# Patient Record
Sex: MALE | Race: WHITE | NOT HISPANIC OR LATINO
[De-identification: names, ages, dates, MRNs, and addresses within clinical notes are randomized per-mention and may not be internally consistent; named-entity substitution may affect disease eponyms.]

---

## 2017-01-30 ENCOUNTER — APPOINTMENT (OUTPATIENT)
Dept: ELECTROPHYSIOLOGY | Facility: CLINIC | Age: 77
End: 2017-01-30

## 2017-03-03 ENCOUNTER — APPOINTMENT (OUTPATIENT)
Dept: ELECTROPHYSIOLOGY | Facility: CLINIC | Age: 77
End: 2017-03-03

## 2017-04-03 ENCOUNTER — APPOINTMENT (OUTPATIENT)
Dept: ELECTROPHYSIOLOGY | Facility: CLINIC | Age: 77
End: 2017-04-03

## 2017-05-05 ENCOUNTER — APPOINTMENT (OUTPATIENT)
Dept: ELECTROPHYSIOLOGY | Facility: CLINIC | Age: 77
End: 2017-05-05

## 2017-06-05 ENCOUNTER — APPOINTMENT (OUTPATIENT)
Dept: ELECTROPHYSIOLOGY | Facility: CLINIC | Age: 77
End: 2017-06-05

## 2017-07-27 ENCOUNTER — OUTPATIENT (OUTPATIENT)
Dept: OUTPATIENT SERVICES | Facility: HOSPITAL | Age: 77
LOS: 1 days | End: 2017-07-27
Payer: MEDICARE

## 2017-07-27 ENCOUNTER — APPOINTMENT (OUTPATIENT)
Dept: MRI IMAGING | Facility: CLINIC | Age: 77
End: 2017-07-27
Payer: MEDICARE

## 2017-07-27 DIAGNOSIS — Z98.89 OTHER SPECIFIED POSTPROCEDURAL STATES: Chronic | ICD-10-CM

## 2017-07-27 DIAGNOSIS — D49.7 NEOPLASM OF UNSPECIFIED BEHAVIOR OF ENDOCRINE GLANDS AND OTHER PARTS OF NERVOUS SYSTEM: ICD-10-CM

## 2017-07-27 DIAGNOSIS — M47.14 OTHER SPONDYLOSIS WITH MYELOPATHY, THORACIC REGION: ICD-10-CM

## 2017-07-27 DIAGNOSIS — M48.07 SPINAL STENOSIS, LUMBOSACRAL REGION: Chronic | ICD-10-CM

## 2017-07-27 PROCEDURE — 72157 MRI CHEST SPINE W/O & W/DYE: CPT

## 2017-07-27 PROCEDURE — 72157 MRI CHEST SPINE W/O & W/DYE: CPT | Mod: 26

## 2017-07-27 PROCEDURE — 82565 ASSAY OF CREATININE: CPT

## 2017-07-27 PROCEDURE — A9585: CPT

## 2018-03-24 ENCOUNTER — APPOINTMENT (OUTPATIENT)
Dept: MRI IMAGING | Facility: CLINIC | Age: 78
End: 2018-03-24
Payer: MEDICARE

## 2018-03-24 ENCOUNTER — OUTPATIENT (OUTPATIENT)
Dept: OUTPATIENT SERVICES | Facility: HOSPITAL | Age: 78
LOS: 1 days | End: 2018-03-24
Payer: MEDICARE

## 2018-03-24 DIAGNOSIS — M48.07 SPINAL STENOSIS, LUMBOSACRAL REGION: Chronic | ICD-10-CM

## 2018-03-24 DIAGNOSIS — Z98.89 OTHER SPECIFIED POSTPROCEDURAL STATES: Chronic | ICD-10-CM

## 2018-03-24 DIAGNOSIS — M51.24 OTHER INTERVERTEBRAL DISC DISPLACEMENT, THORACIC REGION: ICD-10-CM

## 2018-03-24 PROCEDURE — 72146 MRI CHEST SPINE W/O DYE: CPT | Mod: 26

## 2018-03-24 PROCEDURE — 72146 MRI CHEST SPINE W/O DYE: CPT

## 2020-01-14 ENCOUNTER — INPATIENT (INPATIENT)
Facility: HOSPITAL | Age: 80
LOS: 1 days | Discharge: HOME | DRG: 378 | End: 2020-01-16
Attending: HOSPITALIST
Payer: MEDICARE

## 2020-01-14 VITALS
HEART RATE: 70 BPM | HEIGHT: 72 IN | RESPIRATION RATE: 20 BRPM | TEMPERATURE: 98 F | DIASTOLIC BLOOD PRESSURE: 52 MMHG | WEIGHT: 250 LBS | SYSTOLIC BLOOD PRESSURE: 119 MMHG | OXYGEN SATURATION: 95 %

## 2020-01-14 DIAGNOSIS — M48.07 SPINAL STENOSIS, LUMBOSACRAL REGION: Chronic | ICD-10-CM

## 2020-01-14 DIAGNOSIS — Z98.89 OTHER SPECIFIED POSTPROCEDURAL STATES: Chronic | ICD-10-CM

## 2020-01-14 DIAGNOSIS — E78.00 PURE HYPERCHOLESTEROLEMIA, UNSPECIFIED: ICD-10-CM

## 2020-01-14 DIAGNOSIS — I10 ESSENTIAL (PRIMARY) HYPERTENSION: ICD-10-CM

## 2020-01-14 DIAGNOSIS — D50.0 IRON DEFICIENCY ANEMIA SECONDARY TO BLOOD LOSS (CHRONIC): ICD-10-CM

## 2020-01-14 DIAGNOSIS — N17.9 ACUTE KIDNEY FAILURE, UNSPECIFIED: ICD-10-CM

## 2020-01-14 DIAGNOSIS — G45.9 TRANSIENT CEREBRAL ISCHEMIC ATTACK, UNSPECIFIED: ICD-10-CM

## 2020-01-14 DIAGNOSIS — E11.9 TYPE 2 DIABETES MELLITUS WITHOUT COMPLICATIONS: ICD-10-CM

## 2020-01-14 DIAGNOSIS — K92.2 GASTROINTESTINAL HEMORRHAGE, UNSPECIFIED: ICD-10-CM

## 2020-01-14 LAB
ALBUMIN SERPL ELPH-MCNC: 3.9 G/DL — SIGNIFICANT CHANGE UP (ref 3.3–5.2)
ALP SERPL-CCNC: 41 U/L — SIGNIFICANT CHANGE UP (ref 40–120)
ALT FLD-CCNC: 8 U/L — SIGNIFICANT CHANGE UP
ANION GAP SERPL CALC-SCNC: 12 MMOL/L — SIGNIFICANT CHANGE UP (ref 5–17)
ANISOCYTOSIS BLD QL: SLIGHT — SIGNIFICANT CHANGE UP
APPEARANCE UR: CLEAR — SIGNIFICANT CHANGE UP
APTT BLD: 25.2 SEC — LOW (ref 27.5–36.3)
AST SERPL-CCNC: 12 U/L — SIGNIFICANT CHANGE UP
BACTERIA # UR AUTO: ABNORMAL
BASOPHILS # BLD AUTO: 0.06 K/UL — SIGNIFICANT CHANGE UP (ref 0–0.2)
BASOPHILS NFR BLD AUTO: 0.9 % — SIGNIFICANT CHANGE UP (ref 0–2)
BILIRUB SERPL-MCNC: 0.3 MG/DL — LOW (ref 0.4–2)
BILIRUB UR-MCNC: NEGATIVE — SIGNIFICANT CHANGE UP
BLD GP AB SCN SERPL QL: SIGNIFICANT CHANGE UP
BUN SERPL-MCNC: 42 MG/DL — HIGH (ref 8–20)
CALCIUM SERPL-MCNC: 9 MG/DL — SIGNIFICANT CHANGE UP (ref 8.6–10.2)
CHLORIDE SERPL-SCNC: 102 MMOL/L — SIGNIFICANT CHANGE UP (ref 98–107)
CO2 SERPL-SCNC: 22 MMOL/L — SIGNIFICANT CHANGE UP (ref 22–29)
COLOR SPEC: YELLOW — SIGNIFICANT CHANGE UP
COMMENT - URINE: SIGNIFICANT CHANGE UP
CREAT SERPL-MCNC: 1.33 MG/DL — HIGH (ref 0.5–1.3)
DIFF PNL FLD: ABNORMAL
EOSINOPHIL # BLD AUTO: 0 K/UL — SIGNIFICANT CHANGE UP (ref 0–0.5)
EOSINOPHIL NFR BLD AUTO: 0 % — SIGNIFICANT CHANGE UP (ref 0–6)
EPI CELLS # UR: SIGNIFICANT CHANGE UP
GIANT PLATELETS BLD QL SMEAR: PRESENT — SIGNIFICANT CHANGE UP
GLUCOSE BLDC GLUCOMTR-MCNC: 137 MG/DL — HIGH (ref 70–99)
GLUCOSE SERPL-MCNC: 168 MG/DL — HIGH (ref 70–115)
GLUCOSE UR QL: NEGATIVE MG/DL — SIGNIFICANT CHANGE UP
HCT VFR BLD CALC: 21.1 % — LOW (ref 39–50)
HGB BLD-MCNC: 7.2 G/DL — LOW (ref 13–17)
HYALINE CASTS # UR AUTO: ABNORMAL /LPF
HYPOCHROMIA BLD QL: SLIGHT — SIGNIFICANT CHANGE UP
INR BLD: 1.02 RATIO — SIGNIFICANT CHANGE UP (ref 0.88–1.16)
KETONES UR-MCNC: NEGATIVE — SIGNIFICANT CHANGE UP
LEUKOCYTE ESTERASE UR-ACNC: ABNORMAL
LYMPHOCYTES # BLD AUTO: 1.31 K/UL — SIGNIFICANT CHANGE UP (ref 1–3.3)
LYMPHOCYTES # BLD AUTO: 18.6 % — SIGNIFICANT CHANGE UP (ref 13–44)
MACROCYTES BLD QL: SLIGHT — SIGNIFICANT CHANGE UP
MANUAL SMEAR VERIFICATION: SIGNIFICANT CHANGE UP
MCHC RBC-ENTMCNC: 31.7 PG — SIGNIFICANT CHANGE UP (ref 27–34)
MCHC RBC-ENTMCNC: 34.1 GM/DL — SIGNIFICANT CHANGE UP (ref 32–36)
MCV RBC AUTO: 93 FL — SIGNIFICANT CHANGE UP (ref 80–100)
MICROCYTES BLD QL: SLIGHT — SIGNIFICANT CHANGE UP
MONOCYTES # BLD AUTO: 0.12 K/UL — SIGNIFICANT CHANGE UP (ref 0–0.9)
MONOCYTES NFR BLD AUTO: 1.7 % — LOW (ref 2–14)
NEUTROPHILS # BLD AUTO: 5.41 K/UL — SIGNIFICANT CHANGE UP (ref 1.8–7.4)
NEUTROPHILS NFR BLD AUTO: 77 % — SIGNIFICANT CHANGE UP (ref 43–77)
NITRITE UR-MCNC: NEGATIVE — SIGNIFICANT CHANGE UP
OB PNL STL: POSITIVE
OVALOCYTES BLD QL SMEAR: SLIGHT — SIGNIFICANT CHANGE UP
PH UR: 5 — SIGNIFICANT CHANGE UP (ref 5–8)
PLAT MORPH BLD: ABNORMAL
PLATELET # BLD AUTO: 191 K/UL — SIGNIFICANT CHANGE UP (ref 150–400)
PLATELET COUNT - ESTIMATE: NORMAL — SIGNIFICANT CHANGE UP
POIKILOCYTOSIS BLD QL AUTO: SLIGHT — SIGNIFICANT CHANGE UP
POLYCHROMASIA BLD QL SMEAR: SLIGHT — SIGNIFICANT CHANGE UP
POTASSIUM SERPL-MCNC: 4.3 MMOL/L — SIGNIFICANT CHANGE UP (ref 3.5–5.3)
POTASSIUM SERPL-SCNC: 4.3 MMOL/L — SIGNIFICANT CHANGE UP (ref 3.5–5.3)
PROT SERPL-MCNC: 5.9 G/DL — LOW (ref 6.6–8.7)
PROT UR-MCNC: 100 MG/DL
PROTHROM AB SERPL-ACNC: 11.7 SEC — SIGNIFICANT CHANGE UP (ref 10–12.9)
RBC # BLD: 2.27 M/UL — LOW (ref 4.2–5.8)
RBC # FLD: 13.2 % — SIGNIFICANT CHANGE UP (ref 10.3–14.5)
RBC BLD AUTO: ABNORMAL
RBC CASTS # UR COMP ASSIST: ABNORMAL /HPF (ref 0–4)
SODIUM SERPL-SCNC: 136 MMOL/L — SIGNIFICANT CHANGE UP (ref 135–145)
SP GR SPEC: 1.02 — SIGNIFICANT CHANGE UP (ref 1.01–1.02)
TROPONIN T SERPL-MCNC: <0.01 NG/ML — SIGNIFICANT CHANGE UP (ref 0–0.06)
UROBILINOGEN FLD QL: NEGATIVE MG/DL — SIGNIFICANT CHANGE UP
VARIANT LYMPHS # BLD: 1.8 % — SIGNIFICANT CHANGE UP (ref 0–6)
WBC # BLD: 7.03 K/UL — SIGNIFICANT CHANGE UP (ref 3.8–10.5)
WBC # FLD AUTO: 7.03 K/UL — SIGNIFICANT CHANGE UP (ref 3.8–10.5)
WBC UR QL: ABNORMAL

## 2020-01-14 PROCEDURE — 99223 1ST HOSP IP/OBS HIGH 75: CPT

## 2020-01-14 PROCEDURE — 70450 CT HEAD/BRAIN W/O DYE: CPT | Mod: 26

## 2020-01-14 PROCEDURE — 99222 1ST HOSP IP/OBS MODERATE 55: CPT

## 2020-01-14 PROCEDURE — 93010 ELECTROCARDIOGRAM REPORT: CPT

## 2020-01-14 PROCEDURE — 99285 EMERGENCY DEPT VISIT HI MDM: CPT

## 2020-01-14 RX ORDER — CARVEDILOL PHOSPHATE 80 MG/1
3.12 CAPSULE, EXTENDED RELEASE ORAL EVERY 12 HOURS
Refills: 0 | Status: DISCONTINUED | OUTPATIENT
Start: 2020-01-14 | End: 2020-01-16

## 2020-01-14 RX ORDER — AMLODIPINE BESYLATE 2.5 MG/1
5 TABLET ORAL DAILY
Refills: 0 | Status: DISCONTINUED | OUTPATIENT
Start: 2020-01-14 | End: 2020-01-16

## 2020-01-14 RX ORDER — GLUCAGON INJECTION, SOLUTION 0.5 MG/.1ML
1 INJECTION, SOLUTION SUBCUTANEOUS ONCE
Refills: 0 | Status: DISCONTINUED | OUTPATIENT
Start: 2020-01-14 | End: 2020-01-16

## 2020-01-14 RX ORDER — BACLOFEN 100 %
10 POWDER (GRAM) MISCELLANEOUS THREE TIMES A DAY
Refills: 0 | Status: DISCONTINUED | OUTPATIENT
Start: 2020-01-14 | End: 2020-01-16

## 2020-01-14 RX ORDER — METOCLOPRAMIDE HCL 10 MG
10 TABLET ORAL ONCE
Refills: 0 | Status: COMPLETED | OUTPATIENT
Start: 2020-01-14 | End: 2020-01-14

## 2020-01-14 RX ORDER — PANTOPRAZOLE SODIUM 20 MG/1
80 TABLET, DELAYED RELEASE ORAL ONCE
Refills: 0 | Status: COMPLETED | OUTPATIENT
Start: 2020-01-14 | End: 2020-01-14

## 2020-01-14 RX ORDER — INSULIN LISPRO 100/ML
VIAL (ML) SUBCUTANEOUS
Refills: 0 | Status: DISCONTINUED | OUTPATIENT
Start: 2020-01-14 | End: 2020-01-16

## 2020-01-14 RX ORDER — TAMSULOSIN HYDROCHLORIDE 0.4 MG/1
0.8 CAPSULE ORAL AT BEDTIME
Refills: 0 | Status: DISCONTINUED | OUTPATIENT
Start: 2020-01-14 | End: 2020-01-16

## 2020-01-14 RX ORDER — DEXTROSE 50 % IN WATER 50 %
12.5 SYRINGE (ML) INTRAVENOUS ONCE
Refills: 0 | Status: DISCONTINUED | OUTPATIENT
Start: 2020-01-14 | End: 2020-01-16

## 2020-01-14 RX ORDER — ONDANSETRON 8 MG/1
4 TABLET, FILM COATED ORAL EVERY 6 HOURS
Refills: 0 | Status: DISCONTINUED | OUTPATIENT
Start: 2020-01-14 | End: 2020-01-16

## 2020-01-14 RX ORDER — MECLIZINE HCL 12.5 MG
12.5 TABLET ORAL THREE TIMES A DAY
Refills: 0 | Status: DISCONTINUED | OUTPATIENT
Start: 2020-01-14 | End: 2020-01-16

## 2020-01-14 RX ORDER — DEXTROSE 50 % IN WATER 50 %
25 SYRINGE (ML) INTRAVENOUS ONCE
Refills: 0 | Status: DISCONTINUED | OUTPATIENT
Start: 2020-01-14 | End: 2020-01-16

## 2020-01-14 RX ORDER — SODIUM CHLORIDE 9 MG/ML
1000 INJECTION INTRAMUSCULAR; INTRAVENOUS; SUBCUTANEOUS ONCE
Refills: 0 | Status: COMPLETED | OUTPATIENT
Start: 2020-01-14 | End: 2020-01-14

## 2020-01-14 RX ORDER — MECLIZINE HCL 12.5 MG
25 TABLET ORAL ONCE
Refills: 0 | Status: COMPLETED | OUTPATIENT
Start: 2020-01-14 | End: 2020-01-14

## 2020-01-14 RX ORDER — SODIUM CHLORIDE 9 MG/ML
1000 INJECTION, SOLUTION INTRAVENOUS
Refills: 0 | Status: DISCONTINUED | OUTPATIENT
Start: 2020-01-14 | End: 2020-01-16

## 2020-01-14 RX ORDER — SODIUM CHLORIDE 9 MG/ML
1000 INJECTION INTRAMUSCULAR; INTRAVENOUS; SUBCUTANEOUS
Refills: 0 | Status: DISCONTINUED | OUTPATIENT
Start: 2020-01-14 | End: 2020-01-16

## 2020-01-14 RX ORDER — ACETAMINOPHEN 500 MG
650 TABLET ORAL EVERY 6 HOURS
Refills: 0 | Status: DISCONTINUED | OUTPATIENT
Start: 2020-01-14 | End: 2020-01-16

## 2020-01-14 RX ORDER — DEXTROSE 50 % IN WATER 50 %
15 SYRINGE (ML) INTRAVENOUS ONCE
Refills: 0 | Status: DISCONTINUED | OUTPATIENT
Start: 2020-01-14 | End: 2020-01-16

## 2020-01-14 RX ORDER — FINASTERIDE 5 MG/1
5 TABLET, FILM COATED ORAL DAILY
Refills: 0 | Status: DISCONTINUED | OUTPATIENT
Start: 2020-01-14 | End: 2020-01-16

## 2020-01-14 RX ADMIN — FINASTERIDE 5 MILLIGRAM(S): 5 TABLET, FILM COATED ORAL at 23:07

## 2020-01-14 RX ADMIN — CARVEDILOL PHOSPHATE 3.12 MILLIGRAM(S): 80 CAPSULE, EXTENDED RELEASE ORAL at 23:07

## 2020-01-14 RX ADMIN — Medication 10 MILLIGRAM(S): at 11:37

## 2020-01-14 RX ADMIN — Medication 25 MILLIGRAM(S): at 11:36

## 2020-01-14 RX ADMIN — PANTOPRAZOLE SODIUM 80 MILLIGRAM(S): 20 TABLET, DELAYED RELEASE ORAL at 13:52

## 2020-01-14 RX ADMIN — TAMSULOSIN HYDROCHLORIDE 0.8 MILLIGRAM(S): 0.4 CAPSULE ORAL at 23:07

## 2020-01-14 RX ADMIN — AMLODIPINE BESYLATE 5 MILLIGRAM(S): 2.5 TABLET ORAL at 23:07

## 2020-01-14 RX ADMIN — SODIUM CHLORIDE 1000 MILLILITER(S): 9 INJECTION INTRAMUSCULAR; INTRAVENOUS; SUBCUTANEOUS at 11:36

## 2020-01-14 NOTE — ED ADULT NURSE NOTE - PMH
Diabetes    Guillain Barré syndrome  diagnosed oct 2015 treated at [facility]; under care of neurologist.  High cholesterol    Hypertension

## 2020-01-14 NOTE — H&P ADULT - PROBLEM SELECTOR PLAN 6
Per wife recent blood work was normal. ALEIDA likely secondary to poor oral intake, NSAID use, ? ATN. Hold nephrotoxic agents, gentle IVF.

## 2020-01-14 NOTE — ED PROVIDER NOTE - PMH
Diabetes    Guillain Barré syndrome  diagnosed oct 2015 treated at [facility]; under care of neurologist.  High cholesterol    Hypertension

## 2020-01-14 NOTE — ED PROVIDER NOTE - PHYSICAL EXAMINATION
Const: Awake, alert and oriented. In no acute distress. Well appearing.  HEENT: NC/AT. Moist mucous membranes.  Eyes: No scleral icterus. EOMI.  Neck:. Soft and supple. Full ROM without pain.  Cardiac: Regular rate and regular rhythm. +S1/S2. No murmurs. Peripheral pulses 2+ and symmetric. No LE edema.  Resp: Speaking in full sentences. No evidence of respiratory distress. No wheezes, rales or rhonchi.  Abd: Soft, non-tender, non-distended. Normal bowel sounds in all 4 quadrants. No guarding or rebound.  Rectal:  Normal external rectum without lesions or hemorrhoids. Normal tone. Normal brown stool in rectal vault without blood. No internal hemorrhoids appreciated. Normal rectal tone.   Back: Spine midline and non-tender. No CVAT.  Skin: No rashes, abrasions or lacerations.  Neuro: Awake, alert & oriented x 3. Moves all extremities symmetrically. No ataxia. Normal coordination.

## 2020-01-14 NOTE — ED PROVIDER NOTE - CARE PLAN
Principal Discharge DX:	Iron deficiency anemia due to chronic blood loss  Secondary Diagnosis:	Gastrointestinal hemorrhage, unspecified gastrointestinal hemorrhage type

## 2020-01-14 NOTE — ED STATDOCS - PSH
H/O bilateral inguinal hernia repair    History of cholecystectomy    Lumbosacral spinal stenosis  h.o cyst resection, decompression 12/2015

## 2020-01-14 NOTE — ED ADULT TRIAGE NOTE - CHIEF COMPLAINT QUOTE
Patient arrived to ED today with c/o dizziness, weakness, diarrhea.  Patient states he has a history of guillain barre.

## 2020-01-14 NOTE — H&P ADULT - ASSESSMENT
72 yr old male with hypertension, hyperlipidemia, diabetes mellitus, h/o GBS in 2015, TIA presents with complaints of dizziness, nausea, vomiting and diarrhea started 4 days ago, and black stools for 2 days. Recently completed a course of antibiotics for a UTI. CT head done on admission negative for a stroke. Labs reveal Hb 7.2 and creatinine 1.33

## 2020-01-14 NOTE — H&P ADULT - NEUROLOGICAL DETAILS
alert and oriented x 3/normal strength/sensation intact/responds to pain/responds to verbal commands

## 2020-01-14 NOTE — ED PROVIDER NOTE - CLINICAL SUMMARY MEDICAL DECISION MAKING FREE TEXT BOX
78 y/o M with PMH GBM, DM, HTN, HLD presents complaining of a week of worsening dysequilibrium and lightheadedness upon standing that started last week as well as two episodes of vomiting and multiple episodes of dark diarrhea. Denies fevers or abdominal pain. Not on blood thinners. Rectal exam reveals normal colored stool without blood. Will evaluate with CT head and consider MRI for source of dizziness, although symptoms resolve upon lying down which is more consistent with peripheral symptoms. If patient is anemic will transfuse and consult GI. 80 y/o M with PMH GBM, DM, HTN, HLD presents complaining of a week of worsening dysequilibrium and lightheadedness upon standing that started last week as well as two episodes of vomiting and multiple episodes of dark diarrhea. Denies fevers or abdominal pain. Not on blood thinners. Rectal exam reveals normal colored stool without blood. Will evaluate with CT head and consider MRI for source of dizziness, although symptoms resolve upon lying down which is more consistent with peripheral symptoms. If patient is anemic will transfuse and consult GI.  Hgb 7.2 with positive guaiac. Protonix bolus and GI consulted.

## 2020-01-14 NOTE — CONSULT NOTE ADULT - SUBJECTIVE AND OBJECTIVE BOX
HPI: 79 year old man with PMH of DM, GBS, HTN, HTN, TIA on aspirin, loop recorder presented with dizziness and weakness for last 4 days. She had episodes of nausea, vomiting and black colored stools. He attributed it to onion soup. However, he continued to become weakness and difficulty ambulating. Went to PCP and then referred to ED. Noted to be anemic. Last BM last night. No recent EGD. Colonoscopy few years ago. No abdominal pain.       PAST MEDICAL & SURGICAL HISTORY:  High cholesterol  Diabetes  Guillain Barré syndrome: diagnosed oct 2015 treated at [facility]; under care of neurologist.  Hypertension  TIA  Lumbosacral spinal stenosis: h.o cyst resection, decompression 12/2015  History of cholecystectomy  H/O bilateral inguinal hernia repair      ROS:  No Heartburn, regurgitation, dysphagia, odynophagia.  No dyspepsia  No abdominal pain.    + Nausea, vomiting.  + Bleeding.  No hematemesis.   No diarrhea.    No hematochezia  No weight loss, anorexia.  No edema.      MEDICATIONS  (STANDING):    MEDICATIONS  (PRN):      Allergies    No Known Allergies    Intolerances        SOCIAL HISTORY:Denies smoking or drug use. Alcohol use  occasionally    ENDOSCOPIC/GI HISTORY: No recent EGD or colonoscopy.    FAMILY HISTORY:  Family history of cancer      Vital Signs Last 24 Hrs  T(C): 36.8 (14 Jan 2020 10:06), Max: 36.8 (14 Jan 2020 10:06)  T(F): 98.3 (14 Jan 2020 10:06), Max: 98.3 (14 Jan 2020 10:06)  HR: 70 (14 Jan 2020 10:06) (70 - 70)  BP: 119/52 (14 Jan 2020 10:06) (119/52 - 119/52)  BP(mean): --  RR: 20 (14 Jan 2020 10:06) (20 - 20)  SpO2: 95% (14 Jan 2020 10:06) (95% - 95%)    PHYSICAL EXAM:    GENERAL: NAD, well-groomed, well-developed  HEAD:  Atraumatic, Normocephalic  EYES: EOMI, PERRLA, conjunctiva and sclera clear  ENMT: No tonsillar erythema, exudates, or enlargement; Moist mucous membranes, Good dentition, No lesions  NECK: Supple, No JVD, Normal thyroid  CHEST/LUNG: Clear to percussion bilaterally; No rales, rhonchi, wheezing, or rubs  HEART: Regular rate and rhythm; No murmurs, rubs, or gallops  ABDOMEN: Soft, Nontender, Nondistended; Bowel sounds present  EXTREMITIES:  2+ Peripheral Pulses, No clubbing, cyanosis, or edema  LYMPH: No lymphadenopathy noted  SKIN: No rashes or lesions      LABS:                        7.2    7.03  )-----------( 191      ( 14 Jan 2020 12:01 )             21.1     01-14    136  |  102  |  42.0<H>  ----------------------------<  168<H>  4.3   |  22.0  |  1.33<H>    Ca    9.0      14 Jan 2020 12:01    TPro  5.9<L>  /  Alb  3.9  /  TBili  0.3<L>  /  DBili  x   /  AST  12  /  ALT  8   /  AlkPhos  41  01-14    PT/INR - ( 14 Jan 2020 12:01 )   PT: 11.7 sec;   INR: 1.02 ratio         PTT - ( 14 Jan 2020 12:01 )  PTT:25.2 sec       LIVER FUNCTIONS - ( 14 Jan 2020 12:01 )  Alb: 3.9 g/dL / Pro: 5.9 g/dL / ALK PHOS: 41 U/L / ALT: 8 U/L / AST: 12 U/L / GGT: x               RADIOLOGY & ADDITIONAL STUDIES: HPI: 79 year old man with PMH of DM, GBS, HTN, HTN, TIA on aspirin, loop recorder presented with dizziness and weakness for last 4 days. She had episodes of nausea, vomiting and black colored stools. He attributed it to onion soup. However, he continued to become weakness and difficulty ambulating. Went to PCP and then referred to ED. Noted to be anemic. Last BM last night. No recent EGD. Colonoscopy few years ago. No abdominal pain.       PAST MEDICAL & SURGICAL HISTORY:  High cholesterol  Diabetes  Guillain Barré syndrome: diagnosed oct 2015 treated at [facility]; under care of neurologist.  Hypertension  TIA  Lumbosacral spinal stenosis: h.o cyst resection, decompression 12/2015  History of cholecystectomy  H/O bilateral inguinal hernia repair      ROS:  No Heartburn, regurgitation, dysphagia, odynophagia.  No dyspepsia  No abdominal pain.    + Nausea, vomiting.  + Bleeding.  No hematemesis.   No diarrhea.    No hematochezia  No weight loss, anorexia.  No edema.      MEDICATIONS  (STANDING):    MEDICATIONS  (PRN):      Allergies    No Known Allergies    Intolerances        SOCIAL HISTORY:Denies smoking or drug use. Alcohol use  occasionally    ENDOSCOPIC/GI HISTORY: No recent EGD or colonoscopy.    FAMILY HISTORY:  Family history of cancer      Vital Signs Last 24 Hrs  T(C): 36.8 (14 Jan 2020 10:06), Max: 36.8 (14 Jan 2020 10:06)  T(F): 98.3 (14 Jan 2020 10:06), Max: 98.3 (14 Jan 2020 10:06)  HR: 70 (14 Jan 2020 10:06) (70 - 70)  BP: 119/52 (14 Jan 2020 10:06) (119/52 - 119/52)  BP(mean): --  RR: 20 (14 Jan 2020 10:06) (20 - 20)  SpO2: 95% (14 Jan 2020 10:06) (95% - 95%)    PHYSICAL EXAM:    GENERAL: NAD, well-groomed, well-developed  HEAD:  Atraumatic, Normocephalic  EYES: EOMI, PERRLA, conjunctiva and sclera clear  ENMT: No tonsillar erythema, exudates, or enlargement; Moist mucous membranes, Good dentition, No lesions  NECK: Supple, No JVD, Normal thyroid  CHEST/LUNG: Clear to percussion bilaterally; No rales, rhonchi, wheezing, or rubs  HEART: Regular rate and rhythm; No murmurs, rubs, or gallops  ABDOMEN: Soft, Nontender, Nondistended; Bowel sounds present  RECTAL: Refused. But performed by ER physician -brown stools  EXTREMITIES:  2+ Peripheral Pulses, No clubbing, cyanosis, or edema  LYMPH: No lymphadenopathy noted  SKIN: No rashes or lesions      LABS:                        7.2    7.03  )-----------( 191      ( 14 Jan 2020 12:01 )             21.1     01-14    136  |  102  |  42.0<H>  ----------------------------<  168<H>  4.3   |  22.0  |  1.33<H>    Ca    9.0      14 Jan 2020 12:01    TPro  5.9<L>  /  Alb  3.9  /  TBili  0.3<L>  /  DBili  x   /  AST  12  /  ALT  8   /  AlkPhos  41  01-14    PT/INR - ( 14 Jan 2020 12:01 )   PT: 11.7 sec;   INR: 1.02 ratio         PTT - ( 14 Jan 2020 12:01 )  PTT:25.2 sec       LIVER FUNCTIONS - ( 14 Jan 2020 12:01 )  Alb: 3.9 g/dL / Pro: 5.9 g/dL / ALK PHOS: 41 U/L / ALT: 8 U/L / AST: 12 U/L / GGT: x               RADIOLOGY & ADDITIONAL STUDIES: HPI: 79 year old man with PMH of DM, GBS, HTN, HTN, TIA on aspirin, loop recorder presented with dizziness and weakness for last 4 days. She had episodes of nausea, vomiting and black colored stools. He attributed it to onion soup. However, he continued to become weakness and difficulty ambulating. Went to PCP and then referred to ED. Noted to be anemic. Last BM last night. No recent EGD. Colonoscopy few years ago. No abdominal pain.     Patient has been using NSAID-motrin use for headache      PAST MEDICAL & SURGICAL HISTORY:  High cholesterol  Diabetes  Guillain Barré syndrome: diagnosed oct 2015 treated at [facility]; under care of neurologist.  Hypertension  TIA  Lumbosacral spinal stenosis: h.o cyst resection, decompression 12/2015  History of cholecystectomy  H/O bilateral inguinal hernia repair      ROS:  No Heartburn, regurgitation, dysphagia, odynophagia.  No dyspepsia  No abdominal pain.    + Nausea, vomiting.  + Bleeding.  No hematemesis.   No diarrhea.    No hematochezia  No weight loss, anorexia.  No edema.      MEDICATIONS  (STANDING):    MEDICATIONS  (PRN):      Allergies    No Known Allergies    Intolerances        SOCIAL HISTORY:Denies smoking or drug use. Alcohol use  occasionally    ENDOSCOPIC/GI HISTORY: No recent EGD or colonoscopy.    FAMILY HISTORY:  Family history of cancer      Vital Signs Last 24 Hrs  T(C): 36.8 (14 Jan 2020 10:06), Max: 36.8 (14 Jan 2020 10:06)  T(F): 98.3 (14 Jan 2020 10:06), Max: 98.3 (14 Jan 2020 10:06)  HR: 70 (14 Jan 2020 10:06) (70 - 70)  BP: 119/52 (14 Jan 2020 10:06) (119/52 - 119/52)  BP(mean): --  RR: 20 (14 Jan 2020 10:06) (20 - 20)  SpO2: 95% (14 Jan 2020 10:06) (95% - 95%)    PHYSICAL EXAM:    GENERAL: NAD, well-groomed, well-developed  HEAD:  Atraumatic, Normocephalic  EYES: EOMI, PERRLA, conjunctiva and sclera clear  ENMT: No tonsillar erythema, exudates, or enlargement; Moist mucous membranes, Good dentition, No lesions  NECK: Supple, No JVD, Normal thyroid  CHEST/LUNG: Clear to percussion bilaterally; No rales, rhonchi, wheezing, or rubs  HEART: Regular rate and rhythm; No murmurs, rubs, or gallops  ABDOMEN: Soft, Nontender, Nondistended; Bowel sounds present  RECTAL: Refused. But performed by ER physician -brown stools  EXTREMITIES:  2+ Peripheral Pulses, No clubbing, cyanosis, or edema  LYMPH: No lymphadenopathy noted  SKIN: No rashes or lesions      LABS:                        7.2    7.03  )-----------( 191      ( 14 Jan 2020 12:01 )             21.1     01-14    136  |  102  |  42.0<H>  ----------------------------<  168<H>  4.3   |  22.0  |  1.33<H>    Ca    9.0      14 Jan 2020 12:01    TPro  5.9<L>  /  Alb  3.9  /  TBili  0.3<L>  /  DBili  x   /  AST  12  /  ALT  8   /  AlkPhos  41  01-14    PT/INR - ( 14 Jan 2020 12:01 )   PT: 11.7 sec;   INR: 1.02 ratio         PTT - ( 14 Jan 2020 12:01 )  PTT:25.2 sec       LIVER FUNCTIONS - ( 14 Jan 2020 12:01 )  Alb: 3.9 g/dL / Pro: 5.9 g/dL / ALK PHOS: 41 U/L / ALT: 8 U/L / AST: 12 U/L / GGT: x               RADIOLOGY & ADDITIONAL STUDIES:

## 2020-01-14 NOTE — ED STATDOCS - PMH
Diabetes    Guillain Barré syndrome  diagnosed oct 2015 treated at [facility]; under care of neurologist.  High cholesterol    Hypertension

## 2020-01-14 NOTE — H&P ADULT - HISTORY OF PRESENT ILLNESS
72 yr old male with hypertension, hyperlipidemia, diabetes mellitus, h/o GBS in 2015, TIA presents with complaints of dizziness, nausea, vomiting and diarrhea started 4 days ago. States he was diagnosed with a UTI a few days ago, and was given a 10 day course of antibiotics which he completed yesterday. States he started to feel dizzy, unable to eat anything secondary to nausea and vomiting. He noted to have black stools for 2 days and given increasing shortness of breath on exertion, he came in for evaluation today. He reports taking Ibuprofen 2-3 times a day, every other day for headaches. He reports using a cane for ambulation and walker when he is outside the house. No chest pain, palpitations. Increased urinary frequency, had burning, which resolved. Reports difficulty walking from weakness. No incontinence.

## 2020-01-14 NOTE — ED PROVIDER NOTE - NS ED ROS FT
Const: Denies fever, chills  HEENT: Denies blurry vision, sore throat  Neck: Denies neck pain/stiffness  Resp: Denies coughing, SOB  Cardiovascular: Denies CP, palpitations, LE edema  GI: + nausea, + vomiting, + diarrhea, + blood in stool. Denies abdominal pain, constipation  : Denies urinary frequency/urgency/dysuria, hematuria  MSK: Denies back pain  Neuro: + dizziness. Denies HA, numbness, weakness  Skin: Denies rashes.

## 2020-01-14 NOTE — ED STATDOCS - PROGRESS NOTE DETAILS
80y/o M with PMHx of RUBIO Morelos presents to the ED c/o room spinning dizziness which began 5 days ago. Assoc. sx of vomiting the first two days and melena with current nausea. Pt went to PCP Lino and was referred to f/u at ED due to concern for anemia.   Pt sent to main ER for complete evaul and workup.

## 2020-01-14 NOTE — H&P ADULT - ATTENDING COMMENTS
Discussed with patient plan of care. Discussed with patient plan of care.  Updated wife, obtained medication list from wife over the phone. She is to bring in the list to the hospital. Uses mail order pharmacy.

## 2020-01-14 NOTE — H&P ADULT - NSICDXPASTMEDICALHX_GEN_ALL_CORE_FT
PAST MEDICAL HISTORY:  Diabetes     Guillain Barré syndrome diagnosed oct 2015 treated at [facility]; under care of neurologist.    High cholesterol     Hypertension

## 2020-01-14 NOTE — H&P ADULT - PROBLEM SELECTOR PLAN 1
Evaluated by GI in ED, plan for EGD in am. Likely upper GI given recent NSAID use. Continue PPI. Transfuse 2 units PRBC. Monitor Hb, hemodynamically stable.

## 2020-01-14 NOTE — H&P ADULT - NSICDXPASTSURGICALHX_GEN_ALL_CORE_FT
PAST SURGICAL HISTORY:  H/O bilateral inguinal hernia repair     History of cholecystectomy     Lumbosacral spinal stenosis h.o cyst resection, decompression 12/2015

## 2020-01-14 NOTE — ED PROVIDER NOTE - OBJECTIVE STATEMENT
80 y/o M with PMH DM, GBM in 10/2015, HLD, HTN presents complaining of worsening dizziness that he describes as lightheadedness when he stands up and extreme unbalance despite walking with his walker or cane that resolves when he lays down and rests, as well as multiple episodes of black stool over the past 4 days. He states he has always had loose stools, but it is usually a brownish color. His wife states that he vomited 3 days ago and 2 days ago, but none since then. He also has had no appetite and hasn't eaten anything in the past 2 days. He denies fevers, chills, chest pain. He notes some SOB with exertion, but states he doesn't really exert himself too much due to his baseline weakness. He has not fallen and takes no blood thinners.

## 2020-01-15 ENCOUNTER — RESULT REVIEW (OUTPATIENT)
Age: 80
End: 2020-01-15

## 2020-01-15 LAB
ANION GAP SERPL CALC-SCNC: 10 MMOL/L — SIGNIFICANT CHANGE UP (ref 5–17)
BASOPHILS # BLD AUTO: 0.02 K/UL — SIGNIFICANT CHANGE UP (ref 0–0.2)
BASOPHILS NFR BLD AUTO: 0.3 % — SIGNIFICANT CHANGE UP (ref 0–2)
BUN SERPL-MCNC: 37 MG/DL — HIGH (ref 8–20)
CALCIUM SERPL-MCNC: 8.5 MG/DL — LOW (ref 8.6–10.2)
CHLORIDE SERPL-SCNC: 105 MMOL/L — SIGNIFICANT CHANGE UP (ref 98–107)
CO2 SERPL-SCNC: 23 MMOL/L — SIGNIFICANT CHANGE UP (ref 22–29)
CREAT SERPL-MCNC: 1.1 MG/DL — SIGNIFICANT CHANGE UP (ref 0.5–1.3)
CULTURE RESULTS: SIGNIFICANT CHANGE UP
EOSINOPHIL # BLD AUTO: 0.13 K/UL — SIGNIFICANT CHANGE UP (ref 0–0.5)
EOSINOPHIL NFR BLD AUTO: 2 % — SIGNIFICANT CHANGE UP (ref 0–6)
GLUCOSE BLDC GLUCOMTR-MCNC: 134 MG/DL — HIGH (ref 70–99)
GLUCOSE BLDC GLUCOMTR-MCNC: 135 MG/DL — HIGH (ref 70–99)
GLUCOSE BLDC GLUCOMTR-MCNC: 152 MG/DL — HIGH (ref 70–99)
GLUCOSE BLDC GLUCOMTR-MCNC: 154 MG/DL — HIGH (ref 70–99)
GLUCOSE SERPL-MCNC: 143 MG/DL — HIGH (ref 70–115)
HBA1C BLD-MCNC: 5.7 % — HIGH (ref 4–5.6)
HCT VFR BLD CALC: 23.3 % — LOW (ref 39–50)
HGB BLD-MCNC: 7.6 G/DL — LOW (ref 13–17)
IMM GRANULOCYTES NFR BLD AUTO: 0.2 % — SIGNIFICANT CHANGE UP (ref 0–1.5)
INR BLD: 1.05 RATIO — SIGNIFICANT CHANGE UP (ref 0.88–1.16)
LYMPHOCYTES # BLD AUTO: 1.82 K/UL — SIGNIFICANT CHANGE UP (ref 1–3.3)
LYMPHOCYTES # BLD AUTO: 27.7 % — SIGNIFICANT CHANGE UP (ref 13–44)
MAGNESIUM SERPL-MCNC: 1.6 MG/DL — SIGNIFICANT CHANGE UP (ref 1.6–2.6)
MCHC RBC-ENTMCNC: 30 PG — SIGNIFICANT CHANGE UP (ref 27–34)
MCHC RBC-ENTMCNC: 32.6 GM/DL — SIGNIFICANT CHANGE UP (ref 32–36)
MCV RBC AUTO: 92.1 FL — SIGNIFICANT CHANGE UP (ref 80–100)
MONOCYTES # BLD AUTO: 0.61 K/UL — SIGNIFICANT CHANGE UP (ref 0–0.9)
MONOCYTES NFR BLD AUTO: 9.3 % — SIGNIFICANT CHANGE UP (ref 2–14)
NEUTROPHILS # BLD AUTO: 3.99 K/UL — SIGNIFICANT CHANGE UP (ref 1.8–7.4)
NEUTROPHILS NFR BLD AUTO: 60.5 % — SIGNIFICANT CHANGE UP (ref 43–77)
PHOSPHATE SERPL-MCNC: 3.2 MG/DL — SIGNIFICANT CHANGE UP (ref 2.4–4.7)
PLATELET # BLD AUTO: 153 K/UL — SIGNIFICANT CHANGE UP (ref 150–400)
POTASSIUM SERPL-MCNC: 4.2 MMOL/L — SIGNIFICANT CHANGE UP (ref 3.5–5.3)
POTASSIUM SERPL-SCNC: 4.2 MMOL/L — SIGNIFICANT CHANGE UP (ref 3.5–5.3)
PROTHROM AB SERPL-ACNC: 12.1 SEC — SIGNIFICANT CHANGE UP (ref 10–12.9)
RBC # BLD: 2.53 M/UL — LOW (ref 4.2–5.8)
RBC # FLD: 13.9 % — SIGNIFICANT CHANGE UP (ref 10.3–14.5)
SODIUM SERPL-SCNC: 138 MMOL/L — SIGNIFICANT CHANGE UP (ref 135–145)
SPECIMEN SOURCE: SIGNIFICANT CHANGE UP
WBC # BLD: 6.58 K/UL — SIGNIFICANT CHANGE UP (ref 3.8–10.5)
WBC # FLD AUTO: 6.58 K/UL — SIGNIFICANT CHANGE UP (ref 3.8–10.5)

## 2020-01-15 PROCEDURE — 88342 IMHCHEM/IMCYTCHM 1ST ANTB: CPT | Mod: 26

## 2020-01-15 PROCEDURE — 99231 SBSQ HOSP IP/OBS SF/LOW 25: CPT

## 2020-01-15 PROCEDURE — 88305 TISSUE EXAM BY PATHOLOGIST: CPT | Mod: 26

## 2020-01-15 PROCEDURE — 43239 EGD BIOPSY SINGLE/MULTIPLE: CPT

## 2020-01-15 RX ORDER — PANTOPRAZOLE SODIUM 20 MG/1
40 TABLET, DELAYED RELEASE ORAL EVERY 12 HOURS
Refills: 0 | Status: DISCONTINUED | OUTPATIENT
Start: 2020-01-15 | End: 2020-01-16

## 2020-01-15 RX ORDER — SUCRALFATE 1 G
1 TABLET ORAL
Refills: 0 | Status: DISCONTINUED | OUTPATIENT
Start: 2020-01-15 | End: 2020-01-16

## 2020-01-15 RX ORDER — PANTOPRAZOLE SODIUM 20 MG/1
8 TABLET, DELAYED RELEASE ORAL
Qty: 80 | Refills: 0 | Status: DISCONTINUED | OUTPATIENT
Start: 2020-01-15 | End: 2020-01-15

## 2020-01-15 RX ORDER — MAGNESIUM SULFATE 500 MG/ML
2 VIAL (ML) INJECTION ONCE
Refills: 0 | Status: COMPLETED | OUTPATIENT
Start: 2020-01-15 | End: 2020-01-15

## 2020-01-15 RX ORDER — INFLUENZA VIRUS VACCINE 15; 15; 15; 15 UG/.5ML; UG/.5ML; UG/.5ML; UG/.5ML
0.5 SUSPENSION INTRAMUSCULAR ONCE
Refills: 0 | Status: DISCONTINUED | OUTPATIENT
Start: 2020-01-15 | End: 2020-01-16

## 2020-01-15 RX ADMIN — SODIUM CHLORIDE 70 MILLILITER(S): 9 INJECTION INTRAMUSCULAR; INTRAVENOUS; SUBCUTANEOUS at 02:14

## 2020-01-15 RX ADMIN — CARVEDILOL PHOSPHATE 3.12 MILLIGRAM(S): 80 CAPSULE, EXTENDED RELEASE ORAL at 18:30

## 2020-01-15 RX ADMIN — FINASTERIDE 5 MILLIGRAM(S): 5 TABLET, FILM COATED ORAL at 18:30

## 2020-01-15 RX ADMIN — CARVEDILOL PHOSPHATE 3.12 MILLIGRAM(S): 80 CAPSULE, EXTENDED RELEASE ORAL at 05:36

## 2020-01-15 RX ADMIN — TAMSULOSIN HYDROCHLORIDE 0.8 MILLIGRAM(S): 0.4 CAPSULE ORAL at 22:35

## 2020-01-15 RX ADMIN — PANTOPRAZOLE SODIUM 10 MG/HR: 20 TABLET, DELAYED RELEASE ORAL at 09:14

## 2020-01-15 RX ADMIN — Medication 50 GRAM(S): at 08:17

## 2020-01-15 RX ADMIN — Medication 1 GRAM(S): at 22:36

## 2020-01-15 RX ADMIN — AMLODIPINE BESYLATE 5 MILLIGRAM(S): 2.5 TABLET ORAL at 05:36

## 2020-01-15 RX ADMIN — Medication 1 GRAM(S): at 23:49

## 2020-01-15 RX ADMIN — PANTOPRAZOLE SODIUM 40 MILLIGRAM(S): 20 TABLET, DELAYED RELEASE ORAL at 18:30

## 2020-01-15 NOTE — CONSULT NOTE ADULT - SUBJECTIVE AND OBJECTIVE BOX
[place] [facility] - [facility], THE HEART CENTER                                   [address] [place] [address]                                                      PHONE: [phone]                                                         FAX: [phone]  [email or web address]  ---------------------------------------------------------------------------------------------------------------------------------    Reason for Consult: PREOP CV EVALUATION    HPI:  KETAN MARKHAM is an 79y Male with under PMHx s/p ILR presenting to [facility] c/o weakness fatigue and black stools found to have severe anemia planning to undergo EGD tomorrow.   No CP or SOB    PAST MEDICAL & SURGICAL HISTORY:  High cholesterol  Diabetes  Guillain Barré syndrome: diagnosed oct 2015 treated at [facility]; under care of neurologist.  Hypertension  Lumbosacral spinal stenosis: h.o cyst resection, decompression 2015  History of cholecystectomy  H/O bilateral inguinal hernia repair      No Known Allergies      MEDICATIONS  (STANDING):  amLODIPine   Tablet 5 milliGRAM(s) Oral daily  carvedilol 3.125 milliGRAM(s) Oral every 12 hours  dextrose 5%. 1000 milliLiter(s) (50 mL/Hr) IV Continuous <Continuous>  dextrose 50% Injectable 12.5 Gram(s) IV Push once  dextrose 50% Injectable 25 Gram(s) IV Push once  dextrose 50% Injectable 25 Gram(s) IV Push once  finasteride 5 milliGRAM(s) Oral daily  influenza   Vaccine 0.5 milliLiter(s) IntraMuscular once  insulin lispro (HumaLOG) corrective regimen sliding scale   SubCutaneous three times a day before meals  pantoprazole Infusion 8 mG/Hr (10 mL/Hr) IV Continuous <Continuous>  sodium chloride 0.9%. 1000 milliLiter(s) (70 mL/Hr) IV Continuous <Continuous>  tamsulosin 0.8 milliGRAM(s) Oral at bedtime    MEDICATIONS  (PRN):  acetaminophen   Tablet .. 650 milliGRAM(s) Oral every 6 hours PRN Temp greater or equal to 38C (100.4F), Mild Pain (1 - 3)  baclofen 10 milliGRAM(s) Oral three times a day PRN Muscle Spasm  dextrose 40% Gel 15 Gram(s) Oral once PRN Blood Glucose LESS THAN 70 milliGRAM(s)/deciliter  glucagon  Injectable 1 milliGRAM(s) IntraMuscular once PRN Glucose LESS THAN 70 milligrams/deciliter  meclizine 12.5 milliGRAM(s) Oral three times a day PRN Dizziness  ondansetron Injectable 4 milliGRAM(s) IV Push every 6 hours PRN Nausea and/or Vomiting      Social History:  Cigarettes:                    Alchohol:                 Illicit Drug Abuse:      REVIEW OF SYSTEMS:    Constitutional: No fever, weight loss or fatigue  Eyes: No eye pain, visual disturbances, or discharge  ENMT:  No difficulty hearing, tinnitus, vertigo; No sinus or throat pain  Neck: No pain or stiffness  Respiratory: No cough, wheezing, chills or hemoptysis  Cardiovascular: No chest pain, palpitations, shortness of breath, dizziness or leg swelling  Gastrointestinal: No abdominal or epigastric pain. No nausea, vomiting or hematemesis; No diarrhea or constipation. No melena or hematochezia.  Genitourinary: No dysuria, frequency, hematuria or incontinence  Rectal: No pain, hemorrhoids or incontinence  Neurological: No headaches, memory loss, loss of strength, numbness or tremors  Skin: No itching, burning, rashes or lesions   Lymph Nodes: No enlarged glands  Endocrine: No heat or cold intolerance; No hair loss  Musculoskeletal: No joint pain or swelling; No muscle, back or extremity pain  Psychiatric: No depression, anxiety, mood swings or difficulty sleeping  Heme/Lymph: No easy bruising or bleeding gums  Allergy and Immunologic: No hives or eczema    Vital Signs Last 24 Hrs  T(C): 36.9 (15 Peterson 2020 05:35), Max: 37.1 (2020 19:05)  T(F): 98.5 (15 Peterson 2020 05:35), Max: 98.7 (2020 19:05)  HR: 67 (15 Peterson 2020 05:35) (64 - 70)  BP: 125/55 (15 Peterson 2020 05:35) (118/60 - 156/70)  BP(mean): --  RR: 16 (15 Peterson 2020 05:35) (16 - 20)  SpO2: 99% (15 Peterson 2020 05:35) (95% - 100%)  ICU Vital Signs Last 24 Hrs  KETAN MARKHAM  I&O's Detail    15 Peterson 2020 07:01  -  15 Peterson 2020 08:40  --------------------------------------------------------  IN:  Total IN: 0 mL    OUT:    Voided: 350 mL  Total OUT: 350 mL    Total NET: -350 mL        I&O's Summary    15 Peterson 2020 07:  -  15 Peterson 2020 08:40  --------------------------------------------------------  IN: 0 mL / OUT: 350 mL / NET: -350 mL      Drug Dosing Weight  KETAN MARKHAM      PHYSICAL EXAM:  General: Appears well developed, well nourished alert and cooperative.  HEENT: Head; normocephalic, atraumatic.  Eyes: Pupils reactive, cornea wnl.  Neck: Supple, no nodes adenopathy, no NVD or carotid bruit or thyromegaly.  CARDIOVASCULAR: Normal S1 and S2, No murmur, rub, gallop or lift.   LUNGS: No rales, rhonchi or wheeze. Normal breath sounds bilaterally.  ABDOMEN: Soft, nontender without mass or organomegaly. bowel sounds normoactive.  EXTREMITIES: No clubbing, cyanosis or edema. Distal pulses wnl.   SKIN: warm and dry with normal turgor.  NEURO: Alert/oriented x 3/normal motor exam. No pathologic reflexes.    PSYCH: normal affect.        LABS:                        7.6    6.58  )-----------( 153      ( 15 Peterson 2020 06:08 )             23.3     01-15    138  |  105  |  37.0<H>  ----------------------------<  143<H>  4.2   |  23.0  |  1.10    Ca    8.5<L>      15 Peterson 2020 06:08  Phos  3.2     01-15  Mg     1.6     -15    TPro  5.9<L>  /  Alb  3.9  /  TBili  0.3<L>  /  DBili  x   /  AST  12  /  ALT  8   /  AlkPhos  41  -14    KETAN MARKHAM  CARDIAC MARKERS ( 2020 12:01 )  x     / <0.01 ng/mL / x     / x     / x          PT/INR - ( 15 Peterson 2020 06:08 )   PT: 12.1 sec;   INR: 1.05 ratio         PTT - ( 2020 12:01 )  PTT:25.2 sec  Urinalysis Basic - ( 2020 14:40 )    Color: Yellow / Appearance: Clear / S.020 / pH: x  Gluc: x / Ketone: Negative  / Bili: Negative / Urobili: Negative mg/dL   Blood: x / Protein: 100 mg/dL / Nitrite: Negative   Leuk Esterase: Moderate / RBC: 3-5 /HPF / WBC 26-50   Sq Epi: x / Non Sq Epi: Occasional / Bacteria: Few        RADIOLOGY & ADDITIONAL STUDIES:    INTERPRETATION OF TELEMETRY (personally reviewed):    ECG: < from: 12 Lead ECG (20 @ 19:23) >  Diagnosis Line Normal sinus rhythm  Right bundle branch block  T wave abnormality, consider inferolateral ischemia    < end of copied text >      ECHO:   < from: TTE Echo Complete w/Doppler (16 @ 14:15) >  IMPRESSION: Summary:   1. Left ventricular ejection fraction, by visual estimation, is 65 to   70%.   2. Normal global left ventricular systolic function.   3. Moderately increased LV wall thickness.   4. There is mild concentric left ventricular hypertrophy.   5. There is no evidence of pericardial effusion.   6. Sclerotic aortic valve with normal opening.   7. Moderately enlarged left atrium.    < end of copied text >      ACTIVE PROBLEMS:  HEALTH ISSUES - PROBLEM Dx:  TIA (transient ischemic attack): TIA (transient ischemic attack)  ALEIDA (acute kidney injury): ALEIDA (acute kidney injury)  Hypertension: Hypertension  Diabetes: Diabetes  High cholesterol: High cholesterol  Gastrointestinal hemorrhage, unspecified gastrointestinal hemorrhage type: Gastrointestinal hemorrhage, unspecified gastrointestinal hemorrhage type          Assessment and Plan:    In summary, KETAN MARKHAM is an 79y Male with past medical history significant for     1) Preoperative Optimization         -No Cardiovascular Contraindication to EGD       -Patient is acceptable risk for a low risk procedure       -Continue cardiac medications as scheduled       -Will follow                        JOAQUÍN DONOVAN MD, FACC, LYDIA [place] [facility] - [facility], [facility] C[facility]                                   [address] [place] [address]                                                      PHONE: [phone]                                                         FAX: [phone]  [email or web address]  ---------------------------------------------------------------------------------------------------------------------------------    Reason for Consult: PREOP CV EVALUATION    HPI:  KETAN MARKHAM is an 79y Male with under PMHx s/p ILR presenting to [facility] c/o weakness fatigue and black stools found to have severe anemia planning to undergo EGD today . Had been taking ASA and ibuprofen 4 tabs EOD  No CP at present    PAST MEDICAL & SURGICAL HISTORY:  High cholesterol  Diabetes  Guillain Barré syndrome: diagnosed oct 2015 treated at [facility]; under care of neurologist.  Hypertension  Lumbosacral spinal stenosis: h.o cyst resection, decompression 2015  History of cholecystectomy  H/O bilateral inguinal hernia repair      No Known Allergies      MEDICATIONS  (STANDING):  amLODIPine   Tablet 5 milliGRAM(s) Oral daily  carvedilol 3.125 milliGRAM(s) Oral every 12 hours  dextrose 5%. 1000 milliLiter(s) (50 mL/Hr) IV Continuous <Continuous>  dextrose 50% Injectable 12.5 Gram(s) IV Push once  dextrose 50% Injectable 25 Gram(s) IV Push once  dextrose 50% Injectable 25 Gram(s) IV Push once  finasteride 5 milliGRAM(s) Oral daily  influenza   Vaccine 0.5 milliLiter(s) IntraMuscular once  insulin lispro (HumaLOG) corrective regimen sliding scale   SubCutaneous three times a day before meals  pantoprazole Infusion 8 mG/Hr (10 mL/Hr) IV Continuous <Continuous>  sodium chloride 0.9%. 1000 milliLiter(s) (70 mL/Hr) IV Continuous <Continuous>  tamsulosin 0.8 milliGRAM(s) Oral at bedtime    MEDICATIONS  (PRN):  acetaminophen   Tablet .. 650 milliGRAM(s) Oral every 6 hours PRN Temp greater or equal to 38C (100.4F), Mild Pain (1 - 3)  baclofen 10 milliGRAM(s) Oral three times a day PRN Muscle Spasm  dextrose 40% Gel 15 Gram(s) Oral once PRN Blood Glucose LESS THAN 70 milliGRAM(s)/deciliter  glucagon  Injectable 1 milliGRAM(s) IntraMuscular once PRN Glucose LESS THAN 70 milligrams/deciliter  meclizine 12.5 milliGRAM(s) Oral three times a day PRN Dizziness  ondansetron Injectable 4 milliGRAM(s) IV Push every 6 hours PRN Nausea and/or Vomiting      Social History:  Cigarettes:                    Alchohol:                 Illicit Drug Abuse:      REVIEW OF SYSTEMS:    Constitutional: No fever, weight loss or fatigue  Eyes: No eye pain, visual disturbances, or discharge  ENMT:  No difficulty hearing, tinnitus, vertigo; No sinus or throat pain  Neck: No pain or stiffness  Respiratory: No cough, wheezing, chills or hemoptysis  Cardiovascular: No chest pain, palpitations, shortness of breath, dizziness or leg swelling  Gastrointestinal: No abdominal or epigastric pain. No nausea, vomiting or hematemesis; No diarrhea or constipation. No melena or hematochezia.  Genitourinary: No dysuria, frequency, hematuria or incontinence  Rectal: No pain, hemorrhoids or incontinence  Neurological: No headaches, memory loss, loss of strength, numbness or tremors  Skin: No itching, burning, rashes or lesions   Lymph Nodes: No enlarged glands  Endocrine: No heat or cold intolerance; No hair loss  Musculoskeletal: No joint pain or swelling; No muscle, back or extremity pain  Psychiatric: No depression, anxiety, mood swings or difficulty sleeping  Heme/Lymph: No easy bruising or bleeding gums  Allergy and Immunologic: No hives or eczema    Vital Signs Last 24 Hrs  T(C): 36.9 (15 Peterson 2020 05:35), Max: 37.1 (2020 19:05)  T(F): 98.5 (15 Peterson 2020 05:35), Max: 98.7 (2020 19:05)  HR: 67 (15 Peterson 2020 05:35) (64 - 70)  BP: 125/55 (15 Peterson 2020 05:35) (118/60 - 156/70)  BP(mean): --  RR: 16 (15 Peterson 2020 05:35) (16 - 20)  SpO2: 99% (15 Peterson 2020 05:35) (95% - 100%)  ICU Vital Signs Last 24 Hrs  KETAN MARKHAM  I&O's Detail    15 Peterson 2020 07:01  -  15 Peterson 2020 08:40  --------------------------------------------------------  IN:  Total IN: 0 mL    OUT:    Voided: 350 mL  Total OUT: 350 mL    Total NET: -350 mL        I&O's Summary    15 Peterson 2020 07:01  -  15 Peterson 2020 08:40  --------------------------------------------------------  IN: 0 mL / OUT: 350 mL / NET: -350 mL      Drug Dosing Weight  KETAN MARKHAM      PHYSICAL EXAM:  General: Appears well developed, well nourished alert and cooperative.  HEENT: Head; normocephalic, atraumatic.  Eyes: Pupils reactive, cornea wnl.  Neck: Supple, no nodes adenopathy, no NVD or carotid bruit or thyromegaly.  CARDIOVASCULAR: Normal S1 and S2, No murmur, rub, gallop or lift.   LUNGS: No rales, rhonchi or wheeze. Normal breath sounds bilaterally.  ABDOMEN: Soft, nontender without mass or organomegaly. bowel sounds normoactive.  EXTREMITIES: No clubbing, cyanosis or edema. Distal pulses wnl.   SKIN: warm and dry with normal turgor.  NEURO: Alert/oriented x 3/normal motor exam. No pathologic reflexes.    PSYCH: normal affect.        LABS:                        7.6    6.58  )-----------( 153      ( 15 Peterson 2020 06:08 )             23.3     01-15    138  |  105  |  37.0<H>  ----------------------------<  143<H>  4.2   |  23.0  |  1.10    Ca    8.5<L>      15 Peterson 2020 06:08  Phos  3.2     01-15  Mg     1.6     -15    TPro  5.9<L>  /  Alb  3.9  /  TBili  0.3<L>  /  DBili  x   /  AST  12  /  ALT  8   /  AlkPhos  41  14    KETAN MARKHAM  CARDIAC MARKERS ( 2020 12:01 )  x     / <0.01 ng/mL / x     / x     / x          PT/INR - ( 15 Peterson 2020 06:08 )   PT: 12.1 sec;   INR: 1.05 ratio         PTT - ( 2020 12:01 )  PTT:25.2 sec  Urinalysis Basic - ( 2020 14:40 )    Color: Yellow / Appearance: Clear / S.020 / pH: x  Gluc: x / Ketone: Negative  / Bili: Negative / Urobili: Negative mg/dL   Blood: x / Protein: 100 mg/dL / Nitrite: Negative   Leuk Esterase: Moderate / RBC: 3-5 /HPF / WBC 26-50   Sq Epi: x / Non Sq Epi: Occasional / Bacteria: Few        RADIOLOGY & ADDITIONAL STUDIES:    INTERPRETATION OF TELEMETRY (personally reviewed):    ECG: < from: 12 Lead ECG (20 @ 19:23) >  Diagnosis Line Normal sinus rhythm  Right bundle branch block  T wave abnormality, consider inferolateral ischemia    < end of copied text >      ECHO:   < from: TTE Echo Complete w/Doppler (16 @ 14:15) >  IMPRESSION: Summary:   1. Left ventricular ejection fraction, by visual estimation, is 65 to   70%.   2. Normal global left ventricular systolic function.   3. Moderately increased LV wall thickness.   4. There is mild concentric left ventricular hypertrophy.   5. There is no evidence of pericardial effusion.   6. Sclerotic aortic valve with normal opening.   7. Moderately enlarged left atrium.    < end of copied text >      ACTIVE PROBLEMS:  HEALTH ISSUES - PROBLEM Dx:  TIA (transient ischemic attack): TIA (transient ischemic attack)  ALEIDA (acute kidney injury): ALEIDA (acute kidney injury)  Hypertension: Hypertension  Diabetes: Diabetes  High cholesterol: High cholesterol  Gastrointestinal hemorrhage, unspecified gastrointestinal hemorrhage type: Gastrointestinal hemorrhage, unspecified gastrointestinal hemorrhage type          Assessment and Plan:    KETAN MARKHAM is an 79y Male with under PMHx s/p ILR presenting to [facility] c/o weakness fatigue and black stools found to have severe anemia planning to undergo EGD today . Had been taking ASA and ibuprofen 4 tabs EOD  No CP at present    1) Preoperative Optimization         -No Cardiovascular Contraindication to EGD today       -Patient is acceptable risk for a low risk procedure       -Continue cardiac medications as scheduled       -Will follow                        JOAQUÍN DONOVAN MD, FACC, LYDIA

## 2020-01-15 NOTE — BRIEF OPERATIVE NOTE - OPERATION/FINDINGS
EGD: Large clean based prepyloric gastric antral ulcer-2 cm. Another 5 mm clean based gastric antral ulcer. Normal esophagus and duodenum

## 2020-01-15 NOTE — PHYSICAL THERAPY INITIAL EVALUATION ADULT - ADDITIONAL COMMENTS
Pt lives with wife in a 2 story house with 1 step to enter, 6 step to bedroom.  Independent with all PTA, with [facility].

## 2020-01-15 NOTE — PATIENT PROFILE ADULT - NSPROPTRIGHTREPPHONE_GEN_A_NUR
Spoke with Maurice Rosales at [facility] and they are requesting the NPI# to be added on the ordered. NPI# was added to the order and refaxed to E. [phone]

## 2020-01-15 NOTE — PROGRESS NOTE ADULT - SUBJECTIVE AND OBJECTIVE BOX
KETAN MARKHAM    [id number]    79y      Male    INTERVAL HPI/OVERNIGHT EVENTS: patient being seen for GI bleed. patient is s.p 2 units prbcs    REVIEW OF SYSTEMS:    CONSTITUTIONAL: No fever, weight loss, or fatigue  RESPIRATORY: No cough, wheezing, hemoptysis; No shortness of breath  CARDIOVASCULAR: No chest pain, palpitations  GASTROINTESTINAL: No abdominal or epigastric pain. No nausea, vomiting  NEUROLOGICAL: No headaches, memory loss, loss of strength.  MISCELLANEOUS:      Vital Signs Last 24 Hrs  T(C): 37.1 (15 Peterson 2020 09:18), Max: 37.1 (2020 19:05)  T(F): 98.7 (15 Peterson 2020 09:18), Max: 98.7 (2020 19:05)  HR: 58 (15 Peterson 2020 09:18) (58 - 69)  BP: 157/68 (15 Peterson 2020 09:18) (125/55 - 157/68)  BP(mean): --  RR: 20 (15 Peterson 2020 09:18) (16 - 20)  SpO2: 100% (15 Peterson 2020 09:18) (97% - 100%)    PHYSICAL EXAM:    GENERAL: NAD, well-groomed  HEENT: PERRL, +EOMI  NECK: soft, Supple, No JVD,   CHEST/LUNG: Clear to auscultation bilaterally; No wheezing  HEART: S1S2+, Regular rate and rhythm; No murmurs, rubs, or gallops  ABDOMEN: Soft, Nontender, Nondistended; Bowel sounds present  EXTREMITIES:  2+ Peripheral Pulses, No clubbing, cyanosis, or edema  SKIN: No rashes or lesions  NEURO: AAOX3, no focal deficits, no motor r sensory loss  PSYCH: normal mood      LABS:                        7.6    6.58  )-----------( 153      ( 15 Peterson 2020 06:08 )             23.3     01-15    138  |  105  |  37.0<H>  ----------------------------<  143<H>  4.2   |  23.0  |  1.10    Ca    8.5<L>      15 Peterson 2020 06:08  Phos  3.2     01-15  Mg     1.6     01-15    TPro  5.9<L>  /  Alb  3.9  /  TBili  0.3<L>  /  DBili  x   /  AST  12  /  ALT  8   /  AlkPhos  41  01-14    PT/INR - ( 15 Peterson 2020 06:08 )   PT: 12.1 sec;   INR: 1.05 ratio         PTT - ( 2020 12:01 )  PTT:25.2 sec  Urinalysis Basic - ( 2020 14:40 )    Color: Yellow / Appearance: Clear / S.020 / pH: x  Gluc: x / Ketone: Negative  / Bili: Negative / Urobili: Negative mg/dL   Blood: x / Protein: 100 mg/dL / Nitrite: Negative   Leuk Esterase: Moderate / RBC: 3-5 /HPF / WBC 26-50   Sq Epi: x / Non Sq Epi: Occasional / Bacteria: Few          MEDICATIONS  (STANDING):  amLODIPine   Tablet 5 milliGRAM(s) Oral daily  carvedilol 3.125 milliGRAM(s) Oral every 12 hours  dextrose 5%. 1000 milliLiter(s) (50 mL/Hr) IV Continuous <Continuous>  dextrose 50% Injectable 12.5 Gram(s) IV Push once  dextrose 50% Injectable 25 Gram(s) IV Push once  dextrose 50% Injectable 25 Gram(s) IV Push once  finasteride 5 milliGRAM(s) Oral daily  influenza   Vaccine 0.5 milliLiter(s) IntraMuscular once  insulin lispro (HumaLOG) corrective regimen sliding scale   SubCutaneous three times a day before meals  pantoprazole Infusion 8 mG/Hr (10 mL/Hr) IV Continuous <Continuous>  sodium chloride 0.9%. 1000 milliLiter(s) (70 mL/Hr) IV Continuous <Continuous>  tamsulosin 0.8 milliGRAM(s) Oral at bedtime    MEDICATIONS  (PRN):  acetaminophen   Tablet .. 650 milliGRAM(s) Oral every 6 hours PRN Temp greater or equal to 38C (100.4F), Mild Pain (1 - 3)  baclofen 10 milliGRAM(s) Oral three times a day PRN Muscle Spasm  dextrose 40% Gel 15 Gram(s) Oral once PRN Blood Glucose LESS THAN 70 milliGRAM(s)/deciliter  glucagon  Injectable 1 milliGRAM(s) IntraMuscular once PRN Glucose LESS THAN 70 milligrams/deciliter  meclizine 12.5 milliGRAM(s) Oral three times a day PRN Dizziness  ondansetron Injectable 4 milliGRAM(s) IV Push every 6 hours PRN Nausea and/or Vomiting      RADIOLOGY & ADDITIONAL TESTS:

## 2020-01-15 NOTE — BRIEF OPERATIVE NOTE - NSICDXBRIEFPREOP_GEN_ALL_CORE_FT
PRE-OP DIAGNOSIS:  Acute blood loss anemia 15-Peterson-2020 16:17:44  Solo Spicer  Melena 15-Peterson-2020 16:17:30  Solo Spicer

## 2020-01-15 NOTE — BRIEF OPERATIVE NOTE - COMMENTS
PPI bid  Carafate 1 gram po qid  Clear liquid diet  Resume diet tomorrow PPI bid  Carafate 1 gram po qid  Clear liquid diet  Resume diet tomorrow  Needs EGD in 3 months to confirm gastric ulcer healing

## 2020-01-16 ENCOUNTER — TRANSCRIPTION ENCOUNTER (OUTPATIENT)
Age: 80
End: 2020-01-16

## 2020-01-16 VITALS
SYSTOLIC BLOOD PRESSURE: 157 MMHG | DIASTOLIC BLOOD PRESSURE: 63 MMHG | OXYGEN SATURATION: 97 % | HEART RATE: 67 BPM | TEMPERATURE: 98 F | RESPIRATION RATE: 17 BRPM

## 2020-01-16 LAB
ALBUMIN SERPL ELPH-MCNC: 3.7 G/DL — SIGNIFICANT CHANGE UP (ref 3.3–5.2)
ALP SERPL-CCNC: 46 U/L — SIGNIFICANT CHANGE UP (ref 40–120)
ALT FLD-CCNC: 7 U/L — SIGNIFICANT CHANGE UP
ANION GAP SERPL CALC-SCNC: 10 MMOL/L — SIGNIFICANT CHANGE UP (ref 5–17)
AST SERPL-CCNC: 13 U/L — SIGNIFICANT CHANGE UP
BILIRUB SERPL-MCNC: 0.5 MG/DL — SIGNIFICANT CHANGE UP (ref 0.4–2)
BUN SERPL-MCNC: 19 MG/DL — SIGNIFICANT CHANGE UP (ref 8–20)
CALCIUM SERPL-MCNC: 8.7 MG/DL — SIGNIFICANT CHANGE UP (ref 8.6–10.2)
CHLORIDE SERPL-SCNC: 101 MMOL/L — SIGNIFICANT CHANGE UP (ref 98–107)
CO2 SERPL-SCNC: 24 MMOL/L — SIGNIFICANT CHANGE UP (ref 22–29)
CREAT SERPL-MCNC: 0.99 MG/DL — SIGNIFICANT CHANGE UP (ref 0.5–1.3)
GLUCOSE BLDC GLUCOMTR-MCNC: 145 MG/DL — HIGH (ref 70–99)
GLUCOSE BLDC GLUCOMTR-MCNC: 155 MG/DL — HIGH (ref 70–99)
GLUCOSE BLDC GLUCOMTR-MCNC: 163 MG/DL — HIGH (ref 70–99)
GLUCOSE SERPL-MCNC: 143 MG/DL — HIGH (ref 70–115)
HCT VFR BLD CALC: 23.9 % — LOW (ref 39–50)
HGB BLD-MCNC: 7.7 G/DL — LOW (ref 13–17)
MAGNESIUM SERPL-MCNC: 1.5 MG/DL — LOW (ref 1.6–2.6)
MCHC RBC-ENTMCNC: 30.1 PG — SIGNIFICANT CHANGE UP (ref 27–34)
MCHC RBC-ENTMCNC: 32.2 GM/DL — SIGNIFICANT CHANGE UP (ref 32–36)
MCV RBC AUTO: 93.4 FL — SIGNIFICANT CHANGE UP (ref 80–100)
PLATELET # BLD AUTO: 163 K/UL — SIGNIFICANT CHANGE UP (ref 150–400)
POTASSIUM SERPL-MCNC: 4 MMOL/L — SIGNIFICANT CHANGE UP (ref 3.5–5.3)
POTASSIUM SERPL-SCNC: 4 MMOL/L — SIGNIFICANT CHANGE UP (ref 3.5–5.3)
PROT SERPL-MCNC: 5.4 G/DL — LOW (ref 6.6–8.7)
RBC # BLD: 2.56 M/UL — LOW (ref 4.2–5.8)
RBC # FLD: 14.1 % — SIGNIFICANT CHANGE UP (ref 10.3–14.5)
SODIUM SERPL-SCNC: 135 MMOL/L — SIGNIFICANT CHANGE UP (ref 135–145)
WBC # BLD: 6.24 K/UL — SIGNIFICANT CHANGE UP (ref 3.8–10.5)
WBC # FLD AUTO: 6.24 K/UL — SIGNIFICANT CHANGE UP (ref 3.8–10.5)

## 2020-01-16 PROCEDURE — 99232 SBSQ HOSP IP/OBS MODERATE 35: CPT

## 2020-01-16 PROCEDURE — 80053 COMPREHEN METABOLIC PANEL: CPT

## 2020-01-16 PROCEDURE — 88342 IMHCHEM/IMCYTCHM 1ST ANTB: CPT

## 2020-01-16 PROCEDURE — 80048 BASIC METABOLIC PNL TOTAL CA: CPT

## 2020-01-16 PROCEDURE — 85730 THROMBOPLASTIN TIME PARTIAL: CPT

## 2020-01-16 PROCEDURE — 81001 URINALYSIS AUTO W/SCOPE: CPT

## 2020-01-16 PROCEDURE — 97110 THERAPEUTIC EXERCISES: CPT

## 2020-01-16 PROCEDURE — 93005 ELECTROCARDIOGRAM TRACING: CPT

## 2020-01-16 PROCEDURE — 96375 TX/PRO/DX INJ NEW DRUG ADDON: CPT

## 2020-01-16 PROCEDURE — 36415 COLL VENOUS BLD VENIPUNCTURE: CPT

## 2020-01-16 PROCEDURE — 97163 PT EVAL HIGH COMPLEX 45 MIN: CPT

## 2020-01-16 PROCEDURE — 36430 TRANSFUSION BLD/BLD COMPNT: CPT

## 2020-01-16 PROCEDURE — 97116 GAIT TRAINING THERAPY: CPT

## 2020-01-16 PROCEDURE — 84100 ASSAY OF PHOSPHORUS: CPT

## 2020-01-16 PROCEDURE — 82962 GLUCOSE BLOOD TEST: CPT

## 2020-01-16 PROCEDURE — 87086 URINE CULTURE/COLONY COUNT: CPT

## 2020-01-16 PROCEDURE — 85027 COMPLETE CBC AUTOMATED: CPT

## 2020-01-16 PROCEDURE — 99285 EMERGENCY DEPT VISIT HI MDM: CPT | Mod: 25

## 2020-01-16 PROCEDURE — 70450 CT HEAD/BRAIN W/O DYE: CPT

## 2020-01-16 PROCEDURE — 82272 OCCULT BLD FECES 1-3 TESTS: CPT

## 2020-01-16 PROCEDURE — 83735 ASSAY OF MAGNESIUM: CPT

## 2020-01-16 PROCEDURE — 84484 ASSAY OF TROPONIN QUANT: CPT

## 2020-01-16 PROCEDURE — 86901 BLOOD TYPING SEROLOGIC RH(D): CPT

## 2020-01-16 PROCEDURE — 99239 HOSP IP/OBS DSCHRG MGMT >30: CPT

## 2020-01-16 PROCEDURE — 86923 COMPATIBILITY TEST ELECTRIC: CPT

## 2020-01-16 PROCEDURE — 83036 HEMOGLOBIN GLYCOSYLATED A1C: CPT

## 2020-01-16 PROCEDURE — 93306 TTE W/DOPPLER COMPLETE: CPT

## 2020-01-16 PROCEDURE — P9016: CPT

## 2020-01-16 PROCEDURE — 85610 PROTHROMBIN TIME: CPT

## 2020-01-16 PROCEDURE — 86850 RBC ANTIBODY SCREEN: CPT

## 2020-01-16 PROCEDURE — 86900 BLOOD TYPING SEROLOGIC ABO: CPT

## 2020-01-16 PROCEDURE — 88305 TISSUE EXAM BY PATHOLOGIST: CPT

## 2020-01-16 PROCEDURE — 96374 THER/PROPH/DIAG INJ IV PUSH: CPT

## 2020-01-16 RX ORDER — SUCRALFATE 1 G
10 TABLET ORAL
Qty: 1200 | Refills: 0
Start: 2020-01-16 | End: 2020-02-14

## 2020-01-16 RX ORDER — SUCRALFATE 1 G
10 TABLET ORAL
Qty: 360 | Refills: 0
Start: 2020-01-16 | End: 2020-04-14

## 2020-01-16 RX ORDER — ASPIRIN/CALCIUM CARB/MAGNESIUM 324 MG
1 TABLET ORAL
Qty: 0 | Refills: 3 | DISCHARGE

## 2020-01-16 RX ORDER — MAGNESIUM SULFATE 500 MG/ML
2 VIAL (ML) INJECTION ONCE
Refills: 0 | Status: COMPLETED | OUTPATIENT
Start: 2020-01-16 | End: 2020-01-16

## 2020-01-16 RX ORDER — FERROUS SULFATE 325(65) MG
1 TABLET ORAL
Qty: 30 | Refills: 0
Start: 2020-01-16 | End: 2020-02-14

## 2020-01-16 RX ORDER — PANTOPRAZOLE SODIUM 20 MG/1
1 TABLET, DELAYED RELEASE ORAL
Qty: 180 | Refills: 0
Start: 2020-01-16 | End: 2020-04-14

## 2020-01-16 RX ORDER — ASPIRIN/CALCIUM CARB/MAGNESIUM 324 MG
1 TABLET ORAL
Qty: 30 | Refills: 3
Start: 2020-01-16 | End: 2020-05-14

## 2020-01-16 RX ADMIN — Medication 1 GRAM(S): at 12:56

## 2020-01-16 RX ADMIN — Medication 1: at 09:54

## 2020-01-16 RX ADMIN — Medication 1 GRAM(S): at 05:54

## 2020-01-16 RX ADMIN — AMLODIPINE BESYLATE 5 MILLIGRAM(S): 2.5 TABLET ORAL at 05:54

## 2020-01-16 RX ADMIN — FINASTERIDE 5 MILLIGRAM(S): 5 TABLET, FILM COATED ORAL at 12:56

## 2020-01-16 RX ADMIN — PANTOPRAZOLE SODIUM 40 MILLIGRAM(S): 20 TABLET, DELAYED RELEASE ORAL at 05:54

## 2020-01-16 RX ADMIN — CARVEDILOL PHOSPHATE 3.12 MILLIGRAM(S): 80 CAPSULE, EXTENDED RELEASE ORAL at 05:54

## 2020-01-16 RX ADMIN — Medication 50 GRAM(S): at 12:56

## 2020-01-16 NOTE — DISCHARGE NOTE PROVIDER - HOSPITAL COURSE
72 yr old male with hypertension, hyperlipidemia, diabetes mellitus, h/o GBS in 2015, TIA presents with complaints of dizziness, nausea, vomiting and diarrhea. States he was diagnosed with a UTI a few days prior, and was given a 10 day course of antibiotics which he completed yesterday. States he started to feel dizzy, unable to eat anything secondary to nausea and vomiting. He noted to have black stools for 2 days and given increasing shortness of breath on exertion He reports taking Ibuprofen 2-3 times a day, every other day for headaches.         patient admitted to medicine and seen by cardio and GI in consult.         Patient started on protonix drip and given 2 units prbcs:        egd:    · Operative Findings    EGD: Large clean based prepyloric gastric antral ulcer-2 cm. Another 5 mm clean based gastric antral ulcer. Normal esophagus and duodenum        patient now cleared for dc home.         time spent on dc 32 minutes

## 2020-01-16 NOTE — DISCHARGE NOTE NURSING/CASE MANAGEMENT/SOCIAL WORK - PATIENT PORTAL LINK FT
You can access the FollowMyHealth Patient Portal offered by [facility] by registering at the following website: [email or web address]. By joining [facility]’s FollowMyHealth portal, you will also be able to view your health information using other applications (apps) compatible with our system.

## 2020-01-16 NOTE — PROGRESS NOTE ADULT - ASSESSMENT
72 yr old male with hypertension, hyperlipidemia, diabetes mellitus, h/o GBS in 2015, TIA presents with complaints of dizziness, nausea, vomiting and diarrhea started 4 days ago, and black stools for 2 days. Recently completed a course of antibiotics for a UTI. CT head done on admission negative for a stroke. Labs reveal Hb 7.2 is s.p 2 unit prbcs     Problem/Plan - 1:  ·  Problem: Gastrointestinal hemorrhage, - for egd today   --> s.p 2 units prbcs     Problem/Plan - 2:  ·  Problem: R/O Clostridium difficile diarrhea. cancel c dif
Acute anemia: Due to gastric ulcer. Continue PPI bid and carafate. Advance diet. If tolerates solid diet, can be discharged home with office FU with me in 4-6 weeks. Needs repeat EGD in 3 months.

## 2020-01-16 NOTE — PROGRESS NOTE ADULT - SUBJECTIVE AND OBJECTIVE BOX
INTERVAL HPI/OVERNIGHT EVENTS:FU for GI bleeding. s/p EGD yesterday. Large gastric antral ulcer. No active bleeding. No complaints last night.     MEDICATIONS  (STANDING):  amLODIPine   Tablet 5 milliGRAM(s) Oral daily  carvedilol 3.125 milliGRAM(s) Oral every 12 hours  dextrose 5%. 1000 milliLiter(s) (50 mL/Hr) IV Continuous <Continuous>  dextrose 50% Injectable 12.5 Gram(s) IV Push once  dextrose 50% Injectable 25 Gram(s) IV Push once  dextrose 50% Injectable 25 Gram(s) IV Push once  finasteride 5 milliGRAM(s) Oral daily  influenza   Vaccine 0.5 milliLiter(s) IntraMuscular once  insulin lispro (HumaLOG) corrective regimen sliding scale   SubCutaneous three times a day before meals  pantoprazole    Tablet 40 milliGRAM(s) Oral every 12 hours  sodium chloride 0.9%. 1000 milliLiter(s) (70 mL/Hr) IV Continuous <Continuous>  sucralfate suspension 1 Gram(s) Oral four times a day  tamsulosin 0.8 milliGRAM(s) Oral at bedtime    MEDICATIONS  (PRN):  acetaminophen   Tablet .. 650 milliGRAM(s) Oral every 6 hours PRN Temp greater or equal to 38C (100.4F), Mild Pain (1 - 3)  baclofen 10 milliGRAM(s) Oral three times a day PRN Muscle Spasm  dextrose 40% Gel 15 Gram(s) Oral once PRN Blood Glucose LESS THAN 70 milliGRAM(s)/deciliter  glucagon  Injectable 1 milliGRAM(s) IntraMuscular once PRN Glucose LESS THAN 70 milligrams/deciliter  meclizine 12.5 milliGRAM(s) Oral three times a day PRN Dizziness  ondansetron Injectable 4 milliGRAM(s) IV Push every 6 hours PRN Nausea and/or Vomiting      Allergies    No Known Allergies    Intolerances        Vital Signs Last 24 Hrs  T(C): 37.1 (2020 05:47), Max: 37.1 (15 Peterson 2020 09:18)  T(F): 98.7 (2020 05:47), Max: 98.7 (15 Peterson 2020 09:18)  HR: 66 (2020 05:47) (58 - 66)  BP: 166/67 (2020 05:47) (136/62 - 166/67)  BP(mean): 99 (15 Peterson 2020 18:10) (99 - 99)  RR: 19 (2020 05:47) (19 - 20)  SpO2: 94% (2020 05:47) (94% - 100%)    LABS:                        7.7    6.24  )-----------( 163      ( 2020 06:04 )             23.9     01-16    135  |  101  |  19.0  ----------------------------<  143<H>  4.0   |  24.0  |  0.99    Ca    8.7      2020 06:04  Phos  3.2     01-15  Mg     1.5     -16    TPro  5.4<L>  /  Alb  3.7  /  TBili  0.5  /  DBili  x   /  AST  13  /  ALT  7   /  AlkPhos  46  01-16    PT/INR - ( 15 Peterson 2020 06:08 )   PT: 12.1 sec;   INR: 1.05 ratio         PTT - ( 2020 12:01 )  PTT:25.2 sec  Urinalysis Basic - ( 2020 14:40 )    Color: Yellow / Appearance: Clear / S.020 / pH: x  Gluc: x / Ketone: Negative  / Bili: Negative / Urobili: Negative mg/dL   Blood: x / Protein: 100 mg/dL / Nitrite: Negative   Leuk Esterase: Moderate / RBC: 3-5 /HPF / WBC 26-50   Sq Epi: x / Non Sq Epi: Occasional / Bacteria: Few        RADIOLOGY & ADDITIONAL TESTS:

## 2020-01-16 NOTE — PROGRESS NOTE ADULT - SUBJECTIVE AND OBJECTIVE BOX
[facility]                                   [address] [place] [address]                                                      PHONE: [phone]                                                         FAX: [phone]  [email or web address]  ---------------------------------------------------------------------------------------------------------------------------------    Overnight events/patient complaints:  NAD post EGD     No Known Allergies    MEDICATIONS  (STANDING):  amLODIPine   Tablet 5 milliGRAM(s) Oral daily  carvedilol 3.125 milliGRAM(s) Oral every 12 hours  dextrose 5%. 1000 milliLiter(s) (50 mL/Hr) IV Continuous <Continuous>  dextrose 50% Injectable 12.5 Gram(s) IV Push once  dextrose 50% Injectable 25 Gram(s) IV Push once  dextrose 50% Injectable 25 Gram(s) IV Push once  finasteride 5 milliGRAM(s) Oral daily  influenza   Vaccine 0.5 milliLiter(s) IntraMuscular once  insulin lispro (HumaLOG) corrective regimen sliding scale   SubCutaneous three times a day before meals  magnesium sulfate  IVPB 2 Gram(s) IV Intermittent once  pantoprazole    Tablet 40 milliGRAM(s) Oral every 12 hours  sucralfate suspension 1 Gram(s) Oral four times a day  tamsulosin 0.8 milliGRAM(s) Oral at bedtime    MEDICATIONS  (PRN):  acetaminophen   Tablet .. 650 milliGRAM(s) Oral every 6 hours PRN Temp greater or equal to 38C (100.4F), Mild Pain (1 - 3)  baclofen 10 milliGRAM(s) Oral three times a day PRN Muscle Spasm  dextrose 40% Gel 15 Gram(s) Oral once PRN Blood Glucose LESS THAN 70 milliGRAM(s)/deciliter  glucagon  Injectable 1 milliGRAM(s) IntraMuscular once PRN Glucose LESS THAN 70 milligrams/deciliter  meclizine 12.5 milliGRAM(s) Oral three times a day PRN Dizziness  ondansetron Injectable 4 milliGRAM(s) IV Push every 6 hours PRN Nausea and/or Vomiting      Vital Signs Last 24 Hrs  T(C): 37.1 (2020 05:47), Max: 37.1 (15 Peterson 2020 09:18)  T(F): 98.7 (2020 05:47), Max: 98.7 (15 Peterson 2020 09:18)  HR: 66 (2020 05:47) (58 - 66)  BP: 166/67 (2020 05:47) (136/62 - 166/67)  BP(mean): 99 (15 Peterson 2020 18:10) (99 - 99)  RR: 19 (2020 05:47) (19 - 20)  SpO2: 94% (2020 05:47) (94% - 100%)  ICU Vital Signs Last 24 Hrs  KETAN MARKHAM  I&O's Detail    15 Peterson 2020 07:01  -  2020 07:00  --------------------------------------------------------  IN:  Total IN: 0 mL    OUT:    Voided: 350 mL  Total OUT: 350 mL    Total NET: -350 mL        I&O's Summary    15 Peterson 2020 07:01  -  2020 07:00  --------------------------------------------------------  IN: 0 mL / OUT: 350 mL / NET: -350 mL      Drug Dosing Weight  KETAN LUISAKWESI      PHYSICAL EXAM:  General: Appears well developed, well nourished alert and cooperative.  HEENT: Head; normocephalic, atraumatic.  Eyes: Pupils reactive, cornea wnl.  Neck: Supple, no nodes adenopathy, no NVD or carotid bruit or thyromegaly.  CARDIOVASCULAR: Normal S1 and S2, 1/6 murmur, rub, gallop or lift.   LUNGS: No rales, rhonchi or wheeze. Normal breath sounds bilaterally.  ABDOMEN: Soft, nontender without mass or organomegaly. bowel sounds normoactive.  EXTREMITIES: No clubbing, cyanosis or edema. Distal pulses wnl.   SKIN: warm and dry with normal turgor.  NEURO: Alert/oriented x 3/normal motor exam. No pathologic reflexes.    PSYCH: normal affect.        LABS:                        7.7    6.24  )-----------( 163      ( 2020 06:04 )             23.9     01-16    135  |  101  |  19.0  ----------------------------<  143<H>  4.0   |  24.0  |  0.99    Ca    8.7      2020 06:04  Phos  3.2     01-15  Mg     1.5     01-16    TPro  5.4<L>  /  Alb  3.7  /  TBili  0.5  /  DBili  x   /  AST  13  /  ALT  7   /  AlkPhos  46  01-16    KETAN SHAHRZAD  CARDIAC MARKERS ( 2020 12:01 )  x     / <0.01 ng/mL / x     / x     / x          PT/INR - ( 15 Peterson 2020 06:08 )   PT: 12.1 sec;   INR: 1.05 ratio         PTT - ( 2020 12:01 )  PTT:25.2 sec  Urinalysis Basic - ( 2020 14:40 )    Color: Yellow / Appearance: Clear / S.020 / pH: x  Gluc: x / Ketone: Negative  / Bili: Negative / Urobili: Negative mg/dL   Blood: x / Protein: 100 mg/dL / Nitrite: Negative   Leuk Esterase: Moderate / RBC: 3-5 /HPF / WBC 26-50   Sq Epi: x / Non Sq Epi: Occasional / Bacteria: Few        RADIOLOGY & ADDITIONAL STUDIES:    INTERPRETATION OF TELEMETRY (personally reviewed):  MPRESSION: Summary:   1. Left ventricular ejection fraction, by visual estimation, is 65 to   70%.   2. Normal global left ventricular systolic function.   3. Moderately increased LV wall thickness.   4. There is mild concentric left ventricular hypertrophy.   5. There is no evidence of pericardial effusion.   6. Sclerotic aortic valve with normal opening.   7. Moderately enlarged left atrium.     MD Nuha Electronically signed on 2016 at 10:43:22 AM        ASSESSMENT AND PLAN:  In summary, KETAN MARKHAM is an 79y Male with past medical history significant for TIA found to have GI bleed anemia due to gastric ulcer 2 cm; Doing well from CV standpoint

## 2020-01-16 NOTE — DISCHARGE NOTE PROVIDER - NSDCMRMEDTOKEN_GEN_ALL_CORE_FT
alfuzosin 10 mg oral tablet, extended release: 1 tab(s) orally once a day  amLODIPine 5 mg oral tablet: 1 tab(s) orally once a day  Antivert 12.5 mg oral tablet: 1 tab(s) orally 3 times a day, As Needed  aspirin 81 mg oral tablet: 1 tab(s) orally once a day  HOLD For 5 days   Avodart 0.5 mg oral capsule: 1 cap(s) orally once a day  baclofen 10 mg oral tablet: 1 tab(s) orally 3 times a day, As Needed  carvedilol 3.125 mg oral tablet: 1 tab(s) orally 2 times a day  ezetimibe 10 mg oral tablet: 1 tab(s) orally once a day  ferrous sulfate 325 mg (65 mg elemental iron) oral delayed release tablet: 1 tab(s) orally once a day   metFORMIN 1000 mg oral tablet:  orally once a day  pantoprazole 40 mg oral delayed release tablet: 1 tab(s) orally every 12 hours  quinapril 40 mg oral tablet: 1 tab(s) orally once a day  sucralfate 1 g/10 mL oral suspension: 10 milliliter(s) orally 4 times a day  Topamax 25 mg oral tablet: 1 tab(s) orally 2 times a day, As Needed

## 2020-01-16 NOTE — DISCHARGE NOTE PROVIDER - NSDCCPCAREPLAN_GEN_ALL_CORE_FT
PRINCIPAL DISCHARGE DIAGNOSIS  Diagnosis: Iron deficiency anemia due to chronic blood loss  Assessment and Plan of Treatment:       SECONDARY DISCHARGE DIAGNOSES  Diagnosis: Gastrointestinal hemorrhage, unspecified gastrointestinal hemorrhage type  Assessment and Plan of Treatment:

## 2020-01-16 NOTE — DISCHARGE NOTE PROVIDER - CARE PROVIDER_API CALL
primary care,   Phone: (   )    -  Fax: (   )    -  Follow Up Time:     Solo Spicer)  Gastroenterology; Internal Medicine  [address] [place] [address]  Phone: [phone]  Fax: [phone]  Follow Up Time:

## 2020-01-17 LAB — SURGICAL PATHOLOGY STUDY: SIGNIFICANT CHANGE UP

## 2020-01-20 ENCOUNTER — OTHER (OUTPATIENT)
Age: 80
End: 2020-01-20

## 2020-01-31 ENCOUNTER — APPOINTMENT (OUTPATIENT)
Dept: GASTROENTEROLOGY | Facility: CLINIC | Age: 80
End: 2020-01-31
Payer: MEDICARE

## 2020-01-31 VITALS
RESPIRATION RATE: 14 BRPM | OXYGEN SATURATION: 97 % | DIASTOLIC BLOOD PRESSURE: 64 MMHG | BODY MASS INDEX: 34.36 KG/M2 | SYSTOLIC BLOOD PRESSURE: 112 MMHG | WEIGHT: 240 LBS | HEIGHT: 70 IN | HEART RATE: 72 BPM

## 2020-01-31 DIAGNOSIS — D62 ACUTE POSTHEMORRHAGIC ANEMIA: ICD-10-CM

## 2020-01-31 DIAGNOSIS — K25.3 ACUTE GASTRIC ULCER W/OUT HEMORRHAGE OR PERFORATION: ICD-10-CM

## 2020-01-31 PROCEDURE — 99213 OFFICE O/P EST LOW 20 MIN: CPT

## 2020-01-31 NOTE — PHYSICAL EXAM
[General Appearance - Alert] : alert [General Appearance - In No Acute Distress] : in no acute distress [Sclera] : the sclera and conjunctiva were normal [Extraocular Movements] : extraocular movements were intact [PERRL With Normal Accommodation] : pupils were equal in size, round, and reactive to light [Outer Ear] : the ears and nose were normal in appearance [Oropharynx] : the oropharynx was normal [Neck Appearance] : the appearance of the neck was normal [Neck Cervical Mass (___cm)] : no neck mass was observed [Jugular Venous Distention Increased] : there was no jugular-venous distention [Thyroid Diffuse Enlargement] : the thyroid was not enlarged [Thyroid Nodule] : there were no palpable thyroid nodules [Auscultation Breath Sounds / Voice Sounds] : lungs were clear to auscultation bilaterally [Heart Rate And Rhythm] : heart rate was normal and rhythm regular [Heart Sounds] : normal S1 and S2 [Heart Sounds Gallop] : no gallops [Murmurs] : no murmurs [Heart Sounds Pericardial Friction Rub] : no pericardial rub [Full Pulse] : the pedal pulses are present [Edema] : there was no peripheral edema [Bowel Sounds] : normal bowel sounds [Abdomen Soft] : soft [Abdomen Tenderness] : non-tender [Abdomen Mass (___ Cm)] : no abdominal mass palpated [Cervical Lymph Nodes Enlarged Posterior Bilaterally] : posterior cervical [Cervical Lymph Nodes Enlarged Anterior Bilaterally] : anterior cervical [Supraclavicular Lymph Nodes Enlarged Bilaterally] : supraclavicular [No CVA Tenderness] : no ~M costovertebral angle tenderness [No Spinal Tenderness] : no spinal tenderness [Abnormal Walk] : normal gait [Nail Clubbing] : no clubbing  or cyanosis of the fingernails [Musculoskeletal - Swelling] : no joint swelling seen [Motor Tone] : muscle strength and tone were normal [Skin Color & Pigmentation] : normal skin color and pigmentation [Skin Turgor] : normal skin turgor [] : no rash [No Focal Deficits] : no focal deficits [Oriented To Time, Place, And Person] : oriented to person, place, and time [Impaired Insight] : insight and judgment were intact [Affect] : the affect was normal

## 2020-01-31 NOTE — HISTORY OF PRESENT ILLNESS
[de-identified] : The patient arrived for followup after hospitalization for acute anemia. He underwent EGD which revealed gastric ulcer. Biopsies were negative for any Helicobacter pylori. He was taking additonal Motrin besides his regular aspirin. He is on PPI and carafte. He has no more bleeding. He is still having the weakness.

## 2020-01-31 NOTE — ASSESSMENT
[FreeTextEntry1] : At this time, continue PPI and Carafate. We will check his blood count in 4- 6 weeks. We will then followup in 6 weeks. We will schedule him for a repeat EGD in 3 months to document gastric ulcer healing.\par \par Solo Spicer MD\par Gastroenterology \par \par

## 2020-03-31 ENCOUNTER — APPOINTMENT (OUTPATIENT)
Dept: GASTROENTEROLOGY | Facility: CLINIC | Age: 80
End: 2020-03-31

## 2021-03-16 NOTE — CONSULT NOTE ADULT - ASSESSMENT
Patient with acute anemia, on aspirin. ? Melena. ? PUD, unlikely malignancy.  Transfuse PRBC as needed to keep hct > 24.   2 large bore IV lines.   CBC q 8 hourly  NPO  PPI infusion  Consult cardiology  Possible EGD tomorrow. Affect and characteristics of appearance, verbalizations, behaviors are appropriate negative

## 2021-10-30 ENCOUNTER — TRANSCRIPTION ENCOUNTER (OUTPATIENT)
Age: 81
End: 2021-10-30

## 2021-10-30 ENCOUNTER — INPATIENT (INPATIENT)
Facility: HOSPITAL | Age: 81
LOS: 2 days | Discharge: HOME | DRG: 62 | End: 2021-11-02
Attending: HOSPITALIST | Admitting: NEUROLOGICAL SURGERY
Payer: MEDICARE

## 2021-10-30 VITALS — HEIGHT: 72 IN

## 2021-10-30 DIAGNOSIS — Z98.89 OTHER SPECIFIED POSTPROCEDURAL STATES: Chronic | ICD-10-CM

## 2021-10-30 DIAGNOSIS — I63.9 CEREBRAL INFARCTION, UNSPECIFIED: ICD-10-CM

## 2021-10-30 DIAGNOSIS — M48.07 SPINAL STENOSIS, LUMBOSACRAL REGION: Chronic | ICD-10-CM

## 2021-10-30 LAB
ALBUMIN SERPL ELPH-MCNC: 4.1 G/DL — SIGNIFICANT CHANGE UP (ref 3.3–5.2)
ALP SERPL-CCNC: 66 U/L — SIGNIFICANT CHANGE UP (ref 40–120)
ALT FLD-CCNC: 10 U/L — SIGNIFICANT CHANGE UP
ANION GAP SERPL CALC-SCNC: 12 MMOL/L — SIGNIFICANT CHANGE UP (ref 5–17)
APTT BLD: 26.8 SEC — LOW (ref 27.5–35.5)
AST SERPL-CCNC: 15 U/L — SIGNIFICANT CHANGE UP
BASOPHILS # BLD AUTO: 0.05 K/UL — SIGNIFICANT CHANGE UP (ref 0–0.2)
BASOPHILS NFR BLD AUTO: 0.7 % — SIGNIFICANT CHANGE UP (ref 0–2)
BILIRUB SERPL-MCNC: 0.3 MG/DL — LOW (ref 0.4–2)
BUN SERPL-MCNC: 22.2 MG/DL — HIGH (ref 8–20)
CALCIUM SERPL-MCNC: 9.5 MG/DL — SIGNIFICANT CHANGE UP (ref 8.6–10.2)
CHLORIDE SERPL-SCNC: 98 MMOL/L — SIGNIFICANT CHANGE UP (ref 98–107)
CO2 SERPL-SCNC: 24 MMOL/L — SIGNIFICANT CHANGE UP (ref 22–29)
CREAT SERPL-MCNC: 1.07 MG/DL — SIGNIFICANT CHANGE UP (ref 0.5–1.3)
EOSINOPHIL # BLD AUTO: 0.18 K/UL — SIGNIFICANT CHANGE UP (ref 0–0.5)
EOSINOPHIL NFR BLD AUTO: 2.4 % — SIGNIFICANT CHANGE UP (ref 0–6)
GLUCOSE BLDC GLUCOMTR-MCNC: 161 MG/DL — HIGH (ref 70–99)
GLUCOSE SERPL-MCNC: 217 MG/DL — HIGH (ref 70–99)
HCT VFR BLD CALC: 38.1 % — LOW (ref 39–50)
HGB BLD-MCNC: 13.2 G/DL — SIGNIFICANT CHANGE UP (ref 13–17)
IMM GRANULOCYTES NFR BLD AUTO: 0.3 % — SIGNIFICANT CHANGE UP (ref 0–1.5)
INR BLD: 1.02 RATIO — SIGNIFICANT CHANGE UP (ref 0.88–1.16)
LYMPHOCYTES # BLD AUTO: 2.56 K/UL — SIGNIFICANT CHANGE UP (ref 1–3.3)
LYMPHOCYTES # BLD AUTO: 33.9 % — SIGNIFICANT CHANGE UP (ref 13–44)
MCHC RBC-ENTMCNC: 31.3 PG — SIGNIFICANT CHANGE UP (ref 27–34)
MCHC RBC-ENTMCNC: 34.6 GM/DL — SIGNIFICANT CHANGE UP (ref 32–36)
MCV RBC AUTO: 90.3 FL — SIGNIFICANT CHANGE UP (ref 80–100)
MONOCYTES # BLD AUTO: 0.61 K/UL — SIGNIFICANT CHANGE UP (ref 0–0.9)
MONOCYTES NFR BLD AUTO: 8.1 % — SIGNIFICANT CHANGE UP (ref 2–14)
NEUTROPHILS # BLD AUTO: 4.13 K/UL — SIGNIFICANT CHANGE UP (ref 1.8–7.4)
NEUTROPHILS NFR BLD AUTO: 54.6 % — SIGNIFICANT CHANGE UP (ref 43–77)
PLATELET # BLD AUTO: 168 K/UL — SIGNIFICANT CHANGE UP (ref 150–400)
POTASSIUM SERPL-MCNC: 4.5 MMOL/L — SIGNIFICANT CHANGE UP (ref 3.5–5.3)
POTASSIUM SERPL-SCNC: 4.5 MMOL/L — SIGNIFICANT CHANGE UP (ref 3.5–5.3)
PROT SERPL-MCNC: 6.8 G/DL — SIGNIFICANT CHANGE UP (ref 6.6–8.7)
PROTHROM AB SERPL-ACNC: 11.8 SEC — SIGNIFICANT CHANGE UP (ref 10.6–13.6)
RBC # BLD: 4.22 M/UL — SIGNIFICANT CHANGE UP (ref 4.2–5.8)
RBC # FLD: 12.2 % — SIGNIFICANT CHANGE UP (ref 10.3–14.5)
SARS-COV-2 RNA SPEC QL NAA+PROBE: SIGNIFICANT CHANGE UP
SODIUM SERPL-SCNC: 134 MMOL/L — LOW (ref 135–145)
TROPONIN T SERPL-MCNC: <0.01 NG/ML — SIGNIFICANT CHANGE UP (ref 0–0.06)
WBC # BLD: 7.55 K/UL — SIGNIFICANT CHANGE UP (ref 3.8–10.5)
WBC # FLD AUTO: 7.55 K/UL — SIGNIFICANT CHANGE UP (ref 3.8–10.5)

## 2021-10-30 PROCEDURE — 93010 ELECTROCARDIOGRAM REPORT: CPT

## 2021-10-30 PROCEDURE — 99291 CRITICAL CARE FIRST HOUR: CPT | Mod: GC

## 2021-10-30 PROCEDURE — 0042T: CPT

## 2021-10-30 PROCEDURE — 99291 CRITICAL CARE FIRST HOUR: CPT

## 2021-10-30 PROCEDURE — 70496 CT ANGIOGRAPHY HEAD: CPT | Mod: 26,MA

## 2021-10-30 PROCEDURE — 70498 CT ANGIOGRAPHY NECK: CPT | Mod: 26,MA

## 2021-10-30 RX ORDER — SODIUM CHLORIDE 9 MG/ML
1000 INJECTION, SOLUTION INTRAVENOUS
Refills: 0 | Status: DISCONTINUED | OUTPATIENT
Start: 2021-10-30 | End: 2021-11-02

## 2021-10-30 RX ORDER — SODIUM CHLORIDE 9 MG/ML
50 INJECTION INTRAMUSCULAR; INTRAVENOUS; SUBCUTANEOUS
Refills: 0 | Status: DISCONTINUED | OUTPATIENT
Start: 2021-10-30 | End: 2021-10-30

## 2021-10-30 RX ORDER — SODIUM CHLORIDE 9 MG/ML
1000 INJECTION INTRAMUSCULAR; INTRAVENOUS; SUBCUTANEOUS
Refills: 0 | Status: DISCONTINUED | OUTPATIENT
Start: 2021-10-30 | End: 2021-10-31

## 2021-10-30 RX ORDER — NICARDIPINE HYDROCHLORIDE 30 MG/1
5 CAPSULE, EXTENDED RELEASE ORAL
Qty: 40 | Refills: 0 | Status: DISCONTINUED | OUTPATIENT
Start: 2021-10-30 | End: 2021-10-31

## 2021-10-30 RX ORDER — DEXTROSE 50 % IN WATER 50 %
25 SYRINGE (ML) INTRAVENOUS ONCE
Refills: 0 | Status: DISCONTINUED | OUTPATIENT
Start: 2021-10-30 | End: 2021-11-02

## 2021-10-30 RX ORDER — HYDRALAZINE HCL 50 MG
5 TABLET ORAL EVERY 4 HOURS
Refills: 0 | Status: DISCONTINUED | OUTPATIENT
Start: 2021-10-30 | End: 2021-10-31

## 2021-10-30 RX ORDER — HYDRALAZINE HCL 50 MG
10 TABLET ORAL ONCE
Refills: 0 | Status: COMPLETED | OUTPATIENT
Start: 2021-10-30 | End: 2021-10-30

## 2021-10-30 RX ORDER — DEXTROSE 50 % IN WATER 50 %
12.5 SYRINGE (ML) INTRAVENOUS ONCE
Refills: 0 | Status: DISCONTINUED | OUTPATIENT
Start: 2021-10-30 | End: 2021-11-02

## 2021-10-30 RX ORDER — GLUCAGON INJECTION, SOLUTION 0.5 MG/.1ML
1 INJECTION, SOLUTION SUBCUTANEOUS ONCE
Refills: 0 | Status: DISCONTINUED | OUTPATIENT
Start: 2021-10-30 | End: 2021-11-02

## 2021-10-30 RX ORDER — ALTEPLASE 100 MG
81 KIT INTRAVENOUS ONCE
Refills: 0 | Status: COMPLETED | OUTPATIENT
Start: 2021-10-30 | End: 2021-10-30

## 2021-10-30 RX ORDER — LABETALOL HCL 100 MG
10 TABLET ORAL EVERY 4 HOURS
Refills: 0 | Status: DISCONTINUED | OUTPATIENT
Start: 2021-10-30 | End: 2021-10-31

## 2021-10-30 RX ORDER — INSULIN LISPRO 100/ML
VIAL (ML) SUBCUTANEOUS EVERY 6 HOURS
Refills: 0 | Status: DISCONTINUED | OUTPATIENT
Start: 2021-10-30 | End: 2021-10-31

## 2021-10-30 RX ORDER — ALTEPLASE 100 MG
9 KIT INTRAVENOUS ONCE
Refills: 0 | Status: COMPLETED | OUTPATIENT
Start: 2021-10-30 | End: 2021-10-30

## 2021-10-30 RX ORDER — DEXTROSE 50 % IN WATER 50 %
15 SYRINGE (ML) INTRAVENOUS ONCE
Refills: 0 | Status: DISCONTINUED | OUTPATIENT
Start: 2021-10-30 | End: 2021-11-02

## 2021-10-30 RX ADMIN — Medication 10 MILLIGRAM(S): at 18:06

## 2021-10-30 RX ADMIN — ALTEPLASE 540 MILLIGRAM(S): KIT at 18:13

## 2021-10-30 RX ADMIN — ALTEPLASE 81 MILLIGRAM(S): KIT at 19:06

## 2021-10-30 RX ADMIN — ALTEPLASE 81 MILLIGRAM(S): KIT at 18:14

## 2021-10-30 RX ADMIN — ALTEPLASE 9 MILLIGRAM(S): KIT at 18:14

## 2021-10-30 RX ADMIN — NICARDIPINE HYDROCHLORIDE 25 MG/HR: 30 CAPSULE, EXTENDED RELEASE ORAL at 19:00

## 2021-10-30 NOTE — H&P ADULT - HISTORY OF PRESENT ILLNESS
81M with PMHX TIA 2006, Guillian [place] 2006 s/p flu vaccine (baseline gait imbalance per wife), HTN, DM now insulin dependant, cholecystectomy and hernia repair surgery x2 presents with complaints of right facial droop and dysarthria. As per wife, patient had felt off and more tired than per usual. Patient's wife states that his Last known well was around 2 pm when he went for a nap.  She noticed around 4pm after he had woke up, he had slight right facial and mild dysarthria. She stated while she was on the phone with her daughter-in-law, the facial droop became more profound and his dysarthria worsened within 10 minutes. Patient arrived to [facility] as CODE stroke. NIH on arrival was a 3. /84 on arrival. 10mg IVP hydralazine given. CTH negative for intracranial hemorrhage. CTP: no core infarct/ no penumbra. CTA: negative for LVO. TPA was administered at 1813. Patient admitted to NSICU    LKW 2pm, NIH 3, MRS 0

## 2021-10-30 NOTE — ED ADULT NURSE NOTE - CHIEF COMPLAINT QUOTE
pt c/o not feel well all day "I was just tired" and pt took a nap at 130pm and woke up with slurred speech and left side facial droop, pt has hx of guillain barre, code stroke called

## 2021-10-30 NOTE — ED ADULT NURSE NOTE - NSICDXPASTMEDICALHX_GEN_ALL_CORE_FT
PAST MEDICAL HISTORY:  Diabetes     Guillain Barré syndrome diagnosed oct 2015 treated at [facility]; under care of neurologist.    High cholesterol     Hypertension

## 2021-10-30 NOTE — ED PROVIDER NOTE - NS ED ROS FT
ROS:  GEN: (-) fevers/chills  NECK: (-) stiffness, (-) swelling  RESP: (-) shortness of breath, (-) cough  CV: (-) chest pain, (-) palpitations  GI: (-) nausea, (-) vomiting, (-) pain  : (-) hematuria, (-) dysuria  EXT: (-) edema  NEURO: (-) weakness, (-) headache  MSK: (-) muscle pain

## 2021-10-30 NOTE — ED ADULT NURSE NOTE - NSIMPLEMENTINTERV_GEN_ALL_ED
Implemented All Fall with Harm Risk Interventions:  [place] to call system. Call bell, personal items and telephone within reach. Instruct patient to call for assistance. Room bathroom lighting operational. Non-slip footwear when patient is off stretcher. Physically safe environment: no spills, clutter or unnecessary equipment. Stretcher in lowest position, wheels locked, appropriate side rails in place. Provide visual cue, wrist band, yellow gown, etc. Monitor gait and stability. Monitor for mental status changes and reorient to person, place, and time. Review medications for side effects contributing to fall risk. Reinforce activity limits and safety measures with patient and family. Provide visual clues: red socks.

## 2021-10-30 NOTE — ED ADULT NURSE NOTE - OBJECTIVE STATEMENT
pt was called code stroke woke up from nap at approx 4pm with facial droop and slurring words, pt denies fever sweats chills "was not feeling right" wife was concerned forced pt to go to Ed code stroke called tele stroke at bedside,

## 2021-10-30 NOTE — ED ADULT NURSE REASSESSMENT NOTE - NS ED NURSE REASSESS COMMENT FT1
Received report and assumed pt care at 1915.  Pt is alert and oriented X 4, post TPA infusion.  Wife at bedside.  Side rails up.  Nicardipine gtt infusing well through patent IV in left AC.  Minor bleeding to right side of tongue, controlled with mild pressure.  Neuro ICU MD Jiménez? at bedside and is aware.

## 2021-10-30 NOTE — H&P ADULT - ASSESSMENT
ASSESSMENT:  81 M PMHX TIA 2006, Guillian [place] 2006 s/p flu vaccine (baseline gait imbalance per wife), HTN, DM now insulin dependant, cholecystectomy and hernia repair surgery x2 presents with complaints of right side facial, dysarthria s/p TPA 1813. CTH: no intracranial heme. CTP: no core infarct/ perfusion mismatch. CTA: no LVO  LKW 2pm. NIH 3, MRS 0,   hx TIA, Guillian [place], DM, HTN    PLAN:  Neuro:  -Admit to NSICU  -Neuro checks per TPA protocol then q1 hour  -pending stroke core measures A1C, Lipid, TSH  -pending repeat CTH 10/31 at 6pm r/o heme conversion. if no evidence of hemorrhage on CT scan tomorrow, may initiate aspirin  -pending MRI  -pending PT/ OT / speech  -neurology consult    CV:  -SBP goal 120-180  -PRN: Labetalol/ hydralazine/ cardene gtt  -pending EKG  -TTE pending  -LED pending    Respiratory:  -RA  -PRN NC spo2 <92%  -PRN duonebs for bronchospasm/ wheezing    GI:  -NPO xcept meds  -dysphagia screen    :  -NS @ 50cc/hr  -Monitor NA/ Scr/ electrolytes    ID:  -Monitor for leukocytosis and fever    Heme:  -Hold aspirin/ lovenox in the setting of TPA administration  -likely start aspirin 10/31 if no evidence of heme conversion on CTH/MRI  -VTE prophylaxis: SCDS    Endo:  -hx insulin dependant DM  -MISS   -pending A1C| TSH

## 2021-10-30 NOTE — ED ADULT TRIAGE NOTE - CHIEF COMPLAINT QUOTE
pt c/o not feel well all day "I was just tired" and pt took a nap at 130pm and woke up with slurred speech and left side facial droop, pt has hx of guillain barre, code stroke called and pt c/o not feel well all day "I was just tired" and pt took a nap at 130pm and woke up with slurred speech and left side facial droop, pt has hx of guillain barre, code stroke called

## 2021-10-30 NOTE — H&P ADULT - NSHPPHYSICALEXAM_GEN_ALL_CORE
Constitutional: NAD    Neuro  * Mental Status:  GCS 15:  E(4), V(5), M(6).  Awake, alert, oriented to conversation. dysarthric  * Cranial Nerves: . PERRL, EOMI, R facial, no gaze deviation  * Motor: RUE 5/5, LUE 5/5, RLE 5/5, LLE 5/5  * Sensory: Sensation intact to light touch  * Reflexes: Not assessed  * Gait: Not assessed    Cardiovascular:  S1, S2 no murmurs appreciated.  Regular rate and rhythm.  Eyes: See neurologic examination with detailed examination of eyes.  ENT: No JVD, Trachea Midline.  Respiratory: Clear to auscultation.  Gastrointestinal: Soft, nontender, nondistended.  Genitourinary: [ ] Tapia, [ x ] No Tapia.   Musculoskeletal: No muscle wasting noted, (See neurologic assessment for full muscle strength assessment) No pretibial edema appreciated, no appreciable calf tenderness.  Skin:  Wound inspected, no redness, bleeding or drainage noted.    Hematologic / Lymph / Immunologic: No bleeding from IV sites or wounds, No lymphadenopathy, No Hives or allergic type skin lesions

## 2021-10-30 NOTE — ED PROVIDER NOTE - OBJECTIVE STATEMENT
81 yom pmh HTN, DM, guillian barre here with slurred speech and facial droop. Wife reports he was feeling unwell all day. Took a nap starting at 2p (wife reports he was last normal) woke up around 4p. around 430p wife noted patient with slurred speech and facial asymmetry prompting them to come to ED. not on blood thinners. no recent surgery

## 2021-10-30 NOTE — DISCHARGE NOTE NURSING/CASE MANAGEMENT/SOCIAL WORK - NSDCFUADDAPPT_GEN_ALL_CORE_FT
Star pt declines meds to bed and medication education.  Star appointment-Neuro MD-Dr Murtaza Mohan in [place] [phone] will call him for the appointment.   Yellow packet given  Star pt declines meds to bed and medication education.  Star appointment-Neuro MD-Dr Murtaza Mohan in [place] [phone] will call him for the appointment.   Yellow packet given     [facility] -pt wanted information about our Neuro group for future follow up.                                         [facility] at [place]                                  [address]                                      [address]                                           [place], [place], [address]                                                [phone]

## 2021-10-30 NOTE — ED PROVIDER NOTE - CLINICAL SUMMARY MEDICAL DECISION MAKING FREE TEXT BOX
81 yom pmh HTN, DM, guillian barre here with slurred speech and facial droop. Code stroke called. CT unremarkable, Telestroke consulted and advised TPA, TPA pushed without issue.  Patient being admitted to neuroICU for further management.

## 2021-10-30 NOTE — H&P ADULT - NSHPLABSRESULTS_GEN_ALL_CORE
13.2   7.55  )-----------( 168      ( 30 Oct 2021 17:47 )             38.1   10-30    134<L>  |  98  |  22.2<H>  ----------------------------<  217<H>  4.5   |  24.0  |  1.07    Ca    9.5      30 Oct 2021 17:47    TPro  6.8  /  Alb  4.1  /  TBili  0.3<L>  /  DBili  x   /  AST  15  /  ALT  10  /  AlkPhos  66  10-30    < from: CT Angio Head w/ IV Cont (10.30.21 @ 17:52) >    CT BRAIN: No acute infarction or hemorrhage. Findings discussed with Dr. La at 5:46 PM.  Consider MRI as clinically warranted.    CTP BRAIN: Negative CTP.    CTA BRAIN/NECK: No large vessel occlusion or high-grade stenosis.    --- End of Report ---          < end of copied text >

## 2021-10-30 NOTE — ED PROVIDER NOTE - PHYSICAL EXAMINATION
Vital Signs per nursing documentation  Gen: well appearing, no acute distress  HEENT: NCAT, MMM  Cardiac: regular rate rhythm, normal S1S2  Chest: clear to auscultation bilateral  Abdomen: soft, non tender non distended  Extremity: no gross deformity, good perfusion  Skin: no rash  Neuro: aox3, alert. +right facial droop, strength intact bilateral, ftn normal, subtle drift to right arm Vital Signs per nursing documentation  Gen: elderly appearing, no acute distress  HEENT: NCAT, Rt facial droop.  Cardiac: regular rate rhythm, normal S1S2  Chest: clear to auscultation bilateral  Abdomen: soft, non tender non distended  Extremity: no gross deformity, good perfusion  Skin: no rash  Neuro: aox3, alert. +right facial droop, strength intact bilateral, ftn normal, subtle drift to right arm, speech slighly garbled

## 2021-10-30 NOTE — ED PROVIDER NOTE - NSICDXPASTMEDICALHX_GEN_ALL_CORE_FT
PAST MEDICAL HISTORY:  Diabetes     Guillain Barré syndrome diagnosed oct 2015 treated at [facility]; under care of neurologist.    High cholesterol     Hypertension

## 2021-10-30 NOTE — DISCHARGE NOTE NURSING/CASE MANAGEMENT/SOCIAL WORK - PATIENT PORTAL LINK FT
You can access the FollowMyHealth Patient Portal offered by [facility] by registering at the following website: [email or web address]. By joining [facility]’s FollowMyHealth portal, you will also be able to view your health information using other applications (apps) compatible with our system.

## 2021-10-30 NOTE — ED PROVIDER NOTE - ATTENDING CONTRIBUTION TO CARE
81 yom pmh HTN, DM, guillian barre here with slurred speech and facial droop. LKW 14:00. NIH 3. Code stroke called. CT no acute bleed. Telestroke consulted Dr. Goodman and advised TPA. wife at bedside agreeable. SBP initially >180 and managed with hydralazine otherwise no absolute contraindications, TPA bolus at 18:13. will admit to NeuroICU

## 2021-10-30 NOTE — H&P ADULT - NSICDXPASTMEDICALHX_GEN_ALL_CORE_FT
PAST MEDICAL HISTORY:  Diabetes     Guillain Barré syndrome diagnosed oct 2015 treated at [facility]; under care of neurologist.    High cholesterol     Hypertension

## 2021-10-31 LAB
A1C WITH ESTIMATED AVERAGE GLUCOSE RESULT: 7.3 % — HIGH (ref 4–5.6)
ANION GAP SERPL CALC-SCNC: 11 MMOL/L — SIGNIFICANT CHANGE UP (ref 5–17)
BLD GP AB SCN SERPL QL: SIGNIFICANT CHANGE UP
BUN SERPL-MCNC: 16.8 MG/DL — SIGNIFICANT CHANGE UP (ref 8–20)
CALCIUM SERPL-MCNC: 8.9 MG/DL — SIGNIFICANT CHANGE UP (ref 8.6–10.2)
CHLORIDE SERPL-SCNC: 104 MMOL/L — SIGNIFICANT CHANGE UP (ref 98–107)
CHOLEST SERPL-MCNC: 173 MG/DL — SIGNIFICANT CHANGE UP
CO2 SERPL-SCNC: 24 MMOL/L — SIGNIFICANT CHANGE UP (ref 22–29)
COVID-19 SPIKE DOMAIN AB INTERP: POSITIVE
COVID-19 SPIKE DOMAIN ANTIBODY RESULT: >250 U/ML — HIGH
CREAT SERPL-MCNC: 0.99 MG/DL — SIGNIFICANT CHANGE UP (ref 0.5–1.3)
ESTIMATED AVERAGE GLUCOSE: 163 MG/DL — HIGH (ref 68–114)
GLUCOSE BLDC GLUCOMTR-MCNC: 127 MG/DL — HIGH (ref 70–99)
GLUCOSE BLDC GLUCOMTR-MCNC: 144 MG/DL — HIGH (ref 70–99)
GLUCOSE BLDC GLUCOMTR-MCNC: 170 MG/DL — HIGH (ref 70–99)
GLUCOSE BLDC GLUCOMTR-MCNC: 184 MG/DL — HIGH (ref 70–99)
GLUCOSE SERPL-MCNC: 121 MG/DL — HIGH (ref 70–99)
HCT VFR BLD CALC: 36.8 % — LOW (ref 39–50)
HDLC SERPL-MCNC: 53 MG/DL — SIGNIFICANT CHANGE UP
HGB BLD-MCNC: 12.8 G/DL — LOW (ref 13–17)
LIPID PNL WITH DIRECT LDL SERPL: 85 MG/DL — SIGNIFICANT CHANGE UP
MAGNESIUM SERPL-MCNC: 1.7 MG/DL — SIGNIFICANT CHANGE UP (ref 1.6–2.6)
MCHC RBC-ENTMCNC: 31.2 PG — SIGNIFICANT CHANGE UP (ref 27–34)
MCHC RBC-ENTMCNC: 34.8 GM/DL — SIGNIFICANT CHANGE UP (ref 32–36)
MCV RBC AUTO: 89.8 FL — SIGNIFICANT CHANGE UP (ref 80–100)
NON HDL CHOLESTEROL: 120 MG/DL — SIGNIFICANT CHANGE UP
PHOSPHATE SERPL-MCNC: 3.6 MG/DL — SIGNIFICANT CHANGE UP (ref 2.4–4.7)
PLATELET # BLD AUTO: 154 K/UL — SIGNIFICANT CHANGE UP (ref 150–400)
POTASSIUM SERPL-MCNC: 3.4 MMOL/L — LOW (ref 3.5–5.3)
POTASSIUM SERPL-SCNC: 3.4 MMOL/L — LOW (ref 3.5–5.3)
RBC # BLD: 4.1 M/UL — LOW (ref 4.2–5.8)
RBC # FLD: 12.1 % — SIGNIFICANT CHANGE UP (ref 10.3–14.5)
SARS-COV-2 IGG+IGM SERPL QL IA: >250 U/ML — HIGH
SARS-COV-2 IGG+IGM SERPL QL IA: POSITIVE
SODIUM SERPL-SCNC: 139 MMOL/L — SIGNIFICANT CHANGE UP (ref 135–145)
TRIGL SERPL-MCNC: 176 MG/DL — HIGH
TSH SERPL-MCNC: 1.78 UIU/ML — SIGNIFICANT CHANGE UP (ref 0.27–4.2)
WBC # BLD: 7.28 K/UL — SIGNIFICANT CHANGE UP (ref 3.8–10.5)
WBC # FLD AUTO: 7.28 K/UL — SIGNIFICANT CHANGE UP (ref 3.8–10.5)

## 2021-10-31 PROCEDURE — 70450 CT HEAD/BRAIN W/O DYE: CPT | Mod: 26

## 2021-10-31 PROCEDURE — 93306 TTE W/DOPPLER COMPLETE: CPT | Mod: 26

## 2021-10-31 PROCEDURE — 99291 CRITICAL CARE FIRST HOUR: CPT

## 2021-10-31 PROCEDURE — 93970 EXTREMITY STUDY: CPT | Mod: 26

## 2021-10-31 RX ORDER — LABETALOL HCL 100 MG
10 TABLET ORAL
Refills: 0 | Status: DISCONTINUED | OUTPATIENT
Start: 2021-10-31 | End: 2021-11-01

## 2021-10-31 RX ORDER — NICARDIPINE HYDROCHLORIDE 30 MG/1
5 CAPSULE, EXTENDED RELEASE ORAL
Qty: 40 | Refills: 0 | Status: DISCONTINUED | OUTPATIENT
Start: 2021-10-31 | End: 2021-11-01

## 2021-10-31 RX ORDER — CARVEDILOL PHOSPHATE 80 MG/1
3.12 CAPSULE, EXTENDED RELEASE ORAL EVERY 12 HOURS
Refills: 0 | Status: DISCONTINUED | OUTPATIENT
Start: 2021-10-31 | End: 2021-11-02

## 2021-10-31 RX ORDER — HYDRALAZINE HCL 50 MG
10 TABLET ORAL
Refills: 0 | Status: DISCONTINUED | OUTPATIENT
Start: 2021-10-31 | End: 2021-11-01

## 2021-10-31 RX ORDER — LISINOPRIL 2.5 MG/1
40 TABLET ORAL DAILY
Refills: 0 | Status: DISCONTINUED | OUTPATIENT
Start: 2021-10-31 | End: 2021-11-02

## 2021-10-31 RX ORDER — SIMVASTATIN 20 MG/1
10 TABLET, FILM COATED ORAL AT BEDTIME
Refills: 0 | Status: DISCONTINUED | OUTPATIENT
Start: 2021-10-31 | End: 2021-11-01

## 2021-10-31 RX ORDER — ASPIRIN/CALCIUM CARB/MAGNESIUM 324 MG
81 TABLET ORAL DAILY
Refills: 0 | Status: DISCONTINUED | OUTPATIENT
Start: 2021-10-31 | End: 2021-11-02

## 2021-10-31 RX ORDER — INSULIN GLARGINE 100 [IU]/ML
5 INJECTION, SOLUTION SUBCUTANEOUS AT BEDTIME
Refills: 0 | Status: DISCONTINUED | OUTPATIENT
Start: 2021-10-31 | End: 2021-11-02

## 2021-10-31 RX ORDER — INSULIN LISPRO 100/ML
VIAL (ML) SUBCUTANEOUS
Refills: 0 | Status: DISCONTINUED | OUTPATIENT
Start: 2021-10-31 | End: 2021-11-02

## 2021-10-31 RX ORDER — MAGNESIUM SULFATE 500 MG/ML
2 VIAL (ML) INJECTION ONCE
Refills: 0 | Status: COMPLETED | OUTPATIENT
Start: 2021-10-31 | End: 2021-10-31

## 2021-10-31 RX ORDER — AMLODIPINE BESYLATE 2.5 MG/1
5 TABLET ORAL DAILY
Refills: 0 | Status: DISCONTINUED | OUTPATIENT
Start: 2021-10-31 | End: 2021-11-02

## 2021-10-31 RX ORDER — ENOXAPARIN SODIUM 100 MG/ML
40 INJECTION SUBCUTANEOUS AT BEDTIME
Refills: 0 | Status: DISCONTINUED | OUTPATIENT
Start: 2021-11-01 | End: 2021-11-02

## 2021-10-31 RX ORDER — POTASSIUM CHLORIDE 20 MEQ
40 PACKET (EA) ORAL ONCE
Refills: 0 | Status: COMPLETED | OUTPATIENT
Start: 2021-10-31 | End: 2021-10-31

## 2021-10-31 RX ADMIN — SODIUM CHLORIDE 50 MILLILITER(S): 9 INJECTION INTRAMUSCULAR; INTRAVENOUS; SUBCUTANEOUS at 00:03

## 2021-10-31 RX ADMIN — Medication 5 MILLIGRAM(S): at 17:51

## 2021-10-31 RX ADMIN — NICARDIPINE HYDROCHLORIDE 25 MG/HR: 30 CAPSULE, EXTENDED RELEASE ORAL at 21:35

## 2021-10-31 RX ADMIN — Medication 2: at 12:41

## 2021-10-31 RX ADMIN — AMLODIPINE BESYLATE 5 MILLIGRAM(S): 2.5 TABLET ORAL at 16:49

## 2021-10-31 RX ADMIN — Medication 40 MILLIEQUIVALENT(S): at 08:27

## 2021-10-31 RX ADMIN — CARVEDILOL PHOSPHATE 3.12 MILLIGRAM(S): 80 CAPSULE, EXTENDED RELEASE ORAL at 17:51

## 2021-10-31 RX ADMIN — NICARDIPINE HYDROCHLORIDE 25 MG/HR: 30 CAPSULE, EXTENDED RELEASE ORAL at 17:51

## 2021-10-31 RX ADMIN — SIMVASTATIN 10 MILLIGRAM(S): 20 TABLET, FILM COATED ORAL at 20:24

## 2021-10-31 RX ADMIN — Medication 81 MILLIGRAM(S): at 20:24

## 2021-10-31 RX ADMIN — INSULIN GLARGINE 5 UNIT(S): 100 INJECTION, SOLUTION SUBCUTANEOUS at 21:35

## 2021-10-31 RX ADMIN — NICARDIPINE HYDROCHLORIDE 25 MG/HR: 30 CAPSULE, EXTENDED RELEASE ORAL at 00:03

## 2021-10-31 RX ADMIN — Medication 50 GRAM(S): at 08:27

## 2021-10-31 RX ADMIN — LISINOPRIL 40 MILLIGRAM(S): 2.5 TABLET ORAL at 10:44

## 2021-10-31 RX ADMIN — Medication 2: at 00:02

## 2021-10-31 RX ADMIN — CARVEDILOL PHOSPHATE 3.12 MILLIGRAM(S): 80 CAPSULE, EXTENDED RELEASE ORAL at 10:44

## 2021-10-31 NOTE — OCCUPATIONAL THERAPY INITIAL EVALUATION ADULT - ADDITIONAL COMMENTS
Pt lives in private home with no steps to enter.  Once inside there are 4 steps with HR to his bedroom.   Pt seldom drives.  He has a RW, SAC, no other medical equipment.

## 2021-10-31 NOTE — PHYSICAL THERAPY INITIAL EVALUATION ADULT - PERTINENT HX OF CURRENT PROBLEM, REHAB EVAL
81M with PMHX TIA 2006, Guillian Berry 2006 s/p flu vaccine (baseline gait imbalance per wife), HTN, DM now insulin dependant, cholecystectomy and hernia repair surgery x2 presents with complaints of right facial droop and dysarthria. Patient arrived to [facility] as CODE stroke. NIH on arrival was a 3. /84 on arrival. 10mg IVP hydralazine given. CTH negative for intracranial hemorrhage. CTP: no core infarct/ no penumbra. CTA: negative for LVO. TPA was administered at 1813.

## 2021-10-31 NOTE — PROGRESS NOTE ADULT - ASSESSMENT
81 M PMHX TIA 2006, Guillian [place]t 2006 s/p flu vaccine (baseline gait imbalance per wife), HTN, DM now insulin dependant, cholecystectomy and hernia repair surgery x2 presents with complaints of right side facial, dysarthria s/p TPA 1813. CTH: no intracranial heme. CTP: no core infarct/ perfusion mismatch. CTA: no LVO  LKW 2pm. NIH 3, MRS 0,   hx TIA, Guillian [place], DM, HTN    PLAN:  Neuro:  -Admit to NSICU  -Neuro checks q 2 hrs  -stroke core measures A1C, Lipid, TSH as above  - repeat CTH today at 6pm r/o heme conversion. if no evidence of hemorrhage on CT scan  initiate aspirin  -pending MRI  - PT/ OT / speech  -neurology consult for F/U    CV:  -SBP goal 120-180  -PRN: Labetalol/ hydralazine/ cardene gtt  -pending EKG  -TTE pending    Respiratory:  -RA  -PRN NC spo2 <92%  -PRN duonebs for bronchospasm/ wheezing    GI:  -Post dysphagia eval - guided diet  - stool softeners    :  -NS @ 50cc/hr  -Monitor NA- 135- 145 goal/  monitor Scr/ electrolytes- K, Phos, Mgt and Ca    ID:  -Monitor for leukocytosis and fever    Heme:  - lovenox for DVT chemoprophylaxsis post CT if no Heme  -VTE prophylaxis: SCDS    Endo:  -hx insulin dependant DM  -MISS      81 M PMHX TIA 2006, Guillian Saverton 2006 s/p flu vaccine (baseline gait imbalance per wife), HTN, DM now insulin dependant, cholecystectomy and hernia repair surgery x2 presents with complaints of right side facial, dysarthria s/p TPA 1813. CTH: no intracranial heme. CTP: no core infarct/ perfusion mismatch. CTA: no LVO  LKW 2pm. NIH 3, MRS 0,   hx TIA, Guillian Sa[place], DM, HTN    PLAN:  Neuro:  -Admit to NSICU  -Neuro checks q 2 hrs  - ASA if CT stable after 6 pm   -stroke core measures A1C, Lipid, TSH as above  - repeat CTH today at 6pm r/o heme conversion. if no evidence of hemorrhage on CT scan  initiate aspirin  -pending MRI  - PT/ OT / speech  -neurology consult for F/U    CV:  -SBP goal 120-160  -PRN: Labetalol/ hydralazine/ Wean  cardene gtt  - EKG  -TTE pending    Respiratory:  -RA  -PRN NC spo2 <92%  -PRN duonebs for bronchospasm/ wheezing    GI: DASH /CCD diet  -Post dysphagia eval - guided diet  - stool softeners    :  -D/C IVF   No renee  -Monitor NA- 135- 145 goal/  monitor Scr/ electrolytes- K, Phos, Mgt and Ca    ID:  -Monitor for leukocytosis and fever    Heme:  - lovenox for DVT chemoprophylaxsis post CT if no Heme  -VTE prophylaxis: SCDS    Endo: IR DM   Lantus 5 u qhs   -hx insulin dependant DM  -MISS

## 2021-10-31 NOTE — PHYSICAL THERAPY INITIAL EVALUATION ADULT - PHYSICAL ASSIST/NONPHYSICAL ASSIST: GAIT, REHAB EVAL
pt required verbal cues for proper and safe use of RW and to encourage standing rest breaks as needed. pt demonstrates minimal veering./1 person + 1 person to manage equipment

## 2021-10-31 NOTE — PHYSICAL THERAPY INITIAL EVALUATION ADULT - LEVEL OF INDEPENDENCE: SIT/STAND, REHAB EVAL
pt states feeling dizzy while sitting in chair during PT interview, pt states dizziness stayed the same once he performed sit to stand./contact guard

## 2021-10-31 NOTE — OCCUPATIONAL THERAPY INITIAL EVALUATION ADULT - BED MOBILITY/TRANSFERS, PREVIOUS LEVEL OF FUNCTION, OT EVAL
Discharge information provided. Pt verbalized understanding of discharge instructions to follow up with PCP and Ortho and to return to ER if condition worsens. Pt expressed the awareness of not driving or operating heavy machinery, has ride home with Family. Pt ambulated out of ER in a steady gait, no additional questions or concerns.    with SAC outdoors/independent

## 2021-10-31 NOTE — PHYSICAL THERAPY INITIAL EVALUATION ADULT - DISCHARGE DISPOSITION, PT EVAL
pending progress with PT and stair assessment. pt not safe for d/c at this time./home w/ assist/home w/ home PT

## 2021-10-31 NOTE — OCCUPATIONAL THERAPY INITIAL EVALUATION ADULT - NS ASR OT EQUIP NEEDS DISCH
least restrictive device pending progress.  Pt already has RW, SAC.  Grab bar in shower, shower seat recommended.

## 2021-10-31 NOTE — PHYSICAL THERAPY INITIAL EVALUATION ADULT - ADDITIONAL COMMENTS
pt reports he lives with his wife in a house with 0 steps to enter + 4 steps to bedroom with one handrail + 8 steps upstairs with one handrail. pt reports independence with all ADLs and use of SAC or RW in the community. pt states he does not drive often but when he does it is small distances. pt owns a cane and RW no other DME at home.

## 2021-10-31 NOTE — PHYSICAL THERAPY INITIAL EVALUATION ADULT - GENERAL OBSERVATIONS, REHAB EVAL
pt received OOB in chair + IV + monitor, A&OX4, NAD & willing to participate with PT. RN ok'ed pt to be seen with pending duplex for LE, states it is routine and pt has been OOB and amb to bathroom.

## 2021-11-01 DIAGNOSIS — Z02.9 ENCOUNTER FOR ADMINISTRATIVE EXAMINATIONS, UNSPECIFIED: ICD-10-CM

## 2021-11-01 DIAGNOSIS — I63.9 CEREBRAL INFARCTION, UNSPECIFIED: ICD-10-CM

## 2021-11-01 DIAGNOSIS — I10 ESSENTIAL (PRIMARY) HYPERTENSION: ICD-10-CM

## 2021-11-01 DIAGNOSIS — E11.9 TYPE 2 DIABETES MELLITUS WITHOUT COMPLICATIONS: ICD-10-CM

## 2021-11-01 LAB
ANION GAP SERPL CALC-SCNC: 12 MMOL/L — SIGNIFICANT CHANGE UP (ref 5–17)
BUN SERPL-MCNC: 14.5 MG/DL — SIGNIFICANT CHANGE UP (ref 8–20)
CALCIUM SERPL-MCNC: 9.3 MG/DL — SIGNIFICANT CHANGE UP (ref 8.6–10.2)
CHLORIDE SERPL-SCNC: 103 MMOL/L — SIGNIFICANT CHANGE UP (ref 98–107)
CO2 SERPL-SCNC: 24 MMOL/L — SIGNIFICANT CHANGE UP (ref 22–29)
CREAT SERPL-MCNC: 0.98 MG/DL — SIGNIFICANT CHANGE UP (ref 0.5–1.3)
GLUCOSE BLDC GLUCOMTR-MCNC: 133 MG/DL — HIGH (ref 70–99)
GLUCOSE BLDC GLUCOMTR-MCNC: 151 MG/DL — HIGH (ref 70–99)
GLUCOSE BLDC GLUCOMTR-MCNC: 192 MG/DL — HIGH (ref 70–99)
GLUCOSE SERPL-MCNC: 145 MG/DL — HIGH (ref 70–99)
MAGNESIUM SERPL-MCNC: 1.9 MG/DL — SIGNIFICANT CHANGE UP (ref 1.6–2.6)
PHOSPHATE SERPL-MCNC: 3.5 MG/DL — SIGNIFICANT CHANGE UP (ref 2.4–4.7)
POTASSIUM SERPL-MCNC: 4.1 MMOL/L — SIGNIFICANT CHANGE UP (ref 3.5–5.3)
POTASSIUM SERPL-SCNC: 4.1 MMOL/L — SIGNIFICANT CHANGE UP (ref 3.5–5.3)
SODIUM SERPL-SCNC: 138 MMOL/L — SIGNIFICANT CHANGE UP (ref 135–145)

## 2021-11-01 PROCEDURE — 99223 1ST HOSP IP/OBS HIGH 75: CPT

## 2021-11-01 PROCEDURE — 99233 SBSQ HOSP IP/OBS HIGH 50: CPT

## 2021-11-01 PROCEDURE — 70551 MRI BRAIN STEM W/O DYE: CPT | Mod: 26

## 2021-11-01 RX ORDER — HYDRALAZINE HCL 50 MG
10 TABLET ORAL
Refills: 0 | Status: DISCONTINUED | OUTPATIENT
Start: 2021-11-01 | End: 2021-11-02

## 2021-11-01 RX ORDER — MAGNESIUM SULFATE 500 MG/ML
2 VIAL (ML) INJECTION ONCE
Refills: 0 | Status: COMPLETED | OUTPATIENT
Start: 2021-11-01 | End: 2021-11-01

## 2021-11-01 RX ORDER — EZETIMIBE 10 MG/1
1 TABLET ORAL
Qty: 0 | Refills: 0 | DISCHARGE

## 2021-11-01 RX ORDER — ATORVASTATIN CALCIUM 80 MG/1
40 TABLET, FILM COATED ORAL AT BEDTIME
Refills: 0 | Status: DISCONTINUED | OUTPATIENT
Start: 2021-11-01 | End: 2021-11-02

## 2021-11-01 RX ORDER — TOPIRAMATE 25 MG
1 TABLET ORAL
Qty: 0 | Refills: 0 | DISCHARGE

## 2021-11-01 RX ORDER — LEVOTHYROXINE SODIUM 125 MCG
125 TABLET ORAL DAILY
Refills: 0 | Status: DISCONTINUED | OUTPATIENT
Start: 2021-11-01 | End: 2021-11-02

## 2021-11-01 RX ORDER — ALPRAZOLAM 0.25 MG
0.5 TABLET ORAL ONCE
Refills: 0 | Status: DISCONTINUED | OUTPATIENT
Start: 2021-11-01 | End: 2021-11-01

## 2021-11-01 RX ORDER — LABETALOL HCL 100 MG
10 TABLET ORAL
Refills: 0 | Status: DISCONTINUED | OUTPATIENT
Start: 2021-11-01 | End: 2021-11-01

## 2021-11-01 RX ADMIN — ATORVASTATIN CALCIUM 40 MILLIGRAM(S): 80 TABLET, FILM COATED ORAL at 22:07

## 2021-11-01 RX ADMIN — Medication 2: at 07:49

## 2021-11-01 RX ADMIN — Medication 50 GRAM(S): at 07:51

## 2021-11-01 RX ADMIN — CARVEDILOL PHOSPHATE 3.12 MILLIGRAM(S): 80 CAPSULE, EXTENDED RELEASE ORAL at 05:04

## 2021-11-01 RX ADMIN — Medication 2: at 16:58

## 2021-11-01 RX ADMIN — Medication 0.5 MILLIGRAM(S): at 13:03

## 2021-11-01 RX ADMIN — AMLODIPINE BESYLATE 5 MILLIGRAM(S): 2.5 TABLET ORAL at 05:04

## 2021-11-01 RX ADMIN — Medication 81 MILLIGRAM(S): at 12:14

## 2021-11-01 RX ADMIN — ENOXAPARIN SODIUM 40 MILLIGRAM(S): 100 INJECTION SUBCUTANEOUS at 22:07

## 2021-11-01 RX ADMIN — LISINOPRIL 40 MILLIGRAM(S): 2.5 TABLET ORAL at 05:04

## 2021-11-01 RX ADMIN — INSULIN GLARGINE 5 UNIT(S): 100 INJECTION, SOLUTION SUBCUTANEOUS at 22:07

## 2021-11-01 RX ADMIN — Medication 10 MILLIGRAM(S): at 23:08

## 2021-11-01 RX ADMIN — Medication 10 MILLIGRAM(S): at 21:03

## 2021-11-01 RX ADMIN — Medication 1 TABLET(S): at 12:14

## 2021-11-01 RX ADMIN — CARVEDILOL PHOSPHATE 3.12 MILLIGRAM(S): 80 CAPSULE, EXTENDED RELEASE ORAL at 17:32

## 2021-11-01 NOTE — CONSULT NOTE ADULT - SUBJECTIVE AND OBJECTIVE BOX
[facility]                                        Neurology at [place]                                  River Carrero & Kenneth                                      [address]                                           [place], [place], [address]                                                [phone]        CC: Stroke.    HISTORY:  The patient is a 81y Male who presented 10/30/21 with speech difficulty and right sided facial drooping.   He was brought to the ER where the stroke code was activated.   The last known well time was noted to be 2 pm the day of arrival.   He received t-PA and was admitted to the ICU.   Repeat CT head was negative on 10/31.  He is being downgraded to the medical service and Neurology was called.     PAST MEDICAL & SURGICAL HISTORY:  Hypertension  Guillain Barré syndrome  diagnosed oct 2015 treated at [facility]; under care of neurologist.  Diabetes  High cholesterol  H/O bilateral inguinal hernia repair  History of cholecystectomy  Lumbosacral spinal stenosis  h.o cyst resection, decompression 12/2015    MEDICATION PRIOR TO ADMISSION:  · 	sucralfate 1 g/10 mL oral suspension: 10 milliliter(s) orally 4 times a day  · 	aspirin 81 mg oral tablet: 1 tab(s) orally once a day  · 	pantoprazole 40 mg oral delayed release tablet: 1 tab(s) orally every 12 hours  · 	ferrous sulfate 325 mg (65 mg elemental iron) oral delayed release tablet  · 	carvedilol 3.125 mg oral tablet: 1 tab(s) orally 2 times a day  · 	amLODIPine 5 mg oral tablet: 1 tab(s) orally once a day  · 	Avodart 0.5 mg oral capsule: 1 cap(s) orally once a day  · 	baclofen 10 mg oral tablet: 1 tab(s) orally 3 times a day, As Needed  · 	ezetimibe 10 mg oral tablet: 1 tab(s) orally once a day  · 	Antivert 12.5 mg oral tablet: 1 tab(s) orally 3 times a day, As Needed  · 	quinapril 40 mg oral tablet: 1 tab(s) orally once a day  · 	Topamax 25 mg oral tablet: 1 tab(s) orally 2 times a day, As Needed  · 	metFORMIN 1000 mg oral tablet:  orally once a day  · 	alfuzosin 10 mg oral tablet, extended release: 1 tab(s) orally once a day    MEDICATIONS  (STANDING):  ALPRAZolam 0.5 milliGRAM(s) Oral once  amLODIPine   Tablet 5 milliGRAM(s) Oral daily  aspirin enteric coated 81 milliGRAM(s) Oral daily  atorvastatin 40 milliGRAM(s) Oral at bedtime  carvedilol 3.125 milliGRAM(s) Oral every 12 hours  dextrose 40% Gel 15 Gram(s) Oral once  dextrose 5%. 1000 milliLiter(s) (50 mL/Hr) IV Continuous <Continuous>  enoxaparin Injectable 40 milliGRAM(s) SubCutaneous at bedtime  glucagon  Injectable 1 milliGRAM(s) IntraMuscular once  insulin glargine Injectable (LANTUS) 5 Unit(s) SubCutaneous at bedtime  insulin lispro (ADMELOG) corrective regimen sliding scale   SubCutaneous two times a day before meals  lisinopril 40 milliGRAM(s) Oral daily  multivitamin 1 Tablet(s) Oral daily    MEDICATIONS  (PRN):  hydrALAZINE Injectable 10 milliGRAM(s) IV Push every 2 hours PRN SBP>150    Allergies  No Known Allergies    SOCIAL HISTORY:  Non smoker.     FAMILY HISTORY:  Family history of cancer  No known family history of stroke.     ROS:  Constitutional: The patient denies fevers or weight changes.  Neuro: As per HPI.  Eyes: Denies blurry vision.  Ears/nose/throat: Denies Tinnitus.   Cardiac: Denies chest pain. Denies palpitations.  Respiratory: Denies shortness of breath.  GI: Denies abdominal pain, nausea, or vomiting.  : Denies change in urinary pattern.  Integumentary: Denies rash.  Psych: Denies recent mood changes.  Heme: denies easy bleeding/bruising.    Exam:  Vital Signs Last 24 Hrs  T(C): 36.8 (01 Nov 2021 07:30), Max: 36.9 (01 Nov 2021 03:00)  T(F): 98.2 (01 Nov 2021 07:30), Max: 98.4 (01 Nov 2021 03:00)  HR: 69 (01 Nov 2021 10:00) (53 - 84)  BP: 138/67 (01 Nov 2021 10:00) (134/72 - 192/75)  BP(mean): 88 (01 Nov 2021 10:00) (79 - 106)  RR: 16 (01 Nov 2021 10:00) (12 - 23)  SpO2: 96% (01 Nov 2021 10:00) (94% - 98%)  General: NAD.   Carotid bruits absent.     Mental status: The patient is awake, alert, and fully oriented. There is no aphasia. Attention span is normal. Patient is aware of current events.     Cranial nerves: There is no papilledema. Pupils react symmetrically to light. There is no visual field deficit to confrontation. Extraocular motion is full with no nystagmus.  Facial sensation is intact. Facial musculature is symmetric. Palate elevates symmetrically. Tongue is midline.    Motor: There is normal bulk and tone.  Strength is 5/5 in the right arm and leg.   Strength is 5/5 in the left arm and leg.    Sensation: Intact to light touch and pin. There is no extinction to double simultaneous stimulation.    Reflexes: 2+ throughout and plantar responses are flexor.    Cerebellar: There is no dysmetria on finger to nose testing.    [facility] SS:   Date: 11/1/21  Time:   1a) Level of consciousness (0-3): 0  1b) Questions (0-2): 0  1c) Commands (0-2): 0  2  ) Gaze (0-2): 0  3  ) Visual field (0-3): 0  4  ) Facial palsy (0-3): 0  Motor  5a) Left arm (0-4): 0  5b) Right arm (0-4): 0  6a) Left leg (0-4): 0  6b) Right leg (0-4): 0  7  ) Ataxia (0-2): 0  Sensory  8  ) Sensory (0-2): 0  Speech  9  ) Language (0-3): 0  10) Dysarthria (0-2): 0  Extinction  11) Extinction/inattention (0-2): 0    Total score: 0    Prestroke Modified [place]: 0    (0: No symptoms and no disability.  1: No significant disability despite symptoms; able to carry out all usual duties and activities.  2: Slight disability; unable to carry out all previous activity but able to look after own affairs without assistance.  3: Moderate disability; requiring some help but able to walk without assistance.   4: Moderately severe disability; unable to walk without assistance and unable to attend to own bodily needs without assistance.  5: Severe disability; bedridden, incontinent and requiring constant nursing care and attention.   6: Dead. )     LABS:                         12.8   7.28  )-----------( 154      ( 31 Oct 2021 05:26 )             36.8       11-01    138  |  103  |  14.5  ----------------------------<  145<H>  4.1   |  24.0  |  0.98    Ca    9.3      01 Nov 2021 05:31  Phos  3.5     11-01  Mg     1.9     11-01    TPro  6.8  /  Alb  4.1  /  TBili  0.3<L>  /  DBili  x   /  AST  15  /  ALT  10  /  AlkPhos  66  10-30      PT/INR - ( 30 Oct 2021 17:47 )   PT: 11.8 sec;   INR: 1.02 ratio    PTT - ( 30 Oct 2021 17:47 )  PTT:26.8 sec    RADIOLOGY   CT head images reviewed (and concur with report): There is no acute pathology. There is chronic ischemic change.         
81yM was admitted on 10-30 with right sided lip droop and dysarthria. NIHSS=3. He is s/p tPA.    Imaging performed:  CT BRAIN 10/30 - No acute infarction or hemorrhage. Findings discussed with Dr. La at 5:46 PM.  Consider MRI as clinically warranted.    CTP BRAIN 10/30 - Negative CTP.    CTA BRAIN/NECK  10/30 -  No large vessel occlusion or high-grade stenosis.    CT HEAD 10/31 - Mild chronic microvascular changes without evidence of an acute transcortical infarction or hemorrhage.    Patient feels fatigued, like when he had GBS 6 years ago from flu shot.  Reports difficulty speaking.     REVIEW OF SYSTEMS  Constitutional - No fever, No weight loss, +fatigue  HEENT - No eye pain, No visual disturbances, No difficulty hearing, No tinnitus, No vertigo, No neck pain  Respiratory - No cough, No wheezing, No shortness of breath  Cardiovascular - No chest pain, No palpitations  Gastrointestinal - No abdominal pain, No nausea, No vomiting, No diarrhea, No constipation  Genitourinary - No dysuria, No frequency, No hematuria, No incontinence  Neurological - No headaches, No memory loss, +loss of strength, No numbness, No tremors  Skin - No itching, No rashes, No lesions   Endocrine - No temperature intolerance  Musculoskeletal - No joint pain, No joint swelling, No muscle pain  Psychiatric - No depression, +anxiety    VITALS  T(C): 36.8 (11-01-21 @ 07:30), Max: 36.9 (11-01-21 @ 03:00)  HR: 69 (11-01-21 @ 10:00) (53 - 84)  BP: 138/67 (11-01-21 @ 10:00) (134/72 - 192/75)  RR: 16 (11-01-21 @ 10:00) (12 - 23)  SpO2: 96% (11-01-21 @ 10:00) (94% - 98%)  Wt(kg): --    PAST MEDICAL & SURGICAL HISTORY  Hypertension    Guillain Barré syndrome    Diabetes    High cholesterol    H/O bilateral inguinal hernia repair    History of cholecystectomy    Lumbosacral spinal stenosis        FUNCTIONAL HISTORY  Lives with spouse, 0 AKILA  Independent with [facility] for outdorr use    CURRENT FUNCTIONAL STATUS  10/31  Transfer: Sit to Stand:     · Level of [place]	contact guard; pt states feeling dizzy while sitting in chair during PT interview, pt states dizziness stayed the same once he performed sit to stand.  · Physical Assist/Nonphysical Assist	1 person assist  · Weight-Bearing Restrictions	full weight-bearing  · Assistive Device	rolling walker    Transfer: Stand to Sit:     · Level of [place]	contact guard  · Physical Assist/Nonphysical Assist	1 person assist  · Weight-Bearing Restrictions	full weight-bearing  · Assistive Device	rolling walker    Sit/Stand Transfer Safety Analysis:     · Transfer Safety Concerns Noted	decreased balance during turns; decreased step length; decreased weight-shifting ability  · Impairments Contributing to Impaired Transfers	impaired balance; decreased strength    Gait Skills:     · Level of [place]	contact guard; minimum assist (75% patients effort)  · Physical Assist/Nonphysical Assist	1 person + 1 person to manage equipment; pt required verbal cues for proper and safe use of RW and to encourage standing rest breaks as needed. pt demonstrates minimal veering.  · Weight-Bearing Restrictions	full weight-bearing  · Assistive Device	rolling walker  · Gait Distance	75 feet; pt reports feeling very fatigued after gait training. BP taken post /75    Gait Analysis:     · Gait Pattern Used	3-point gait  · Gait Deviations Noted	decreased jesús; decreased step length; decreased stride length  · Impairments Contributing to Gait Deviations	impaired balance; decreased strength; decreased endurance    Stair Negotiation:     · Level of [place]	TBA - unsafe at this time    10/31 OT  Upper Body Dressing Training:     · Level of [place]	supervision  · Physical Assist/Nonphysical Assist	set-up required    Lower Body Dressing Training:     · Level of [place]	moderate assist (50% patients effort)    Toilet Hygiene Training:     · Level of [place]	minimum assist (75% patients effort)    Grooming Training:     · Level of [place]	supervision  · Physical Assist/Nonphysical Assist	set-up required    Eating/Self-Feeding Training:     · Level of [place]	independent      SOCIAL HISTORY - as per documentation/history  Smoking - None  EtOH - None  Drugs - None    FAMILY HISTORY   Family history of cancer        RECENT LABS - Reviewed  CBC Full  -  ( 31 Oct 2021 05:26 )  WBC Count : 7.28 K/uL  RBC Count : 4.10 M/uL  Hemoglobin : 12.8 g/dL  Hematocrit : 36.8 %  Platelet Count - Automated : 154 K/uL  Mean Cell Volume : 89.8 fl  Mean Cell Hemoglobin : 31.2 pg  Mean Cell Hemoglobin Concentration : 34.8 gm/dL  Auto Neutrophil # : x  Auto Lymphocyte # : x  Auto Monocyte # : x  Auto Eosinophil # : x  Auto Basophil # : x  Auto Neutrophil % : x  Auto Lymphocyte % : x  Auto Monocyte % : x  Auto Eosinophil % : x  Auto Basophil % : x    11-01    138  |  103  |  14.5  ----------------------------<  145<H>  4.1   |  24.0  |  0.98    Ca    9.3      01 Nov 2021 05:31  Phos  3.5     11-01  Mg     1.9     11-01    TPro  6.8  /  Alb  4.1  /  TBili  0.3<L>  /  DBili  x   /  AST  15  /  ALT  10  /  AlkPhos  66  10-30        ALLERGIES  No Known Allergies      MEDICATIONS   ALPRAZolam 0.5 milliGRAM(s) Oral once  amLODIPine   Tablet 5 milliGRAM(s) Oral daily  aspirin enteric coated 81 milliGRAM(s) Oral daily  carvedilol 3.125 milliGRAM(s) Oral every 12 hours  dextrose 40% Gel 15 Gram(s) Oral once  dextrose 5%. 1000 milliLiter(s) IV Continuous <Continuous>  dextrose 5%. 1000 milliLiter(s) IV Continuous <Continuous>  dextrose 50% Injectable 25 Gram(s) IV Push once  dextrose 50% Injectable 12.5 Gram(s) IV Push once  dextrose 50% Injectable 25 Gram(s) IV Push once  enoxaparin Injectable 40 milliGRAM(s) SubCutaneous at bedtime  glucagon  Injectable 1 milliGRAM(s) IntraMuscular once  hydrALAZINE Injectable 10 milliGRAM(s) IV Push every 2 hours PRN  insulin glargine Injectable (LANTUS) 5 Unit(s) SubCutaneous at bedtime  insulin lispro (ADMELOG) corrective regimen sliding scale   SubCutaneous two times a day before meals  labetalol Injectable 10 milliGRAM(s) IV Push every 2 hours PRN  lisinopril 40 milliGRAM(s) Oral daily  multivitamin 1 Tablet(s) Oral daily  simvastatin 10 milliGRAM(s) Oral at bedtime      ----------------------------------------------------------------------------------------  PHYSICAL EXAM  Constitutional - NAD, Comfortable  HEENT - NCAT, EOMI  Neck - Supple, No limited ROM  Chest - Breathing comfortably, No wheezing  Cardiovascular - S1S2   Abdomen - Sof, Obese   Extremities - No C/C/E, No calf tenderness   Neurologic Exam -                    Cognitive - AAO to self, place, date, year, situation     Communication - Fluent, +dysarthria     Cranial Nerves - Right lip droop     Motor - No focal deficits                    LEFT    UE - ShAB 5/5, EF 5/5, EE 5/5, WE 5/5,  5/5                    RIGHT UE - ShAB 5/5, EF 5/5, EE 5/5, WE 5/5,  5/5                    LEFT    LE - HF 5/5, KE 5/5, DF 5/5, PF 5/5                    RIGHT LE - HF 5/5, KE 5/5, DF 5/5, PF 5/5        Sensory - Intact to LT  Psychiatric - Mood anxious, Affect WNL  ----------------------------------------------------------------------------------------  ASSESSMENT/PLAN  81yMale with functional deficits after having dysarthria  R/O CVA - ASA, Lipitor, MRI planned  DM2 - Lantus  HTN - Hydralazine, Norvasc, Coreg, Lisinopril   Pain - Tylenol  DVT PPX - SCDs, Lovenox  Rehab - Workup pending MRI and medically being optimized. Based on PT and OT functional progress, expect patient to achieve functional goals for DC HOME.    Recommend ongoing mobilization by staff to maintain cardiopulmonary function and prevention of secondary complications related to debility. Discussed with rehab team. 
Neurology consult    KETAN MARKHAM 81y Male     Patient is a 81y old  Male brought in by wife who saw him last normal at 2 PM when he went to bed. AT 4 PM when he woke up after a nap she observed that his speech was slightly slurred. He was worse at 4:30 PM with a right facial droop and much more slurred speech so he was [facility] to [facility].          MEDICATIONS    sodium chloride 0.9%. 50 milliLiter(s) IV Continuous <Continuous>      PMH: Hypertension    High cholesterol         PSH: H/O bilateral inguinal hernia repair    History of cholecystectomy    Lumbosacral spinal stenosis          Height (cm): 182.9 (10-30 @ 17:31)  Weight (kg): 109.8 (10-30 @ 17:41)  BMI (kg/m2): 32.8 (10-30 @ 17:41)    Vital Signs Last 24 Hrs  T(C): 36.7 (30 Oct 2021 18:15), Max: 36.8 (30 Oct 2021 18:10)  T(F): 98 (30 Oct 2021 18:15), Max: 98.2 (30 Oct 2021 18:10)  HR: 65 (30 Oct 2021 18:15) (64 - 66)  BP: 159/68 (30 Oct 2021 18:15) (159/68 - 199/84)  BP(mean): --  RR: 18 (30 Oct 2021 18:15) (16 - 20)  SpO2: 100% (30 Oct 2021 18:15) (98% - 100%)      On Neurological Examination:  TELE STROKE EXAM.    Mental Status - Patient is alert, awake, oriented X3 has mild dysarthria no aphasia Follows commands well and able to answer questions appropriately. Mood and affect  normal    Cranial Nerves - PERRL, EOMI, VFF, V1-V3 intact, He has a mild right facial weakness.  Motor Exam -   Right upper-slight drift  Left upper-5/5  Right lower-5/5  Left lower -5/5    Sensory    Intact to light touch bilaterally. No extinction to DSS.    Coord: FTN intact bilaterally     Gait -  Not tested.    NIHSS = 3                                                      LABS:  CBC Full  -  ( 30 Oct 2021 17:47 )  WBC Count : 7.55 K/uL  RBC Count : 4.22 M/uL  Hemoglobin : 13.2 g/dL  Hematocrit : 38.1 %  Platelet Count - Automated : 168 K/uL  Mean Cell Volume : 90.3 fl  Mean Cell Hemoglobin : 31.3 pg  Mean Cell Hemoglobin Concentration : 34.6 gm/dL  Auto Neutrophil # : 4.13 K/uL  Auto Lymphocyte # : 2.56 K/uL  Auto Monocyte # : 0.61 K/uL  Auto Eosinophil # : 0.18 K/uL  Auto Basophil # : 0.05 K/uL  Auto Neutrophil % : 54.6 %  Auto Lymphocyte % : 33.9 %  Auto Monocyte % : 8.1 %  Auto Eosinophil % : 2.4 %  Auto Basophil % : 0.7 %                          13.2   7.55  )-----------( 168      ( 30 Oct 2021 17:47 )             38.1     10-30    134<L>  |  98  |  22.2<H>  ----------------------------<  217<H>  4.5   |  24.0  |  1.07    Ca    9.5      30 Oct 2021 17:47    TPro  6.8  /  Alb  4.1  /  TBili  0.3<L>  /  DBili  x   /  AST  15  /  ALT  10  /  AlkPhos  66  10-30    CARDIAC MARKERS ( 30 Oct 2021 17:47 )  x     / <0.01 ng/mL / x     / x     / x          PT/INR - ( 30 Oct 2021 17:47 )   PT: 11.8 sec;   INR: 1.02 ratio         PTT - ( 30 Oct 2021 17:47 )  PTT:26.8 sec    I&O's Summary      RADIOLOGY & ADDITIONAL STUDIES:        CTH           INTERPRETATION:  CLINICAL INDICATIONS:Stroke Code    TECHNIQUE: CTA brain and neck. 90 cc Omnipaque 350 Intravenous contrast was administered. 2-D MIP and 3-D volume rendering images. CT perfusion: After the administration of 50 cc [id number] serial thin sections were obtained through the brain the purposes of evaluating CT perfusion. Raw data was sent to the ischemia rapid view software for postprocessing. RAPID artificial intelligence was utilized for the preliminary evaluation of intracranial hemorrhage and large vessel intracranial occlusion.    COMPARISON: CT head dated 1/14//2020. MRI brain dated 5/23/2016    FINDINGS:    NONCONTRAST CT HEAD:  There is periventricular and subcortical white matter hypodensity without mass effect, nonspecific, likely representing mild chronic microvascular ischemic changes. There is no compelling evidence for an acute transcortical infarction. There is no evidence of mass, mass effect, midline shift or extra-axial fluid collection. The lateral ventricles and cortical sulci are age-appropriate in size and configuration. The patient is status post bilateral ocular lens replacement surgery. The orbits, mastoid air cells and visualized paranasal sinuses are unremarkable. The calvarium is intact. Consider MRI as clinically warranted.    CTA BRAIN: Moderate calcific plaque in bilateral cavernous carotid arteries  The [place] and vertebrobasilar system are otherwise unremarkable without evidence of stenosis, occlusion or saccular aneurysm dilation. No evidence for arterial venous malformation. The vertebral arteries are codominant.      CTA NECK: Mild deviation the right internal carotid arteries level of the oropharynx on image 228 of series 3. Moderate calcific plaque in bilateral carotid bulbs without significant luminal narrowing.  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 There is normal relationship to the great vessels. The common carotid arteries, internal carotid arteries and vertebral arteries are otherwise normal in caliber. No evidence of stenosis, occlusion or saccular aneurysm dilation. The vertebral arteries are codominant.    CT PERFUSION:  Patient has only had less than 2 hours of symptoms may influence the CT perfusion.    No core infarct or evidenceof delayed mean transit time is identified.    CBF<30% volume: 0 ml  Tmax>6.0 s volume: 0 ml  Mismatch volume: 0 ml  Mismatch ratio: none          IMPRESSION:      CT BRAIN: No acute infarction or hemorrhage. Findings discussed with Dr. La at 5:46 PM.  Consider MRI as clinically warranted.    CTP BRAIN: Negative CTP.    CTA BRAIN/NECK: No large vessel occlusion or high-grade stenosis.    --- End of Report ---        IMPRESSION:    R/O left hemispheric subcortical infarct.    Etiology likely small vessel disease.  Plan/Rec:  Patient is within the 4.5 hrs window for IV tPA.  All risks ( including but not limited to intracranial bleed and worsening of neurological status and death) and benefits as well as alternatives were discussed with the patient and his wife. The patient and his wife consented for iv tPA.    - Admit to INSCU  Posy IV tPA protocol for INeuro checks, and Vitals  SBP goal keep under 180/105    CT head / MRI at 24 hours post tPA.  No anti platelets or anticoagulation for 24 hrs.  Telemetry monitoring.  TTE with bubble.  PT OT SLP evaluation  DVT ppx  Neurology stroke service to follow.

## 2021-11-01 NOTE — PROGRESS NOTE ADULT - ASSESSMENT
81 M PMHX TIA 2006, Guillian [place] 2006 s/p flu vaccine (baseline gait imbalance per wife), HTN, DM now insulin dependant, cholecystectomy and hernia repair surgery x2 presents with complaints of right side facial, dysarthria s/p TPA 1813.

## 2021-11-01 NOTE — PHARMACOTHERAPY INTERVENTION NOTE - COMMENTS
Called patient's pharmacies ([facility] and [facility] pharmacy) and spoke with patient at bedside for home medication list. Patient reports was recently switched from metformin to januvia and tresiba injection.

## 2021-11-01 NOTE — SPEECH LANGUAGE PATHOLOGY EVALUATION - SLP PERTINENT HISTORY OF CURRENT PROBLEM
As per MD note, "81 M PMHX TIA 2006, Guillian [place] 2006 s/p flu vaccine (baseline gait imbalance per wife), HTN, DM now insulin dependant, cholecystectomy and hernia repair surgery x2 presents with complaints of right side facial, dysarthria s/p TPA 1813. CTH: no intracranial heme. CTP: no core infarct/ perfusion mismatch. CTA: no LVO".

## 2021-11-01 NOTE — DIETITIAN INITIAL EVALUATION ADULT. - PERTINENT LABORATORY DATA
11-01 Na138 mmol/L Glu 145 mg/dL<H> K+ 4.1 mmol/L Cr  0.98 mg/dL BUN 14.5 mg/dL Phos 3.5 mg/dL Alb n/a   PAB n/a

## 2021-11-01 NOTE — CHART NOTE - NSCHARTNOTEFT_GEN_A_CORE
HPI: 81M with PMHX TIA 2006, Guillian [place] 2006 s/p flu vaccine (baseline gait imbalance per wife), HTN, DM now insulin dependant, cholecystectomy and hernia repair surgery x2 presents with complaints of right facial droop and dysarthria. As per wife, patient had felt off and more tired than per usual. Patient's wife states that his Last known well was around 2 pm when he went for a nap.  She noticed around 4pm after he had woke up, he had slight right facial and mild dysarthria. She stated while she was on the phone with her daughter-in-law, the facial droop became more profound and his dysarthria worsened within 10 minutes. Patient arrived to [facility] as CODE stroke. NIH on arrival was a 3. /84 on arrival. 10mg IVP hydralazine given. CTH negative for intracranial hemorrhage. CTP: no core infarct/ no penumbra. CTA: negative for LVO. TPA was administered at 1813. Patient admitted to [facility]    Hospital Course:   10/30: arrived as code stroke, NIH 3, received TPA at 1813  10/31: CT head negative for hemorrhage, restarted home meds, off cardene   11/1: stable for downgrade to floor, echo done EF 70-75%, Gr 1 DD    Physical Exam    Constitutional: No Acute Distress     Neurological: Awake, alert oriented to person, place and time, Following Commands, PERRL, EOMI, No Gaze Preference,  R Face asymmetry,  Mild dysarhria, No dysmetria, No ataxia, No nystagmus     Motor exam:          Upper extremity                         Delt     Bicep     Tricep    HG                                                 R         5/5        5/5        5/5       5/5                                               L          5/5        5/5        5/5       5/5          Lower extremity                        HF         KF        KE       DF         PF                                                  R        5/5        5/5        5/5       5/5         5/5                                               L         5/5        5/5       5/5       5/5          5/5                                                 Sensation: [X intact to light touch       Pulmonary: Clear to Auscultation, No rales, No rhonchi, No wheezes     Cardiovascular: S1, S2, Regular rate and rhythm     Gastrointestinal: Soft, Non-tender, Non-distended     Extremities: No calf tenderness     Plan  Neuro: q4 neuro checks, pending MRI brain, Dr. Keita consulted for neurology f/u   Cards: -160, LDL 85 on Coreg 3.125 BID, Lisinopril 40, Simvastatin 10, Amlodipine 5. Echo performed and read   Resp: on room air   GI: DASH / CCD   : voiding   Heme: ASA 81mg, Lovenox 40 qd, SCDs for DVT ppx   Endo: A1C 7.3, TSH 1.78. On Lantus 5 ,MISS FS controlled   ID: Afebrile, no issues   PT/OT following     Signed out to Dr. Napier from medicine accepted to his service. Neurology consulted. Downgrade to tele bed HPI: 81M with PMHX TIA 2006, Guillian [place] 2006 s/p flu vaccine (baseline gait imbalance per wife), HTN, DM now insulin dependant, cholecystectomy and hernia repair surgery x2 presents with complaints of right facial droop and dysarthria. As per wife, patient had felt off and more tired than per usual. Patient's wife states that his Last known well was around 2 pm when he went for a nap.  She noticed around 4pm after he had woke up, he had slight right facial and mild dysarthria. She stated while she was on the phone with her daughter-in-law, the facial droop became more profound and his dysarthria worsened within 10 minutes. Patient arrived to [facility] as CODE stroke. NIH on arrival was a 3. /84 on arrival. 10mg IVP hydralazine given. CTH negative for intracranial hemorrhage. CTP: no core infarct/ no penumbra. CTA: negative for LVO. TPA was administered at 1813. Patient admitted to [facility]    Hospital Course:   10/30: arrived as code stroke, NIH 3, received TPA at 1813  10/31: CT head negative for hemorrhage, restarted home meds, off cardene   11/1: stable for downgrade to floor, echo done EF 70-75%, Gr 1 DD    Physical Exam    Constitutional: No Acute Distress     Neurological: Awake, alert oriented to person, place and time, Following Commands, PERRL, EOMI, No Gaze Preference,  R Face asymmetry,  Mild dysarhria, No dysmetria, No ataxia, No nystagmus     Motor exam:          Upper extremity                         Delt     Bicep     Tricep    HG                                                 R         5/5        5/5        5/5       5/5                                               L          5/5        5/5        5/5       5/5          Lower extremity                        HF         KF        KE       DF         PF                                                  R        5/5        5/5        5/5       5/5         5/5                                               L         5/5        5/5       5/5       5/5          5/5                                                 Sensation: [X intact to light touch       Pulmonary: Clear to Auscultation, No rales, No rhonchi, No wheezes     Cardiovascular: S1, S2, Regular rate and rhythm     Gastrointestinal: Soft, Non-tender, Non-distended     Extremities: No calf tenderness     Plan  Neuro: q4 neuro checks, pending MRI brain, Dr. Keita consulted for neurology f/u   Cards: -150, LDL 85 on Coreg 3.125 BID, Lisinopril 40, Simvastatin 10, Amlodipine 5. Echo performed and read   Resp: on room air   GI: DASH / CCD   : voiding   Heme: ASA 81mg, Lovenox 40 qd, SCDs for DVT ppx   Endo: A1C 7.3, TSH 1.78. On Lantus 5 ,MISS FS controlled   ID: Afebrile, no issues   PT/OT following     Signed out to Dr. Napier from medicine accepted to his service. Neurology consulted. Downgrade to tele bed

## 2021-11-01 NOTE — DIETITIAN INITIAL EVALUATION ADULT. - OTHER INFO
Pt is a 81 year old Mal with PMHX TIA 2006, Guillian [place] 2006 s/p flu vaccine (baseline gait imbalance per wife), HTN, DM now insulin dependant, cholecystectomy and hernia repair surgery x2 presents with complaints of right facial droop and dysarthria. As per wife, patient had felt off and more tired than per usual. Patient's wife states that his Last known well was around 2 pm when he went for a nap.  She noticed around 4pm after he had woke up, he had slight right facial and mild dysarthria. She stated while she was on the phone with her daughter-in-law, the facial droop became more profound and his dysarthria worsened within 10 minutes. Patient arrived to [facility] as CODE stroke. NIH on arrival was a 3. /84 on arrival. 10mg IVP hydralazine given. CTH negative for intracranial hemorrhage. CTP: no core infarct/ no penumbra. CTA: negative for LVO. TPA was administered at 1813 on 10/30/21. Patient admitted to NSICU.  Pt is awake and alert; pt reports eating well at home, "tries to adhere" to a DM diet. Pt denies any recent weight changes. Pt currently denies any difficulties chewing or swallowing. Pt reports appetite fair since admission, however is improving. Food preferences obtained. Pt declined further diet education t this time.

## 2021-11-01 NOTE — SPEECH LANGUAGE PATHOLOGY EVALUATION - SLP DIAGNOSIS
Primary language deemed generally WFL. Receptively, Pt able to successfully execute multi-step commands & respond to complex yes/no questions. Expressive language noted for intact thought organization/sentence formulation. Episodes of word retrieval difficulty noted within structured/unstructured tasks, however able to self-correct & produce target words w/increased time. Mild cognitive-linguistic deficits, marked by reduced immediate & short-term recall, however improved w/repetition & min semantic prompt to improve retrieval. Motor speech significant for trace dysarthric speech (2* reduced articulatory precision), however does not impact intelligibility at the conversational level.

## 2021-11-01 NOTE — CONSULT NOTE ADULT - ASSESSMENT
The patient is a 81y Male with stroke now status post t-PA with resolution of symptoms.    Stroke.   He is status post t-PA.  No hemorrhage on follow up CT.   LDL: 85  Goal: < 70  Continue antiplatelet and statin.   Awaiting MRI brain.   Mobilize out of bed.    Hypertension   Control blood pressure.    Discharge planning.   Will likely go home as no obvious skilled needs.     Case discussed with Neuro-ICU team (Dr Correa attending) and with Dr Napier (Hospitalist).

## 2021-11-01 NOTE — PROGRESS NOTE ADULT - ASSESSMENT
81 M PMHX TIA 2006, Guillian [place] 2006 s/p flu vaccine (baseline gait imbalance per wife), HTN, DM now insulin dependant, cholecystectomy and hernia repair surgery x2 presents with complaints of right side facial, dysarthria s/p TPA 1813. CTH: no intracranial heme. CTP: no core infarct/ perfusion mismatch. CTA: no LVO  LKW 2pm. NIH 3, MRS 0,   hx TIA, Guillian [place], DM, HTN    PLAN:  Neuro:  -Admit to SDU  -Neuro checks q 2 hrs  - ASA q day  -stroke core measures A1C, Lipid, TSH as above  -pending MRI  - PT/ OT / speech  -neurology consult for F/U- appreciated    CV: HTN  - Amlodipine/lisinopril  -SBP goal 110-160  - EKG  -TTE - see above    Respiratory:  -RA  -PRN NC spo2 <92%  -PRN duonebs for bronchospasm/ wheezing    GI: DASH /CCD diet  - stool softeners    :  -D/C IVF   -No renee  -Monitor NA- 135- 145 goal/  monitor Scr/ electrolytes- K, Phos, Mgt and Ca    ID:  -Monitor for leukocytosis and fever    Heme:  - lovenox for DVT chemoprophylaxsis   -VTE prophylaxis: SCDS    Endo: IR DM   Lantus 5 u qhs   -hx insulin dependant DM  -MISS

## 2021-11-02 ENCOUNTER — TRANSCRIPTION ENCOUNTER (OUTPATIENT)
Age: 81
End: 2021-11-02

## 2021-11-02 VITALS
OXYGEN SATURATION: 97 % | DIASTOLIC BLOOD PRESSURE: 75 MMHG | TEMPERATURE: 98 F | SYSTOLIC BLOOD PRESSURE: 144 MMHG | HEART RATE: 77 BPM | RESPIRATION RATE: 18 BRPM

## 2021-11-02 LAB
ANION GAP SERPL CALC-SCNC: 13 MMOL/L — SIGNIFICANT CHANGE UP (ref 5–17)
BUN SERPL-MCNC: 19.5 MG/DL — SIGNIFICANT CHANGE UP (ref 8–20)
CALCIUM SERPL-MCNC: 9.7 MG/DL — SIGNIFICANT CHANGE UP (ref 8.6–10.2)
CHLORIDE SERPL-SCNC: 100 MMOL/L — SIGNIFICANT CHANGE UP (ref 98–107)
CO2 SERPL-SCNC: 25 MMOL/L — SIGNIFICANT CHANGE UP (ref 22–29)
CREAT SERPL-MCNC: 1.17 MG/DL — SIGNIFICANT CHANGE UP (ref 0.5–1.3)
GLUCOSE BLDC GLUCOMTR-MCNC: 163 MG/DL — HIGH (ref 70–99)
GLUCOSE BLDC GLUCOMTR-MCNC: 170 MG/DL — HIGH (ref 70–99)
GLUCOSE SERPL-MCNC: 156 MG/DL — HIGH (ref 70–99)
HCT VFR BLD CALC: 38.5 % — LOW (ref 39–50)
HGB BLD-MCNC: 12.9 G/DL — LOW (ref 13–17)
MAGNESIUM SERPL-MCNC: 2 MG/DL — SIGNIFICANT CHANGE UP (ref 1.8–2.6)
MCHC RBC-ENTMCNC: 30.1 PG — SIGNIFICANT CHANGE UP (ref 27–34)
MCHC RBC-ENTMCNC: 33.5 GM/DL — SIGNIFICANT CHANGE UP (ref 32–36)
MCV RBC AUTO: 90 FL — SIGNIFICANT CHANGE UP (ref 80–100)
PHOSPHATE SERPL-MCNC: 4.2 MG/DL — SIGNIFICANT CHANGE UP (ref 2.4–4.7)
PLATELET # BLD AUTO: 189 K/UL — SIGNIFICANT CHANGE UP (ref 150–400)
POTASSIUM SERPL-MCNC: 4.4 MMOL/L — SIGNIFICANT CHANGE UP (ref 3.5–5.3)
POTASSIUM SERPL-SCNC: 4.4 MMOL/L — SIGNIFICANT CHANGE UP (ref 3.5–5.3)
RBC # BLD: 4.28 M/UL — SIGNIFICANT CHANGE UP (ref 4.2–5.8)
RBC # FLD: 12.5 % — SIGNIFICANT CHANGE UP (ref 10.3–14.5)
SODIUM SERPL-SCNC: 138 MMOL/L — SIGNIFICANT CHANGE UP (ref 135–145)
WBC # BLD: 8.63 K/UL — SIGNIFICANT CHANGE UP (ref 3.8–10.5)
WBC # FLD AUTO: 8.63 K/UL — SIGNIFICANT CHANGE UP (ref 3.8–10.5)

## 2021-11-02 PROCEDURE — 86901 BLOOD TYPING SEROLOGIC RH(D): CPT

## 2021-11-02 PROCEDURE — 85025 COMPLETE CBC W/AUTO DIFF WBC: CPT

## 2021-11-02 PROCEDURE — 93970 EXTREMITY STUDY: CPT

## 2021-11-02 PROCEDURE — 84100 ASSAY OF PHOSPHORUS: CPT

## 2021-11-02 PROCEDURE — 82962 GLUCOSE BLOOD TEST: CPT

## 2021-11-02 PROCEDURE — 36415 COLL VENOUS BLD VENIPUNCTURE: CPT

## 2021-11-02 PROCEDURE — 99285 EMERGENCY DEPT VISIT HI MDM: CPT

## 2021-11-02 PROCEDURE — 86900 BLOOD TYPING SEROLOGIC ABO: CPT

## 2021-11-02 PROCEDURE — 80048 BASIC METABOLIC PNL TOTAL CA: CPT

## 2021-11-02 PROCEDURE — 70551 MRI BRAIN STEM W/O DYE: CPT | Mod: MA

## 2021-11-02 PROCEDURE — 70498 CT ANGIOGRAPHY NECK: CPT | Mod: MA

## 2021-11-02 PROCEDURE — 87635 SARS-COV-2 COVID-19 AMP PRB: CPT

## 2021-11-02 PROCEDURE — 80061 LIPID PANEL: CPT

## 2021-11-02 PROCEDURE — 70450 CT HEAD/BRAIN W/O DYE: CPT | Mod: MA

## 2021-11-02 PROCEDURE — 85610 PROTHROMBIN TIME: CPT

## 2021-11-02 PROCEDURE — 99239 HOSP IP/OBS DSCHRG MGMT >30: CPT

## 2021-11-02 PROCEDURE — 70496 CT ANGIOGRAPHY HEAD: CPT | Mod: MA

## 2021-11-02 PROCEDURE — 85027 COMPLETE CBC AUTOMATED: CPT

## 2021-11-02 PROCEDURE — 86769 SARS-COV-2 COVID-19 ANTIBODY: CPT

## 2021-11-02 PROCEDURE — 84484 ASSAY OF TROPONIN QUANT: CPT

## 2021-11-02 PROCEDURE — 0042T: CPT

## 2021-11-02 PROCEDURE — 83036 HEMOGLOBIN GLYCOSYLATED A1C: CPT

## 2021-11-02 PROCEDURE — 97167 OT EVAL HIGH COMPLEX 60 MIN: CPT

## 2021-11-02 PROCEDURE — 93005 ELECTROCARDIOGRAM TRACING: CPT

## 2021-11-02 PROCEDURE — 80053 COMPREHEN METABOLIC PANEL: CPT

## 2021-11-02 PROCEDURE — 83735 ASSAY OF MAGNESIUM: CPT

## 2021-11-02 PROCEDURE — 86850 RBC ANTIBODY SCREEN: CPT

## 2021-11-02 PROCEDURE — 84443 ASSAY THYROID STIM HORMONE: CPT

## 2021-11-02 PROCEDURE — 99233 SBSQ HOSP IP/OBS HIGH 50: CPT

## 2021-11-02 PROCEDURE — 85730 THROMBOPLASTIN TIME PARTIAL: CPT

## 2021-11-02 PROCEDURE — 97116 GAIT TRAINING THERAPY: CPT

## 2021-11-02 PROCEDURE — 96374 THER/PROPH/DIAG INJ IV PUSH: CPT

## 2021-11-02 PROCEDURE — 93306 TTE W/DOPPLER COMPLETE: CPT

## 2021-11-02 PROCEDURE — 97163 PT EVAL HIGH COMPLEX 45 MIN: CPT

## 2021-11-02 RX ORDER — ASPIRIN/CALCIUM CARB/MAGNESIUM 324 MG
1 TABLET ORAL
Qty: 30 | Refills: 0
Start: 2021-11-02 | End: 2021-12-01

## 2021-11-02 RX ORDER — ASPIRIN/CALCIUM CARB/MAGNESIUM 324 MG
1 TABLET ORAL
Qty: 30 | Refills: 3
Start: 2021-11-02

## 2021-11-02 RX ORDER — MECLIZINE HCL 12.5 MG
1 TABLET ORAL
Qty: 0 | Refills: 0 | DISCHARGE

## 2021-11-02 RX ADMIN — AMLODIPINE BESYLATE 5 MILLIGRAM(S): 2.5 TABLET ORAL at 09:39

## 2021-11-02 RX ADMIN — LISINOPRIL 40 MILLIGRAM(S): 2.5 TABLET ORAL at 09:38

## 2021-11-02 RX ADMIN — Medication 81 MILLIGRAM(S): at 12:06

## 2021-11-02 RX ADMIN — Medication 125 MICROGRAM(S): at 06:23

## 2021-11-02 RX ADMIN — CARVEDILOL PHOSPHATE 3.12 MILLIGRAM(S): 80 CAPSULE, EXTENDED RELEASE ORAL at 09:39

## 2021-11-02 RX ADMIN — Medication 2: at 09:37

## 2021-11-02 RX ADMIN — Medication 1 TABLET(S): at 12:06

## 2021-11-02 NOTE — PROGRESS NOTE ADULT - PROBLEM SELECTOR PLAN 1
S/P TPA   Improved dysarthria   Stroke measures completed- continue Aspirin and Lipitor   MRI ordered- will expedite   PT/OT consult appreciated.    Neurology following
S/P TPA   Improved dysarthria   Stroke measures completed- continue Aspirin and Lipitor   MRI results noted  PT/OT consult appreciated.  d/c planning    Neurology following

## 2021-11-02 NOTE — DISCHARGE NOTE PROVIDER - NSDCCPCAREPLAN_GEN_ALL_CORE_FT
PRINCIPAL DISCHARGE DIAGNOSIS  Diagnosis: Stroke  Assessment and Plan of Treatment: You were diagnosed with acute cerebral infaction, also known as stroke. Your symptoms have been stabilized. You will need to continue taking your prescribed medication and continue physicial therapy. You will also need to have close routine follow-ups with your Neurologist and primary care physician for your symptoms and managing your risk factors including, but not limited to, hypertension, diabetes, and hyperlipidemia. If your symptoms worsen suddenly or you start to experience new symptoms, please return to the Emergency Room immediately for evaluation.

## 2021-11-02 NOTE — DISCHARGE NOTE PROVIDER - NSDCMRMEDTOKEN_GEN_ALL_CORE_FT
alfuzosin 10 mg oral tablet, extended release: 1 tab(s) orally once a day  amLODIPine 5 mg oral tablet: 1 tab(s) orally once a day  aspirin 81 mg oral delayed release tablet: 1 tab(s) orally once a day  atorvastatin 40 mg oral tablet: 1 tab(s) orally once a day  Avodart 0.5 mg oral capsule: 1 cap(s) orally once a day  baclofen 10 mg oral tablet: 1 tab(s) orally 3 times a day, As Needed  carvedilol 3.125 mg oral tablet: 1 tab(s) orally 2 times a day  Januvia 100 mg oral tablet: 1 tab(s) orally once a day  levothyroxine 125 mcg (0.125 mg) oral tablet: 1 tab(s) orally once a day  quinapril 40 mg oral tablet: 1 tab(s) orally once a day  Tresiba FlexTouch 100 units/mL subcutaneous solution: 10 unit(s) subcutaneous once a day (at bedtime), started recently, patient reports only took 3 doses so far   alfuzosin 10 mg oral tablet, extended release: 1 tab(s) orally once a day  amLODIPine 5 mg oral tablet: 1 tab(s) orally once a day  aspirin 81 mg oral delayed release tablet: 1 tab(s) orally once a day  Aspirin Low Strength 81 mg oral delayed release tablet: 1 tab(s) orally once a day   atorvastatin 40 mg oral tablet: 1 tab(s) orally once a day  Avodart 0.5 mg oral capsule: 1 cap(s) orally once a day  baclofen 10 mg oral tablet: 1 tab(s) orally 3 times a day, As Needed  carvedilol 3.125 mg oral tablet: 1 tab(s) orally 2 times a day  Januvia 100 mg oral tablet: 1 tab(s) orally once a day  levothyroxine 125 mcg (0.125 mg) oral tablet: 1 tab(s) orally once a day  quinapril 40 mg oral tablet: 1 tab(s) orally once a day  Tresiba FlexTouch 100 units/mL subcutaneous solution: 10 unit(s) subcutaneous once a day (at bedtime), started recently, patient reports only took 3 doses so far

## 2021-11-02 NOTE — PROGRESS NOTE ADULT - REASON FOR ADMISSION
Right side facial droop, dysarthria

## 2021-11-02 NOTE — PROGRESS NOTE ADULT - ASSESSMENT
81y Male with stroke now status post t-PA with resolution of symptoms.    Stroke.   He is status post t-PA.  No hemorrhage on follow up CT.   LDL: 85  Goal: < 70  Continue antiplatelet and statin.   Awaiting MRI brain.   Mobilize out of bed.    Hypertension   Control blood pressure.    Discharge planning.   Will likely go home as no obvious skilled needs.     Case discussed with Dr Oreilly.  81y Male with stroke now status post t-PA with resolution of symptoms.    Stroke.   He is status post t-PA.  No hemorrhage on follow up CT.   LDL: 85  Goal: < 70  Continue antiplatelet and statin.   MRI as above.   Mobilize out of bed.    Hypertension   Control blood pressure.    Discharge planning.   Cleared from neuro standpoint.  Will likely go home as no obvious skilled needs.     Case discussed with Dr Oreilly.

## 2021-11-02 NOTE — PROGRESS NOTE ADULT - PROBLEM SELECTOR PLAN 3
Hold home medications  Continue sliding scale and Lantus 5 mg.
Hold home medications  Continue sliding scale and Lantus 5 mg.

## 2021-11-02 NOTE — DISCHARGE NOTE PROVIDER - NSDCFUADDAPPT_GEN_ALL_CORE_FT
Star pt declines meds to bed and medication education.  Star appointment-Neuro MD-Dr Murtaza Mohan in [place] 911 [phone] will call him for the appointment.   Yellow packet given

## 2021-11-02 NOTE — DISCHARGE NOTE PROVIDER - HOSPITAL COURSE
81M with PMHX TIA 2006, Guillian [place] 2006 s/p flu vaccine (baseline gait imbalance per wife), HTN, DM now insulin dependant, cholecystectomy and hernia repair surgery x2 presents with complaints of right facial droop and dysarthria. As per wife, patient had felt off and more tired than per usual. Patient's wife states that his Last known well was around 2 pm when he went for a nap.  She noticed around 4pm after he had woke up, he had slight right facial and mild dysarthria. She stated while she was on the phone with her daughter-in-law, the facial droop became more profound and his dysarthria worsened within 10 minutes. Patient arrived to [facility] as CODE stroke. NIH on arrival was a 3. /84 on arrival. 10mg IVP hydralazine given. CTH negative for intracranial hemorrhage. CTP: no core infarct/ no penumbra. CTA: negative for LVO. TPA was administered at 1813. [facility]    Hospital Course:   10/30: arrived as code stroke, NIH 3, received TPA at 1813  10/31: CT head negative for hemorrhage, restarted home meds, off cardene   11/1: stable for downgrade to floor, echo done EF 70-75%, Gr 1 DD  MRI confirmed a left lacunar CVA, 7mm area at the left phillip     81M with PMHX TIA 2006, Guillian [place] 2006 s/p flu vaccine (baseline gait imbalance per wife), HTN, DM now insulin dependant, cholecystectomy and hernia repair surgery x2 presents with complaints of right facial droop and dysarthria. As per wife, patient had felt off and more tired than per usual. Patient's wife states that his Last known well was around 2 pm when he went for a nap.  She noticed around 4pm after he had woke up, he had slight right facial and mild dysarthria. She stated while she was on the phone with her daughter-in-law, the facial droop became more profound and his dysarthria worsened within 10 minutes. Patient arrived to [facility] as CODE stroke. NIH on arrival was a 3. /84 on arrival. 10mg IVP hydralazine given. CTH negative for intracranial hemorrhage. CTP: no core infarct/ no penumbra. CTA: negative for LVO. TPA was administered at 1813. Patient admitted to [facility]    Hospital Course:   10/30: arrived as code stroke, NIH 3, received TPA at 1813  10/31: CT head negative for hemorrhage, restarted home meds, off cardene   11/1: stable for downgrade to floor, echo done EF 70-75%, Gr 1 DD  MRI confirmed a left lacunar CVA, 7mm area at the left phillip    Post discharge addendum:  Patient admitted with Hypertensive Emergency , SBP of 187 on arrival, treated with 10mg IVP hydralazine, with CVA symptoms

## 2021-11-02 NOTE — PROGRESS NOTE ADULT - ASSESSMENT
81 M PMHX TIA 2006, Guillian [place] 2006 s/p flu vaccine (baseline gait imbalance per wife), HTN, DM now insulin dependant, cholecystectomy and hernia repair surgery x2 presents with complaints of right side facial, dysarthria s/p TPA 1813.

## 2021-11-02 NOTE — DISCHARGE NOTE PROVIDER - CARE PROVIDER_API CALL
PRIYA COREAS  Neurology  [address] [place] [address] [address]  [place] [address]  Phone: [phone]  Fax: [phone]  Established Patient  Follow Up Time:

## 2021-11-02 NOTE — PROGRESS NOTE ADULT - SUBJECTIVE AND OBJECTIVE BOX
Patient reports a difficult night.  Having difficulty with his BM.  Reports he has been up to the bathroom innumerous times and only has gas.    REVIEW OF SYSTEMS  Constitutional - No fever,  +fatigue  Neurological - No loss of strength   Musculoskeletal - No joint pain, No joint swelling, No muscle pain    FUNCTIONAL PROGRESS  11/1 PT  Bed Mobility  Bed Mobility Training Sit-to-Supine: pt. maintained OOB    Sit-Stand Transfer Training  Transfer Training Sit-to-Stand Transfer: supervision;  full weight-bearing   rolling walker  Transfer Training Stand-to-Sit Transfer: verbal cues;  supervision  Sit-to-Stand Transfer Training Transfer Safety Analysis: impaired balance    Gait Training  Gait Training: contact guard;  1 person assist;  full weight-bearing   rolling walker;  150 feet  Gait Analysis: decreased step length;  impaired balance    Therapeutic Exercise  Therapeutic Exercise Charges: Patient educated on bilateral lower extremity therex to perform after rest period (ankle pumps, LAQ, and seated marching 3x10). Patient provided return demonstration.   Bed Mobility  Bed Mobility Training Sit-to-Supine: pt. maintained OOB    Sit-Stand Transfer Training  Transfer Training Sit-to-Stand Transfer: supervision;  full weight-bearing   rolling walker  Transfer Training Stand-to-Sit Transfer: verbal cues;  supervision  Sit-to-Stand Transfer Training Transfer Safety Analysis: impaired balance    Gait Training  Gait Training: contact guard;  1 person assist;  full weight-bearing   rolling walker;  150 feet  Gait Analysis: decreased step length;  impaired balance    Therapeutic Exercise  Therapeutic Exercise Charges: Patient educated on bilateral lower extremity therex to perform after rest period (ankle pumps, LAQ, and seated marching 3x10). Patient provided return demonstration.     11/1 SLP  · Diagnosis	Primary language deemed generally WFL. Receptively, Pt able to successfully execute multi-step commands & respond to complex yes/no questions. Expressive language noted for intact thought organization/sentence formulation. Episodes of word retrieval difficulty noted within structured/unstructured tasks, however able to self-correct & produce target words w/increased time. Mild cognitive-linguistic deficits, marked by reduced immediate & short-term recall, however improved w/repetition & min semantic prompt to improve retrieval. Motor speech significant for trace dysarthric speech (2* reduced articulatory precision), however does not impact intelligibility at the conversational level.  · Patient/Family Goals Statement	None offered  · Criteria for Skilled Therapeutic Interventions Met	skilled criteria for intervention met; cognitive tx  · Therapy Frequency	as schedule permits  · Anticipated Discharge Disposition	home w/ assist    Behavioral Exam:   · Behavioral Observations	within functional limits  · Orientation	x4  · Visual Perception and Processing	intact    Auditory Comprehension:   · Able to Identify Objects	within functional limits  · Able to Identify Pictures	within functional limits  · Answers Yes/No Questions	within functional limits  · complex	100%  · 3-step	100%  · Discourse	intact  · Right/Left Discrimination	intact  · Body Part ID	intact  · Open Ended Questions	intact    Verbal Expression:   · Verbal Expression	intact  · Automatic Speech	intact  · Confrontational Naming	intact  · Responsive Naming	intact  · Repetition	impaired; sentence; 2* reduced retention/recall vs language deficit  · Conversational Speech	adequate thought organization/sentence formulation    Reading:   · Comments	TBD    Cognitive Linguistic Status Examination:   · Attention	intact  · Short Term Memory	impaired  · Long Term Memory	intact  · Problem Solving	intact  · Reasoning	intact  · Organization	intact  · Sequencing	intact    Writing:   · Comments	TBD    Motor Speech:   · Observations	slurred speech; trace; reduced articulatory precision  · Articulation	trace dysarthria, however does not impact intelligibility at the conversational level  · Volume	intact    Voice:   · Voice	intact  · Resonance	intact    VITALS  T(C): 36.6 (11-02-21 @ 07:48), Max: 37 (11-01-21 @ 19:28)  HR: 77 (11-02-21 @ 07:48) (58 - 77)  BP: 144/75 (11-02-21 @ 07:48) (98/67 - 167/91)  RR: 18 (11-02-21 @ 07:48) (15 - 25)  SpO2: 97% (11-02-21 @ 07:48) (94% - 100%)  Wt(kg): --    MEDICATIONS   amLODIPine   Tablet 5 milliGRAM(s) daily  aspirin enteric coated 81 milliGRAM(s) daily  atorvastatin 40 milliGRAM(s) at bedtime  carvedilol 3.125 milliGRAM(s) every 12 hours  dextrose 40% Gel 15 Gram(s) once  dextrose 5%. 1000 milliLiter(s) <Continuous>  dextrose 5%. 1000 milliLiter(s) <Continuous>  dextrose 50% Injectable 25 Gram(s) once  dextrose 50% Injectable 12.5 Gram(s) once  dextrose 50% Injectable 25 Gram(s) once  enoxaparin Injectable 40 milliGRAM(s) at bedtime  glucagon  Injectable 1 milliGRAM(s) once  hydrALAZINE Injectable 10 milliGRAM(s) every 2 hours PRN  insulin glargine Injectable (LANTUS) 5 Unit(s) at bedtime  insulin lispro (ADMELOG) corrective regimen sliding scale   two times a day before meals  levothyroxine 125 MICROGram(s) daily  lisinopril 40 milliGRAM(s) daily  multivitamin 1 Tablet(s) daily      RECENT LABS/IMAGING                          12.9   8.63  )-----------( 189      ( 02 Nov 2021 07:33 )             38.5     11-02    138  |  100  |  19.5  ----------------------------<  156<H>  4.4   |  25.0  |  1.17    Ca    9.7      02 Nov 2021 07:33  Phos  4.2     11-02  Mg     2.0     11-02                  CT BRAIN 10/30 - No acute infarction or hemorrhage. Findings discussed with Dr. La at 5:46 PM.  Consider MRI as clinically warranted.    CTP BRAIN 10/30 - Negative CTP.    CTA BRAIN/NECK  10/30 -  No large vessel occlusion or high-grade stenosis.    CT HEAD 10/31 - Mild chronic microvascular changes without evidence of an acute transcortical infarction or hemorrhage.    MRI HEAD 11/1 - Small 7 mm focus of restricted diffusion in the left phillip, compatible with an acute lacunar infarct. No associated hemorrhage is present. There are scattered foci of FLAIR hyperintensity in the periventricular and subcortical white matter of the bilateral cerebri, compatible with mild chronic microvascular ischemic changes.  ----------------------------------------------------------------------------------------  PHYSICAL EXAM  Constitutional - NAD, Uncomfortable  Extremities - No C/C/E, No calf tenderness   Neurologic Exam -                    Cognitive - AAO to self, place, date, year, situation     Communication - Fluent, +dysarthria     Cranial Nerves - Right lip droop     Motor - No focal deficits     Sensory - Intact to LT  Psychiatric - Mood anxious, Affect WNL  ----------------------------------------------------------------------------------------  ASSESSMENT/PLAN  81yMale with functional deficits after having dysarthria  Left phillip CVA - ASA, Lipitor   DM2 - Lantus  HTN - Hydralazine, Norvasc, Coreg, Lisinopril   Pain - Tylenol  Constipation - May need PRN Suppository/Fleet if persists  DVT PPX - SCDs, Lovenox  Rehab - Medically being optimized. Plan is for DC HOME.     Recommend ongoing mobilization by staff to maintain cardiopulmonary function and prevention of secondary complications related to debility. Discussed with rehab team.     
                            [facility]                                        Neurology at [place]                                 River Carrero, & Kenneth                                  [address]                                        [place], [place], [address]                                             [phone]        CC: Stroke    HPI:   The patient is a 81y Male who presented 10/30/21 with speech difficulty and right sided facial drooping.   He was brought to the ER where the stroke code was activated.   The last known well time was noted to be 2 pm the day of arrival.   He received t-PA and was admitted to the ICU.   Repeat CT head was negative on 10/31.  He is being downgraded to the medical service and Neurology was called.     Interim history:  Now in 3 [place].     ROS:   Denies headache or dizziness.  Denies chest pain.  Denies shortness of breath.    MEDICATIONS  (STANDING):  amLODIPine   Tablet 5 milliGRAM(s) Oral daily  aspirin enteric coated 81 milliGRAM(s) Oral daily  atorvastatin 40 milliGRAM(s) Oral at bedtime  carvedilol 3.125 milliGRAM(s) Oral every 12 hours  dextrose 40% Gel 15 Gram(s) Oral once  dextrose 5%. 1000 milliLiter(s) (50 mL/Hr) IV Continuous <Continuous>  enoxaparin Injectable 40 milliGRAM(s) SubCutaneous at bedtime  glucagon  Injectable 1 milliGRAM(s) IntraMuscular once  insulin glargine Injectable (LANTUS) 5 Unit(s) SubCutaneous at bedtime  insulin lispro (ADMELOG) corrective regimen sliding scale   SubCutaneous two times a day before meals  levothyroxine 125 MICROGram(s) Oral daily  lisinopril 40 milliGRAM(s) Oral daily  multivitamin 1 Tablet(s) Oral daily    Vital Signs Last 24 Hrs  T(C): 36.6 (02 Nov 2021 07:48), Max: 37 (01 Nov 2021 19:28)  T(F): 97.8 (02 Nov 2021 07:48), Max: 98.6 (01 Nov 2021 19:28)  HR: 77 (02 Nov 2021 07:48) (58 - 77)  BP: 144/75 (02 Nov 2021 07:48) (98/67 - 167/91)  BP(mean): 78 (02 Nov 2021 00:01) (78 - 113)  RR: 18 (02 Nov 2021 07:48) (15 - 25)  SpO2: 97% (02 Nov 2021 07:48) (94% - 100%)    Detailed Neurologic Exam:    Mental status: The patient is awake, alert, and fully oriented. There is no aphasia. Attention span is normal. Patient is aware of current events.     Cranial nerves: There is no papilledema. Pupils react symmetrically to light. There is no visual field deficit to confrontation. Extraocular motion is full with no nystagmus.  Facial sensation is intact. Facial musculature is symmetric. Palate elevates symmetrically. Tongue is midline.    Motor: There is normal bulk and tone.  Strength is 5/5 in the right arm and leg.   Strength is 5/5 in the left arm and leg.    Sensation: Intact to light touch and pin. There is no extinction to double simultaneous stimulation.    Reflexes: 2+ throughout and plantar responses are flexor.    Cerebellar: There is no dysmetria on finger to nose testing.    Labs:     11-02    138  |  100  |  19.5  ----------------------------<  156<H>  4.4   |  25.0  |  1.17    Ca    9.7      02 Nov 2021 07:33  Phos  4.2     11-02  Mg     2.0     11-02                              12.9   8.63  )-----------( 189      ( 02 Nov 2021 07:33 )             38.5       Rad:   Brain MRI images reviewed. There is an acute lacunar infarct in the left phillip measuring about 7 mm.      
Patient is a 81y old  Male who presents with a chief complaint of Right side facial droop, dysarthria (02 Nov 2021 09:03)      SUBJECTIVE / OVERNIGHT EVENTS: c/o difficulty moving his bowels    MEDICATIONS  (STANDING):  amLODIPine   Tablet 5 milliGRAM(s) Oral daily  aspirin enteric coated 81 milliGRAM(s) Oral daily  atorvastatin 40 milliGRAM(s) Oral at bedtime  carvedilol 3.125 milliGRAM(s) Oral every 12 hours  dextrose 40% Gel 15 Gram(s) Oral once  dextrose 5%. 1000 milliLiter(s) (50 mL/Hr) IV Continuous <Continuous>  dextrose 5%. 1000 milliLiter(s) (100 mL/Hr) IV Continuous <Continuous>  dextrose 50% Injectable 25 Gram(s) IV Push once  dextrose 50% Injectable 12.5 Gram(s) IV Push once  dextrose 50% Injectable 25 Gram(s) IV Push once  enoxaparin Injectable 40 milliGRAM(s) SubCutaneous at bedtime  glucagon  Injectable 1 milliGRAM(s) IntraMuscular once  insulin glargine Injectable (LANTUS) 5 Unit(s) SubCutaneous at bedtime  insulin lispro (ADMELOG) corrective regimen sliding scale   SubCutaneous two times a day before meals  levothyroxine 125 MICROGram(s) Oral daily  lisinopril 40 milliGRAM(s) Oral daily  multivitamin 1 Tablet(s) Oral daily    MEDICATIONS  (PRN):  hydrALAZINE Injectable 10 milliGRAM(s) IV Push every 2 hours PRN SBP>150      T(C): 36.6 (11-02-21 @ 07:48), Max: 37 (11-01-21 @ 19:28)  HR: 77 (11-02-21 @ 07:48) (58 - 77)  BP: 144/75 (11-02-21 @ 07:48) (98/67 - 167/91)  RR: 18 (11-02-21 @ 07:48) (15 - 25)  SpO2: 97% (11-02-21 @ 07:48) (94% - 100%)  CAPILLARY BLOOD GLUCOSE      POCT Blood Glucose.: 170 mg/dL (02 Nov 2021 09:25)  POCT Blood Glucose.: 133 mg/dL (01 Nov 2021 22:05)  POCT Blood Glucose.: 192 mg/dL (01 Nov 2021 16:39)    I&O's Summary    01 Nov 2021 07:01  -  02 Nov 2021 07:00  --------------------------------------------------------  IN: 890 mL / OUT: 600 mL / NET: 290 mL        PHYSICAL EXAM:  GENERAL: NAD, well-developed  HEAD:  Atraumatic, Normocephalic  EYES: EOMI, PERRLA, conjunctiva and sclera clear  NECK: Supple, No JVD  CHEST/LUNG: Clear to auscultation bilaterally; No wheeze  HEART: Regular rate and rhythm; No murmurs, rubs, or gallops  ABDOMEN: Soft, Nontender, Nondistended; Bowel sounds present  EXTREMITIES:  2+ Peripheral Pulses, No clubbing, cyanosis, or edema  PSYCH: AAOx3  NEUROLOGY: non-focal  SKIN: No rashes or lesions    LABS:                        12.9   8.63  )-----------( 189      ( 02 Nov 2021 07:33 )             38.5     11-02    138  |  100  |  19.5  ----------------------------<  156<H>  4.4   |  25.0  |  1.17    Ca    9.7      02 Nov 2021 07:33  Phos  4.2     11-02  Mg     2.0     11-02                RADIOLOGY & ADDITIONAL TESTS: MRI with acute left lacunar infarct, phillip    Imaging Personally Reviewed:    Consultant(s) Notes Reviewed:      Care Discussed with Consultants/Other Providers:  
SUMMARY:HPI:  81M with PMHX TIA 2006, Guillian [place] 2006 s/p flu vaccine (baseline gait imbalance per wife), HTN, DM now insulin dependant, cholecystectomy and hernia repair surgery x2 presents with complaints of right facial droop and dysarthria. As per wife, patient had felt off and more tired than per usual. Patient's wife states that his Last known well was around 2 pm when he went for a nap.  She noticed around 4pm after he had woke up, he had slight right facial and mild dysarthria. She stated while she was on the phone with her daughter-in-law, the facial droop became more profound and his dysarthria worsened within 10 minutes. Patient [facility] to [facility] as CODE stroke. NIH on arrival was a 3. /84 on arrival. 10mg IVP hydralazine given. CTH negative for intracranial hemorrhage. CTP: no core infarct/ no penumbra. CTA: negative for LVO. TPA was administered at 1813 on 10/30/21. Patient admitted to NSICU      ADMISSION SCORES:   NIHSS- 3  mRS- 0:  Allergies    No Known Allergies    Intolerances        REVIEW OF SYSTEMS:   [ X] All ROS addressed below are non-contributory, except:  Neuro: [ ] Headache [ ] Back pain [ ] Numbness [ ] Weakness [ ] Ataxia [ ] Dizziness [ ] Aphasia [X ] Dysarthria [ ] Visual disturbance  Resp: [ ] Shortness of breath/dyspnea, [ ] Orthopnea [ ] Cough  CV: [ ] Chest pain [ ] Palpitation [ ] Lightheadedness [ ] Syncope  Renal: [ ] Thirst [ ] Edema  GI: [ ] Nausea [ ] Emesis [ ] Abdominal pain [ ] Constipation [ ] Diarrhea  Hem: [ ] Hematemesis [ ] bright red blood per rectum  ID: [ ] Fever [ ] Chills [ ] Dysuria  ENT: [ ] Rhinorrhea    ICU Vital Signs Last 24 Hrs  T(C): 36.8 (01 Nov 2021 07:30), Max: 36.9 (01 Nov 2021 03:00)  T(F): 98.2 (01 Nov 2021 07:30), Max: 98.4 (01 Nov 2021 03:00)  HR: 65 (01 Nov 2021 08:00) (53 - 84)  BP: 142/83 (01 Nov 2021 08:00) (134/72 - 192/75)  BP(mean): 84 (01 Nov 2021 06:00) (79 - 106)  RR: 14 (01 Nov 2021 08:00) (12 - 22)  SpO2: 98% (01 Nov 2021 08:00) (94% - 98%)      MEDICATIONS  (STANDING):  amLODIPine   Tablet 5 milliGRAM(s) Oral daily  aspirin enteric coated 81 milliGRAM(s) Oral daily  carvedilol 3.125 milliGRAM(s) Oral every 12 hours  dextrose 40% Gel 15 Gram(s) Oral once  dextrose 5%. 1000 milliLiter(s) (50 mL/Hr) IV Continuous <Continuous>  dextrose 5%. 1000 milliLiter(s) (100 mL/Hr) IV Continuous <Continuous>  dextrose 50% Injectable 25 Gram(s) IV Push once  dextrose 50% Injectable 12.5 Gram(s) IV Push once  dextrose 50% Injectable 25 Gram(s) IV Push once  enoxaparin Injectable 40 milliGRAM(s) SubCutaneous at bedtime  glucagon  Injectable 1 milliGRAM(s) IntraMuscular once  insulin glargine Injectable (LANTUS) 5 Unit(s) SubCutaneous at bedtime  insulin lispro (ADMELOG) corrective regimen sliding scale   SubCutaneous two times a day before meals  lisinopril 40 milliGRAM(s) Oral daily  simvastatin 10 milliGRAM(s) Oral at bedtime    MEDICATIONS  (PRN):  hydrALAZINE Injectable 10 milliGRAM(s) IV Push every 2 hours PRN SBP>160  labetalol Injectable 10 milliGRAM(s) IV Push every 2 hours PRN Systolic blood pressure >160      31 Oct 2021 07:01  -  01 Nov 2021 07:00  --------------------------------------------------------  IN:    NiCARdipine: 37.5 mL    NiCARdipine: 225 mL    Oral Fluid: 680 mL    sodium chloride 0.9%: 150 mL  Total IN: 1092.5 mL    OUT:    Voided (mL): 2050 mL  Total OUT: 2050 mL    Total NET: -957.5 mL      01 Nov 2021 07:01  -  01 Nov 2021 08:41  --------------------------------------------------------  IN:    IV PiggyBack: 50 mL    Oral Fluid: 240 mL  Total IN: 290 mL    OUT:    Voided (mL): 150 mL  Total OUT: 150 mL    Total NET: 140 mL            LABS:    CAPILLARY BLOOD GLUCOSE      POCT Blood Glucose.: 151 mg/dL (01 Nov 2021 07:31)  POCT Blood Glucose.: 170 mg/dL (31 Oct 2021 21:16)  POCT Blood Glucose.: 144 mg/dL (31 Oct 2021 16:47)  POCT Blood Glucose.: 184 mg/dL (31 Oct 2021 12:27)      PT/INR - ( 30 Oct 2021 17:47 )   PT: 11.8 sec;   INR: 1.02 ratio    PTT - ( 30 Oct 2021 17:47 )  PTT:26.8 sec    11-01    138  |  103  |  14.5  ----------------------------<  145<H>  4.1   |  24.0  |  0.98    Ca    9.3      01 Nov 2021 05:31  Phos  3.5     11-01  Mg     1.9     11-01    TPro  6.8  /  Alb  4.1  /  TBili  0.3<L>  /  DBili  x   /  AST  15  /  ALT  10  /  AlkPhos  66  10-30                          12.8   7.28  )-----------( 154      ( 31 Oct 2021 05:26 )             36.8         STROKE CORE MEASURES:   HGBA1C- 7.2  TRIG- 176  LDL- 85  TSH- 1.78     MEDICATION LEVELS:     IMAGING/DATA: [X ] Reviewed     CT Head No Cont (10.31.21 @ 18:30) >  TECHNIQUE: Noncontrast CT of the head. Multiplanar reformations are submitted.    FINDINGS:  There is periventricular and subcortical white matter hypodensity without mass effect, nonspecific, likely representing mild chronic microvascular ischemic changes. There is no compelling evidence for an acute transcortical infarction. There is no evidence of mass, mass effect, midline shift or extra-axial fluid collection. The lateral ventricles and cortical sulci are age-appropriate in size and configuration. The patient is status post bilateral ocular lens replacement surgery. The orbits, mastoid air cells and visualized paranasal sinuses are unremarkable. The calvarium is intact. Consider MRI as clinically warranted.    IMPRESSION:  Mild chronic microvascular changes without evidence of an acute transcortical infarction or hemorrhage.           CT Angio Head w/ IV Cont (10.30.21 @ 17:52) >    IMPRESSION:      CT BRAIN: No acute infarction or hemorrhage. Findings discussed with Dr. La at 5:46 PM.  Consider MRI as clinically warranted.    CTP BRAIN: Negative CTP.    CTA BRAIN/NECK: No large vessel occlusion or high-grade stenosis.    ECHO    Summary:   1. Left ventricular ejection fraction, by visual estimation, is 70 to 75%.   2. Hyperdynamic global left ventricular systolic function.   3. Normal left ventricular internal cavity size.   4. Spectral Doppler shows impaired relaxation pattern of left ventricular myocardial filling (Grade I diastolic dysfunction).   5. Normal right ventricular size and function.   6. The left atrium is normal in size.   7. The right atrium is normal in size.   8. Sclerotic aortic valve with normal opening.   9. Mild thickening of the anterior and posterior mitral valve leaflets.      EXAMINATION:  PHYSICAL EXAM:    Constitutional: No Acute Distress     Neurological: Awake, alert oriented to person, place and time, Following Commands, PERRL, EOMI, No Gaze Preference,  R Face asymmetry,  Mild dysarhria, No dysmetria, No ataxia, No nystagmus     Motor exam:          Upper extremity                         Delt     Bicep     Tricep    HG                                                 R         5/5        5/5        5/5       5/5                                               L          5/5        5/5        5/5       5/5          Lower extremity                        HF         KF        KE       DF         PF                                                  R        5/5        5/5        5/5       5/5         5/5                                               L         5/5        5/5       5/5       5/5          5/5                                                 Sensation: [X intact to light touch       Pulmonary: Clear to Auscultation, No rales, No rhonchi, No wheezes     Cardiovascular: S1, S2, Regular rate and rhythm     Gastrointestinal: Soft, Non-tender, Non-distended     Extremities: No calf tenderness   
SUMMARY:HPI:  81M with PMHX TIA 2006, Guillian [place] 2006 s/p flu vaccine (baseline gait imbalance per wife), HTN, DM now insulin dependant, cholecystectomy and hernia repair surgery x2 presents with complaints of right facial droop and dysarthria. As per wife, patient had felt off and more tired than per usual. Patient's wife states that his Last known well was around 2 pm when he went for a nap.  She noticed around 4pm after he had woke up, he had slight right facial and mild dysarthria. She stated while she was on the phone with her daughter-in-law, the facial droop became more profound and his dysarthria worsened within 10 minutes. Patient arrived to [facility] as CODE stroke. NIH on arrival was a 3. /84 on arrival. 10mg IVP hydralazine given. CTH negative for intracranial hemorrhage. CTP: no core infarct/ no penumbra. CTA: negative for LVO. TPA was administered at 1813. Patient admitted to NSICU      ADMISSION SCORES:   NIHSS- 3  mRS- 0:  Allergies    No Known Allergies    Intolerances        REVIEW OF SYSTEMS:   [ X] All ROS addressed below are non-contributory, except:  Neuro: [ ] Headache [ ] Back pain [ ] Numbness [ ] Weakness [ ] Ataxia [ ] Dizziness [ ] Aphasia [X ] Dysarthria [ ] Visual disturbance  Resp: [ ] Shortness of breath/dyspnea, [ ] Orthopnea [ ] Cough  CV: [ ] Chest pain [ ] Palpitation [ ] Lightheadedness [ ] Syncope  Renal: [ ] Thirst [ ] Edema  GI: [ ] Nausea [ ] Emesis [ ] Abdominal pain [ ] Constipation [ ] Diarrhea  Hem: [ ] Hematemesis [ ] bright red blood per rectum  ID: [ ] Fever [ ] Chills [ ] Dysuria  ENT: [ ] Rhinorrhea    VITALS: [X ] Reviewed  Vital Signs Last 24 Hrs  T(C): 36.4 (31 Oct 2021 04:02), Max: 36.8 (30 Oct 2021 18:10)  T(F): 97.6 (31 Oct 2021 04:02), Max: 98.2 (30 Oct 2021 18:10)  HR: 56 (31 Oct 2021 06:13) (51 - 74)  BP: 166/69 (31 Oct 2021 06:13) (139/57 - 199/84)  BP(mean): 97 (31 Oct 2021 06:00) (65 - 101)  RR: 13 (31 Oct 2021 06:13) (10 - 22)  SpO2: 94% (31 Oct 2021 06:13) (91% - 100%)  CAPILLARY BLOOD GLUCOSE      POCT Blood Glucose.: 127 mg/dL (31 Oct 2021 05:32)  POCT Blood Glucose.: 161 mg/dL (30 Oct 2021 23:56)  POCT Blood Glucose.: 208 mg/dL (30 Oct 2021 17:36)        LABS:  PT/INR - ( 30 Oct 2021 17:47 )   PT: 11.8 sec;   INR: 1.02 ratio    PTT - ( 30 Oct 2021 17:47 )  PTT:26.8 sec    10-31    139  |  104  |  16.8  ----------------------------<  121<H>  3.4<L>   |  24.0  |  0.99    Ca    8.9      31 Oct 2021 05:26  Phos  3.6     10-31  Mg     1.7     10-31    TPro  6.8  /  Alb  4.1  /  TBili  0.3<L>  /  DBili  x   /  AST  15  /  ALT  10  /  AlkPhos  66  10-30                          12.8   7.28  )-----------( 154      ( 31 Oct 2021 05:26 )             36.8       STROKE CORE MEASURES:   HGBA1C- 7.2  TRIG- 176  LDL- 85  TSH- 1.78     MEDICATION LEVELS:     IMAGING/DATA: [X ] Reviewed     CT Angio Head w/ IV Cont (10.30.21 @ 17:52) >    IMPRESSION:      CT BRAIN: No acute infarction or hemorrhage. Findings discussed with Dr. La at 5:46 PM.  Consider MRI as clinically warranted.    CTP BRAIN: Negative CTP.    CTA BRAIN/NECK: No large vessel occlusion or high-grade stenosis.          IVF FLUIDS/MEDICATIONS: [ ] Reviewed  MEDICATIONS  (STANDING):  dextrose 40% Gel 15 Gram(s) Oral once  dextrose 5%. 1000 milliLiter(s) (50 mL/Hr) IV Continuous <Continuous>  dextrose 5%. 1000 milliLiter(s) (100 mL/Hr) IV Continuous <Continuous>  dextrose 50% Injectable 25 Gram(s) IV Push once  dextrose 50% Injectable 12.5 Gram(s) IV Push once  dextrose 50% Injectable 25 Gram(s) IV Push once  glucagon  Injectable 1 milliGRAM(s) IntraMuscular once  insulin lispro (ADMELOG) corrective regimen sliding scale   SubCutaneous every 6 hours  magnesium sulfate  IVPB 2 Gram(s) IV Intermittent once  niCARdipine Infusion 5 mG/Hr (25 mL/Hr) IV Continuous <Continuous>  potassium chloride   Powder 40 milliEquivalent(s) Oral once  sodium chloride 0.9%. 1000 milliLiter(s) (50 mL/Hr) IV Continuous <Continuous>    MEDICATIONS  (PRN):  hydrALAZINE Injectable 5 milliGRAM(s) IV Push every 4 hours PRN SBP> 180  labetalol Injectable 10 milliGRAM(s) IV Push every 4 hours PRN Systolic blood pressure >180    I&O's Summary    30 Oct 2021 07:01  -  31 Oct 2021 07:00  --------------------------------------------------------  IN: 800 mL / OUT: 1200 mL / NET: -400 mL        EXAMINATION:  PHYSICAL EXAM:    Constitutional: No Acute Distress     Neurological: Awake, alert oriented to person, place and time, Following Commands, PERRL, EOMI, No Gaze Preference,  R Face asymmetry,  Mild dysarhria, No dysmetria, No ataxia, No nystagmus     Motor exam:          Upper extremity                         Delt     Bicep     Tricep    HG                                                 R         5/5        5/5        5/5       5/5                                               L          5/5        5/5        5/5       5/5          Lower extremity                        HF         KF        KE       DF         PF                                                  R        5/5        5/5        5/5       5/5         5/5                                               L         5/5        5/5       5/5       5/5          5/5                                                 Sensation: [X intact to light touch       Pulmonary: Clear to Auscultation, No rales, No rhonchi, No wheezes     Cardiovascular: S1, S2, Regular rate and rhythm     Gastrointestinal: Soft, Non-tender, Non-distended     Extremities: No calf tenderness   
Transfer Note      Hospital Course:     81M with PMHX TIA 2006, Guillian [place] 2006 s/p flu vaccine (baseline gait imbalance per wife), HTN, DM now insulin dependant, cholecystectomy and hernia repair surgery x2 presented with complaints of right facial droop and dysarthria.  Patient's wife states that his Last known well was around 2 pm when he went for a nap.  She noticed around 4pm after he had woke up, he had slight right facial and mild dysarthria.. NIH on arrival was a 3. /84 on arrival. 10mg IVP hydralazine given. CTH negative for intracranial hemorrhage. CTP: no core infarct/ no penumbra. CTA: negative for LVO. TPA was administered at 1813. Patient admitted to [facility]    Hospital Course:   10/30: arrived as code stroke, NIH 3, received TPA at 1813  10/31: CT head negative for hemorrhage, restarted home meds, off cardene   11/1: stable for downgrade to floor, echo done EF 70-75%, Gr 1 DD    [facility] Division of Hospital Medicine    Chief Complaint:  Stroke     SUBJECTIVE / OVERNIGHT EVENTS:  No acute overnight events, patient reports feeling well.    Patient denies chest pain, SOB, abd pain, N/V, fever, chills, dysuria or any other complaints. All remainder ROS negative.     MEDICATIONS  (STANDING):  ALPRAZolam 0.5 milliGRAM(s) Oral once  amLODIPine   Tablet 5 milliGRAM(s) Oral daily  aspirin enteric coated 81 milliGRAM(s) Oral daily  atorvastatin 40 milliGRAM(s) Oral at bedtime  carvedilol 3.125 milliGRAM(s) Oral every 12 hours  dextrose 40% Gel 15 Gram(s) Oral once  dextrose 5%. 1000 milliLiter(s) (50 mL/Hr) IV Continuous <Continuous>  dextrose 5%. 1000 milliLiter(s) (100 mL/Hr) IV Continuous <Continuous>  dextrose 50% Injectable 25 Gram(s) IV Push once  dextrose 50% Injectable 12.5 Gram(s) IV Push once  dextrose 50% Injectable 25 Gram(s) IV Push once  enoxaparin Injectable 40 milliGRAM(s) SubCutaneous at bedtime  glucagon  Injectable 1 milliGRAM(s) IntraMuscular once  insulin glargine Injectable (LANTUS) 5 Unit(s) SubCutaneous at bedtime  insulin lispro (ADMELOG) corrective regimen sliding scale   SubCutaneous two times a day before meals  lisinopril 40 milliGRAM(s) Oral daily  multivitamin 1 Tablet(s) Oral daily    MEDICATIONS  (PRN):  hydrALAZINE Injectable 10 milliGRAM(s) IV Push every 2 hours PRN SBP>150        I&O's Summary    31 Oct 2021 07:01  -  01 Nov 2021 07:00  --------------------------------------------------------  IN: 1092.5 mL / OUT: 2050 mL / NET: -957.5 mL    01 Nov 2021 07:01  -  01 Nov 2021 10:25  --------------------------------------------------------  IN: 290 mL / OUT: 150 mL / NET: 140 mL        PHYSICAL EXAM:  Vital Signs Last 24 Hrs  T(C): 36.8 (01 Nov 2021 07:30), Max: 36.9 (01 Nov 2021 03:00)  T(F): 98.2 (01 Nov 2021 07:30), Max: 98.4 (01 Nov 2021 03:00)  HR: 69 (01 Nov 2021 10:00) (53 - 84)  BP: 138/67 (01 Nov 2021 10:00) (134/72 - 192/75)  BP(mean): 88 (01 Nov 2021 10:00) (79 - 106)  RR: 16 (01 Nov 2021 10:00) (12 - 23)  SpO2: 96% (01 Nov 2021 10:00) (94% - 98%)        CONSTITUTIONAL: NAD,   ENMT: Moist oral mucosa, no pharyngeal injection    RESPIRATORY: Normal respiratory effort; lungs are clear to auscultation   CARDIOVASCULAR: Regular rate and rhythm, normal S1 and S2, no murmur   ABDOMEN: Nontender to palpation, normoactive bowel sounds, no rebound   PSYCH: A+O to person, place, and time; affect appropriate  NEUROLOGY: CN 2-12 are intact and symmetric; no gross sensory deficits;   SKIN: No rashes; no palpable lesions    LABS:                        12.8   7.28  )-----------( 154      ( 31 Oct 2021 05:26 )             36.8     11-01    138  |  103  |  14.5  ----------------------------<  145<H>  4.1   |  24.0  |  0.98    Ca    9.3      01 Nov 2021 05:31  Phos  3.5     11-01  Mg     1.9     11-01    TPro  6.8  /  Alb  4.1  /  TBili  0.3<L>  /  DBili  x   /  AST  15  /  ALT  10  /  AlkPhos  66  10-30    PT/INR - ( 30 Oct 2021 17:47 )   PT: 11.8 sec;   INR: 1.02 ratio         PTT - ( 30 Oct 2021 17:47 )  PTT:26.8 sec  CARDIAC MARKERS ( 30 Oct 2021 17:47 )  x     / <0.01 ng/mL / x     / x     / x              CAPILLARY BLOOD GLUCOSE      POCT Blood Glucose.: 151 mg/dL (01 Nov 2021 07:31)  POCT Blood Glucose.: 170 mg/dL (31 Oct 2021 21:16)  POCT Blood Glucose.: 144 mg/dL (31 Oct 2021 16:47)  POCT Blood Glucose.: 184 mg/dL (31 Oct 2021 12:27)        RADIOLOGY & ADDITIONAL TESTS:  Results Reviewed:   Imaging Personally Reviewed:  Electrocardiogram Personally Reviewed:

## 2021-11-03 NOTE — CDI QUERY NOTE - NSCDIOTHERTXTBX_GEN_ALL_CORE_HH
81 year old male presenting to [facility] on 10/30/2021 with slurred speech and facial droop.  Vital signs on 10/30/2021 show /84 and 199/84, treated with IV Hydralzine.  The 10/30/2021 ED Provider Note documents "Hypertension requiring aggressive control with IV medications".  The 11/1/2021 Hospitalist Progress Note documents "Accelerated hypertension".    Please clarify if another diagnosis could be associated with Accelerated Hypertension.    A)  Hypertensive Emergency  B)  Other, please specify  C)  Not clinically significant      Supporting Documentation:       ,,ED ADULT Flow Sheet [Charted Location: [facility] ER 1606 06] [Authored: 30-Oct-2021 18:04]- for Visit: [id number],  Mary Roper (RN); Ksenia Herrera (RN), Complete, Revised, Signed in Full, General  Vital Signs  Heart Rate  Heart Rate Heart Rate (beats/min): 65 /min  Heart Rate Method Method: cardiac monitor  Noninvasive Blood Pressure  BP Systolic Systolic: 187 mm Hg  BP Diastolic Diastolic (mm Hg): 84 mm Hg    ,,ED ADULT Flow Sheet [Charted Location: [facility] ED] [Authored: 30-Oct-2021 18:07]- for Visit: [id number],  Mary Roper (MERCEDES), Complete, Entered, Signed in Full, General  Vital Signs  Heart Rate  Heart Rate Heart Rate (beats/min): 66 /min  Heart Rate Method Method: cardiac monitor  Noninvasive Blood Pressure  BP Systolic Systolic: 199 mm Hg  BP Diastolic Diastolic (mm Hg): 84 mm Hg      ED Provider Note [Charted Location: [place] [address] 01] [Authored: 30-Oct-2021 18:29]  Hypertension requiring aggressive control with IV medications.      Progress Note Adult-Hospitalist Attending [Charted Location: [place] [address] 01] [Authored: 01-Nov-2021 10:23]    81M with PMHX TIA 2006, Guillian [place] 2006 s/p flu vaccine (baseline gait imbalance per wife), HTN, DM now insulin dependant, cholecystectomy and hernia repair surgery x2 presented with complaints of right facial droop and dysarthria.  Patient's wife states that his Last known well was around 2 pm when he went for a nap.  She noticed around 4pm after he had woke up, he had slight right facial and mild dysarthria.. NIH on arrival was a 3. /84 on arrival. 10mg IVP hydralazine given. CTH negative for intracranial hemorrhage. CTP: no core infarct/ no penumbra. CTA: negative for LVO. TPA was administered at 1813. Patient admitted to NSICU     Problem/Plan - 2:  ·  Problem: Accelerated hypertension.   ·  Plan: Coreg, lisinopril and Norvasc. 81 year old male presenting to [facility] on 10/30/2021 with slurred speech and facial droop.  Vital signs on 10/30/2021 show /84 and 199/84, treated with IV Hydralzine.  The 10/30/2021 ED Provider Note documents "Hypertension requiring aggressive control with IV medications".  The 11/1/2021 Hospitalist Progress Note documents "Accelerated hypertension".  The Discharge Note documents ".../84 on arrival. 10mg IVP hydralazine given."    Please clarify a diagnosis could be associated with the above findings.    A)  Hypertensive Emergency  B)  Other, please specify  C)  Not clinically significant      Supporting Documentation:       ,,ED ADULT Flow Sheet [Charted Location: [facility] ER 1606 06] [Authored: 30-Oct-2021 18:04]- for Visit: [id number],  Mary Roper (RN); Ksenia Herrera (RN), Complete, Revised, Signed in Full, General  Vital Signs  Heart Rate  Heart Rate Heart Rate (beats/min): 65 /min  Heart Rate Method Method: cardiac monitor  Noninvasive Blood Pressure  BP Systolic Systolic: 187 mm Hg  BP Diastolic Diastolic (mm Hg): 84 mm Hg    ,,ED ADULT Flow Sheet [Charted Location: [facility] ED] [Authored: 30-Oct-2021 18:07]- for Visit: [id number],  Mary Roper (RN), Complete, Entered, Signed in Full, General  Vital Signs  Heart Rate  Heart Rate Heart Rate (beats/min): 66 /min  Heart Rate Method Method: cardiac monitor  Noninvasive Blood Pressure  BP Systolic Systolic: 199 mm Hg  BP Diastolic Diastolic (mm Hg): 84 mm Hg      ED Provider Note [Charted Location: [address] 3108 01] [Authored: 30-Oct-2021 18:29]  Hypertension requiring aggressive control with IV medications.      Progress Note Adult-Hospitalist Attending [Charted Location: [place] [address] 01] [Authored: 01-Nov-2021 10:23]    81M with PMHX TIA 2006, Guillian [place] 2006 s/p flu vaccine (baseline gait imbalance per wife), HTN, DM now insulin dependant, cholecystectomy and hernia repair surgery x2 presented with complaints of right facial droop and dysarthria.  Patient's wife states that his Last known well was around 2 pm when he went for a nap.  She noticed around 4pm after he had woke up, he had slight right facial and mild dysarthria.. NIH on arrival was a 3. /84 on arrival. 10mg IVP hydralazine given. CTH negative for intracranial hemorrhage. CTP: no core infarct/ no penumbra. CTA: negative for LVO. TPA was administered at 1813. Patient admitted to NSICU     Problem/Plan - 2:  ·  Problem: Accelerated hypertension.   ·  Plan: Coreg, lisinopril and Norvasc.    Discharge Note Provider [Charted Location: [facility] 3TWR 3202 02] [Authored: 02-Nov-2021 11:10]  Patient arrived to [facility] as CODE stroke. NIH on arrival was a 3. /84 on arrival. 10mg IVP hydralazine given. CTH negative for intracranial hemorrhage. CTP: no core infarct/ no penumbra. CTA: negative for LVO. TPA was administered at 1813. Patient admitted to NSICU

## 2021-11-09 ENCOUNTER — NON-APPOINTMENT (OUTPATIENT)
Age: 81
End: 2021-11-09

## 2021-11-18 ENCOUNTER — APPOINTMENT (OUTPATIENT)
Dept: NEUROLOGY | Facility: CLINIC | Age: 81
End: 2021-11-18
Payer: MEDICARE

## 2021-11-18 VITALS
SYSTOLIC BLOOD PRESSURE: 166 MMHG | OXYGEN SATURATION: 96 % | TEMPERATURE: 98 F | WEIGHT: 236 LBS | HEIGHT: 70 IN | DIASTOLIC BLOOD PRESSURE: 92 MMHG | BODY MASS INDEX: 33.79 KG/M2 | HEART RATE: 74 BPM

## 2021-11-18 VITALS — SYSTOLIC BLOOD PRESSURE: 170 MMHG | DIASTOLIC BLOOD PRESSURE: 72 MMHG

## 2021-11-18 DIAGNOSIS — I10 ESSENTIAL (PRIMARY) HYPERTENSION: ICD-10-CM

## 2021-11-18 DIAGNOSIS — G61.0 OTHER COMPLICATIONS FOLLOWING IMMUNIZATION, NOT ELSEWHERE CLASSIFIED, INITIAL ENCOUNTER: ICD-10-CM

## 2021-11-18 DIAGNOSIS — Z82.3 FAMILY HISTORY OF STROKE: ICD-10-CM

## 2021-11-18 DIAGNOSIS — Z87.39 PERSONAL HISTORY OF OTHER DISEASES OF THE MUSCULOSKELETAL SYSTEM AND CONNECTIVE TISSUE: ICD-10-CM

## 2021-11-18 DIAGNOSIS — T88.1XXA OTHER COMPLICATIONS FOLLOWING IMMUNIZATION, NOT ELSEWHERE CLASSIFIED, INITIAL ENCOUNTER: ICD-10-CM

## 2021-11-18 PROCEDURE — 99214 OFFICE O/P EST MOD 30 MIN: CPT

## 2021-11-23 PROBLEM — Z82.3 FAMILY HISTORY OF CEREBROVASCULAR ACCIDENT (CVA): Status: ACTIVE | Noted: 2021-11-18

## 2021-11-23 RX ORDER — CARVEDILOL 3.12 MG/1
3.12 TABLET, FILM COATED ORAL
Refills: 0 | Status: ACTIVE | COMMUNITY

## 2021-11-23 RX ORDER — AMLODIPINE BESYLATE 5 MG/1
5 TABLET ORAL
Refills: 0 | Status: ACTIVE | COMMUNITY

## 2021-11-23 RX ORDER — LEVOTHYROXINE SODIUM 125 UG/1
125 TABLET ORAL
Refills: 0 | Status: ACTIVE | COMMUNITY

## 2021-11-23 RX ORDER — QUINAPRIL HYDROCHLORIDE 40 MG/1
40 TABLET, FILM COATED ORAL
Refills: 0 | Status: ACTIVE | COMMUNITY

## 2021-11-23 RX ORDER — ASPIRIN 81 MG/1
81 TABLET, CHEWABLE ORAL
Refills: 0 | Status: ACTIVE | COMMUNITY

## 2021-11-23 RX ORDER — ALFUZOSIN HYDROCHLORIDE 10 MG/1
10 TABLET, EXTENDED RELEASE ORAL
Refills: 0 | Status: ACTIVE | COMMUNITY

## 2021-11-23 RX ORDER — SITAGLIPTIN 100 MG/1
100 TABLET, FILM COATED ORAL
Refills: 0 | Status: ACTIVE | COMMUNITY

## 2021-11-23 RX ORDER — PANTOPRAZOLE 40 MG/1
40 TABLET, DELAYED RELEASE ORAL
Qty: 60 | Refills: 2 | Status: DISCONTINUED | COMMUNITY
Start: 2020-01-31 | End: 2021-11-23

## 2021-11-23 RX ORDER — SUCRALFATE 1 G/1
1 TABLET ORAL
Qty: 60 | Refills: 2 | Status: DISCONTINUED | COMMUNITY
Start: 2020-01-31 | End: 2021-11-23

## 2021-11-23 RX ORDER — INSULIN DEGLUDEC INJECTION 100 U/ML
100 INJECTION, SOLUTION SUBCUTANEOUS
Refills: 0 | Status: ACTIVE | COMMUNITY

## 2021-11-23 RX ORDER — DUTASTERIDE 0.5 MG/1
0.5 CAPSULE, LIQUID FILLED ORAL
Refills: 0 | Status: ACTIVE | COMMUNITY

## 2021-11-23 RX ORDER — ATORVASTATIN CALCIUM 40 MG/1
40 TABLET, FILM COATED ORAL
Refills: 0 | Status: ACTIVE | COMMUNITY

## 2021-11-23 NOTE — CONSULT LETTER
[Dear  ___] : Dear  [unfilled], [Courtesy Letter:] : I had the pleasure of seeing your patient, [unfilled], in my office today. [Please see my note below.] : Please see my note below. [Consult Closing:] : Thank you very much for allowing me to participate in the care of this patient.  If you have any questions, please do not hesitate to contact me. [Sincerely,] : Sincerely, [DrJose De Jesus  ___] : Dr. ENRIQUEZ [FreeTextEntry3] : Jennifer Bonilla NP\par [facility] [place]\par [address] [place] [address]\par Phone: [phone]; Fax: [phone]

## 2021-11-23 NOTE — DISCUSSION/SUMMARY
[Antithrombotic therapy with ___] : antithrombotic therapy with  [unfilled] [Intensive Blood Pressure Control] : intensive blood pressure control [Lipid Lowering Therapy] : lipid lowering therapy [Patient encouraged to discuss with Primary MD] : I encouraged the patient to discuss these important issues with ~his/her~ primary care doctor [Goals and Counseling] : I have reviewed the goals of stroke risk factor modification. I counseled the patient on measures to reduce stroke risk, including the importance of medication compliance, risk factor control, exercise, healthy diet and avoidance of smoking. I reviewed stroke warning signs and symptoms and appropriate actions to take if such occur. [FreeTextEntry1] : Mr. Winkler is an 81 year-old man who presented to the office today for post hospitalization follow-up. He was admitted to [facility] on 10/30/21 with acute onset speech difficulty and right-sided facial droop and hypertension. Symptoms resolved after alteplase administration. Neuroimaging demonstrated an acute lacunar infarct in the left phillip, presumably due to small vessel disease. Patient educated on the importance if risk factor management to reduce risk of secondary stroke. Will reach out to Dr. Marie to obtain past ILR records. Patient instructed to follow-up with PCP and cardiology for blood pressure, cholesterol and glucose management.  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 Continue ASA, statin therapy, antihypertensive medications and antidiabetic agents as ordered. Follow-up with me in three months or sooner should the need arise. All of their questions and concerns were addressed.

## 2021-11-23 NOTE — PHYSICAL EXAM
[FreeTextEntry1] : GENERAL PHYSICAL EXAM:\par GEN: no distress, normal affect\par HEENT: NCAT, OP clear\par EYES: sclera white, conjunctiva clear, no nystagmus\par NECK: supple\par CV: normal rhythm\par PULM: no respiratory distress, normal rhythm and effort\par EXT: no edema, no cyanosis\par MSK: muscle tone and strength normal\par SKIN: warm, dry, no rash or lesion on exposed skin \par \par NEUROLOGICAL EXAM:\par Mental Status\par Orientation: alert and oriented to person, place, time, and situation, 3/3 recall after 5 minutes\par Language: clear and fluent, intact comprehension and repetition, intact naming and reading\par \par Cranial Nerves\par II: visual fields full to confrontation \par III, IV, VI: PERRL, EOMI\par V, VII: facial sensation and movement intact and symmetric \par VIII: hearing intact \par IX, X: uvula midline, soft palate elevates normally \par XI: BL shoulder shrug intact \par XII: tongue midline\par \par Motor\par Shoulder abd: 5 (R), 5 (L)\par EF/EE: 5 (R), 5 (L)\par hand : 5 (R), 5 (L)\par HF/HE: 5 (R), 5 (L)\par KF/KE: 5 (R), 5 (L)\par DF/PF: 5 (R), 5 (L) \par Tone and bulk are normal in upper and lower limbs\par No pronator drift\par \par Sensation\par Intact to light touch in all 4 EXTs\par \par Reflex\par 2+ in BL biceps, brachioradialis, patella\par \par Coordination\par Normal FTN bilaterally\par Dysdiadochokinesia not present. \par Able to perform rapid, alternating movements\par \par Gait\par Normal stance, stride, and pivot turn\par Unable to perform Tandem walk \par Negative Romberg

## 2021-11-23 NOTE — ADDENDUM
[FreeTextEntry1] : Reached out to Dr. Marie, who placed ILR in 2016. Patient did not follow-up with him after ILR placement, therefore no additional records or cardiac readings are available.

## 2021-11-23 NOTE — HISTORY OF PRESENT ILLNESS
[FreeTextEntry1] : PCP: Dr. Ajay Medley\par Cardio: Dr. Ashkan Quintero\par \par Mr. Winkler is an 81 year-old man with PMH GBS, HTN and DM2 who presented to the ED at [facility] on 10/30/21 with acute onset speech difficulty and right-sided facial droop. CTA head was negative, alteplase was administered and his symptoms resolved. MRI head demonstrated acute lacunar infarct in the left phillip. Hbg A1C 7.3%, non-. He had long-term cardiac monitoring before in 2016 after TIA, he states that a.fib was detected, however there is no evidence of this on available uploaded ILR records. He reports occasional headache, blurry vision, tingling in bilateral feet and issues with balance. He denies interval TIA or stroke-like symptoms. \par \par

## 2022-04-26 ENCOUNTER — APPOINTMENT (OUTPATIENT)
Dept: NEUROLOGY | Facility: CLINIC | Age: 82
End: 2022-04-26
Payer: MEDICARE

## 2022-04-26 VITALS
DIASTOLIC BLOOD PRESSURE: 72 MMHG | BODY MASS INDEX: 32.23 KG/M2 | WEIGHT: 238 LBS | OXYGEN SATURATION: 98 % | HEIGHT: 72 IN | SYSTOLIC BLOOD PRESSURE: 147 MMHG | TEMPERATURE: 97.4 F | HEART RATE: 68 BPM

## 2022-04-26 DIAGNOSIS — E11.9 TYPE 2 DIABETES MELLITUS W/OUT COMPLICATIONS: ICD-10-CM

## 2022-04-26 DIAGNOSIS — R42 DIZZINESS AND GIDDINESS: ICD-10-CM

## 2022-04-26 DIAGNOSIS — E78.5 HYPERLIPIDEMIA, UNSPECIFIED: ICD-10-CM

## 2022-04-26 DIAGNOSIS — I63.9 CEREBRAL INFARCTION, UNSPECIFIED: ICD-10-CM

## 2022-04-26 PROCEDURE — 99214 OFFICE O/P EST MOD 30 MIN: CPT

## 2022-04-26 RX ORDER — MECLIZINE HYDROCHLORIDE 12.5 MG/1
12.5 TABLET ORAL TWICE DAILY
Qty: 180 | Refills: 1 | Status: ACTIVE | COMMUNITY
Start: 1900-01-01 | End: 1900-01-01

## 2022-04-26 RX ORDER — SEMAGLUTIDE 1.34 MG/ML
2 INJECTION, SOLUTION SUBCUTANEOUS
Refills: 0 | Status: ACTIVE | COMMUNITY

## 2022-04-26 NOTE — CONSULT LETTER
[Dear  ___] : Dear  [unfilled], [Courtesy Letter:] : I had the pleasure of seeing your patient, [unfilled], in my office today. [Please see my note below.] : Please see my note below. [Consult Closing:] : Thank you very much for allowing me to participate in the care of this patient.  If you have any questions, please do not hesitate to contact me. [Sincerely,] : Sincerely, [DrJose De Jesus  ___] : Dr. ENRIQUEZ [FreeTextEntry3] : Jennifer Bonilla NP\par [facility] [place]\par [address] [place] [address]\par Phone: [phone]; Fax: [phone]

## 2022-04-26 NOTE — HISTORY OF PRESENT ILLNESS
[FreeTextEntry1] : PCP: Dr. Ajay Medley\par Cardio: Dr. Ashkan Quintero\par \par INTERIM HISTORY: Since his last visit, Mr. Winkler symptoms are unchanged. He reports bilateral leg heaviness and occasional dizziness with movement, relieved with meclizine. He does not engage in regular exercise. [facility] has been monitoring ILR. He was briefly put on Eliquis by cardiology after reported a.fib on ILR, but it has since been discontinued by cardiology. As per patient and his wife, there has been no recently detected arrhythmias. He reports compliance with all medications. He denies interval TIA or stroke-like symptoms. \par \par INITIAL OFFICE VISIT 11/18/21: Mr. Winkler is an 81 year-old man with PMH GBS, HTN and DM2 who presented to the ED at [facility] on 10/30/21 with acute onset speech difficulty and right-sided facial droop. CTA head was negative, alteplase was administered and his symptoms resolved. MRI head demonstrated acute lacunar infarct in the left phillip. Hbg A1C 7.3%, non-. He had long-term cardiac monitoring before in 2016 after TIA, he states that a.fib was detected, however there is no evidence of this on available uploaded ILR records. He reports occasional headache, blurry vision, tingling in bilateral feet and issues with balance. He denies interval TIA or stroke-like symptoms. \par \par

## 2022-04-26 NOTE — DISCUSSION/SUMMARY
[Antithrombotic therapy with ___] : antithrombotic therapy with  [unfilled] [Intensive Blood Pressure Control] : intensive blood pressure control [Lipid Lowering Therapy] : lipid lowering therapy [Patient encouraged to discuss with Primary MD] : I encouraged the patient to discuss these important issues with ~his/her~ primary care doctor [Goals and Counseling] : I have reviewed the goals of stroke risk factor modification. I counseled the patient on measures to reduce stroke risk, including the importance of medication compliance, risk factor control, exercise, healthy diet and avoidance of smoking. I reviewed stroke warning signs and symptoms and appropriate actions to take if such occur. [FreeTextEntry1] : Mr. Winkler is an 81 year-old man who presented to the office today for post hospitalization follow-up. He was admitted to [facility] on 10/30/21 with acute onset speech difficulty and right-sided facial droop and hypertension. Symptoms resolved after alteplase administration. Neuroimaging demonstrated an acute lacunar infarct in the left phillip, presumably due to small vessel disease. Patient educated on the importance if risk factor management to reduce risk of secondary stroke. Patient instructed to follow-up with PCP and cardiology for blood pressure, cholesterol and glucose management.  Refill provided for meclizine for symptoms of vertigo. Continue ASA, statin therapy, antihypertensive medications and antidiabetic agents as ordered. Patient encouraged to participate in daily activity or physical therapy. He is not interested in starting PT at this time. Follow-up with me in six months or sooner should the need arise. All of their questions and concerns were addressed.

## 2022-04-26 NOTE — REVIEW OF SYSTEMS
[As Noted in HPI] : as noted in HPI [As noted in HPI] : as noted in HPI [Negative] : Respiratory [FreeTextEntry9] : lower extremity leg spasm

## 2022-08-01 NOTE — PHYSICAL THERAPY INITIAL EVALUATION ADULT - SITTING BALANCE: STATIC
good balance Erythromycin Counseling:  I discussed with the patient the risks of erythromycin including but not limited to GI upset, allergic reaction, drug rash, diarrhea, increase in liver enzymes, and yeast infections.

## 2022-11-01 ENCOUNTER — APPOINTMENT (OUTPATIENT)
Dept: PULMONOLOGY | Facility: CLINIC | Age: 82
End: 2022-11-01

## 2022-11-01 VITALS — WEIGHT: 224 LBS | BODY MASS INDEX: 30.38 KG/M2

## 2022-11-01 DIAGNOSIS — Z87.891 PERSONAL HISTORY OF NICOTINE DEPENDENCE: ICD-10-CM

## 2022-11-01 PROCEDURE — 99204 OFFICE O/P NEW MOD 45 MIN: CPT

## 2022-11-01 RX ORDER — MECLIZINE HYDROCHLORIDE 25 MG/1
TABLET ORAL
Refills: 0 | Status: ACTIVE | COMMUNITY

## 2022-11-01 NOTE — DISCUSSION/SUMMARY
[FreeTextEntry1] : 82-year-old male seen today for an atypical breathing pattern without symptoms.  This occurred while awake.  He does have a distant history of tobacco use and will undergo pulmonary function test.  Doubt any significant findings.  Dyspnea is most likely on the basis of deconditioning from neuromuscular disease.\par \par History of atrial fibrillation as well as TIAs to raise the spectrum of undiagnosed obstructive sleep apnea.  Patient presently though is asymptomatic and without excessive daytime somnolence.  He will obtain the results of his study for further evaluation in this office

## 2022-11-01 NOTE — HISTORY OF PRESENT ILLNESS
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 Patient is seen today for evaluation of his respiratory status.  During a urologic work-up he was noted to have some irregular respirations by his urologist.  He did not note any significant shortness of breath cough wheeze or sputum at the time.  He has no chronic complaints of cough wheeze sputum production chest pains.  He does note decreased exercise tolerance but attributes this to deconditioning from his spinal stenosis as well as prior Guillain-Barré.\par \par \par  [YearQuit] : 1980 [Awakes with Dry Mouth] : does not awaken with dry mouth [Awakes with Headache] : does not awaken with headache [Daytime Somnolence] : denies daytime somnolence [Snoring] : no snoring [Witnessed Apneas] : no witnessed apneas [TextBox_77] : 0254 [TextBox_79] : 1000 [TextBox_81] : 5 [TextBox_89] : 5 [TextBox_165] : He has had a recent sleep study at his cardiologist and is pending the results [ESS] : 3

## 2022-11-01 NOTE — CONSULT LETTER
[Dear  ___] : Dear  [unfilled], [Consult Letter:] : I had the pleasure of evaluating your patient, [unfilled]. [Please see my note below.] : Please see my note below. [Consult Closing:] : Thank you very much for allowing me to participate in the care of this patient.  If you have any questions, please do not hesitate to contact me. [Sincerely,] : Sincerely, [FreeTextEntry3] : Prince Oreilly MD FCCP\par Pulmonary/Critical Care/Sleep Medicine\par Department of Internal Medicine\par \par [employer] [facility] [employer]\par

## 2022-12-14 NOTE — ED ADULT TRIAGE NOTE - AS HEIGHT TYPE
----- Message from Sobia Santiago sent at 12/13/2022  3:05 PM CST -----  Regarding: PT Needs an order for BD Dexa Axial Skeleton  The PT reached out to Pre-service scheduling looking for a BD Dexa Axial Skeleton order an outside provider had faxed in at the beginning of the month, which was not received/entered into the chart.   After going over her options, the Pt asked to get back into contact with the PCP we had listed in her chart(DO Padua, K), to ask if they would be able to enter in that order for her, or if unable to enter for any reason, to please reach out to the patient to begin the process.    To be clear, the PT wishes to use [place] [facility] as her primary healthcare facility, and would like to be contacted as soon as possible for assistance in continuing her care.    Thank you and have a good rest of your day!     stated

## 2023-01-04 ENCOUNTER — APPOINTMENT (OUTPATIENT)
Dept: PULMONOLOGY | Facility: CLINIC | Age: 83
End: 2023-01-04
Payer: MEDICARE

## 2023-01-04 ENCOUNTER — APPOINTMENT (OUTPATIENT)
Dept: PULMONOLOGY | Facility: CLINIC | Age: 83
End: 2023-01-04

## 2023-01-04 VITALS
SYSTOLIC BLOOD PRESSURE: 130 MMHG | HEIGHT: 68 IN | WEIGHT: 228 LBS | BODY MASS INDEX: 34.56 KG/M2 | OXYGEN SATURATION: 98 % | DIASTOLIC BLOOD PRESSURE: 68 MMHG | RESPIRATION RATE: 16 BRPM | HEART RATE: 72 BPM

## 2023-01-04 VITALS — WEIGHT: 228 LBS | BODY MASS INDEX: 34.56 KG/M2 | HEIGHT: 68 IN

## 2023-01-04 DIAGNOSIS — R09.89 OTHER SPECIFIED SYMPTOMS AND SIGNS INVOLVING THE CIRCULATORY AND RESPIRATORY SYSTEMS: ICD-10-CM

## 2023-01-04 PROCEDURE — 99215 OFFICE O/P EST HI 40 MIN: CPT | Mod: 25

## 2023-01-04 PROCEDURE — 94727 GAS DIL/WSHOT DETER LNG VOL: CPT

## 2023-01-04 PROCEDURE — 94010 BREATHING CAPACITY TEST: CPT

## 2023-01-04 PROCEDURE — 85018 HEMOGLOBIN: CPT | Mod: QW

## 2023-01-04 PROCEDURE — 94729 DIFFUSING CAPACITY: CPT

## 2023-01-04 NOTE — HISTORY OF PRESENT ILLNESS
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 Patient is seen today for evaluation of his respiratory status.  During a urologic work-up he was noted to have some irregular respirations by his urologist.  He did not note any significant shortness of breath cough wheeze or sputum at the time.  He has no chronic complaints of cough wheeze sputum production chest pains.  He does note decreased exercise tolerance but attributes this to deconditioning from his spinal stenosis as well as prior Guillain-Barré.\par \par \par  [Awakes with Dry Mouth] : does not awaken with dry mouth [Awakes with Headache] : does not awaken with headache [Daytime Somnolence] : denies daytime somnolence [Snoring] : no snoring [Witnessed Apneas] : no witnessed apneas [TextBox_77] : 8818 [TextBox_79] : 1000 [TextBox_81] : 5 [TextBox_89] : 5 [TextBox_100] : 9/30/2022 [TextBox_108] : 38.9 [TextBox_120] : 46.2 %  [TextBox_165] : He has had a recent sleep study at his cardiologist and is pending the results [ESS] : 3

## 2023-01-04 NOTE — REASON FOR VISIT
[Shortness of Breath] : shortness of breath [Follow-Up] : a follow-up visit [Sleep Apnea] : sleep apnea [TextBox_13] : Ajay Huynh

## 2023-01-04 NOTE — DISCUSSION/SUMMARY
[FreeTextEntry1] : 82-year-old male seen today for complaints of dyspnea.  No evidence of intrinsic lung disease seen on pulmonary function test.  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  Questionable component of underlying cardiac disease may explain his central apneas.  We will initiate an AutoPap to treat his obstructive component and evaluate his further response to his central component

## 2023-01-04 NOTE — END OF VISIT
[Time Spent: ___ minutes] : I have spent [unfilled] minutes of time on the encounter. [FreeTextEntry3] : CPAP initiated

## 2023-01-04 NOTE — CONSULT LETTER
[Dear  ___] : Dear  [unfilled], [Consult Letter:] : I had the pleasure of evaluating your patient, [unfilled]. [Please see my note below.] : Please see my note below. [Consult Closing:] : Thank you very much for allowing me to participate in the care of this patient.  If you have any questions, please do not hesitate to contact me. [Sincerely,] : Sincerely, [FreeTextEntry3] : Prince Oreilly MD FCCP\par Pulmonary/Critical Care/Sleep Medicine\par Department of Internal Medicine\par \par [employer] [facility] [employer]\par

## 2023-02-14 NOTE — ED ADULT NURSE REASSESSMENT NOTE - NS ED NURSE REASSESS COMMENT FT1
neuro team at bedside, collaborative NIH scale done, at bedside, pt states I am just tired.  dienies other complaints resting in bed on monitor. : Yes

## 2023-04-25 RX ORDER — BACLOFEN 10 MG/1
10 TABLET ORAL 3 TIMES DAILY
Qty: 270 | Refills: 3 | Status: ACTIVE | COMMUNITY
Start: 2021-11-18 | End: 1900-01-01

## 2023-04-27 ENCOUNTER — APPOINTMENT (OUTPATIENT)
Dept: PULMONOLOGY | Facility: CLINIC | Age: 83
End: 2023-04-27
Payer: MEDICARE

## 2023-04-27 VITALS
HEART RATE: 60 BPM | OXYGEN SATURATION: 98 % | SYSTOLIC BLOOD PRESSURE: 128 MMHG | DIASTOLIC BLOOD PRESSURE: 72 MMHG | RESPIRATION RATE: 16 BRPM

## 2023-04-27 PROCEDURE — 99214 OFFICE O/P EST MOD 30 MIN: CPT

## 2023-04-27 NOTE — DISCUSSION/SUMMARY
[FreeTextEntry1] : 81-year-old male seen today for follow-up of mixed apnea.  Patient's initial compliance study demonstrated poor compliance secondary to his nasal mask.  It was noted that his study did not demonstrate any significant central apneas.\par Plan:\par 1.  Sample Ann fullface mask was fitted to the patient, large size.\par 2.  Follow-up in 6 weeks

## 2023-04-27 NOTE — REASON FOR VISIT
[Follow-Up] : a follow-up visit [Sleep Apnea] : sleep apnea [Shortness of Breath] : shortness of breath [TextBox_13] : Ajay Huynh

## 2023-04-27 NOTE — HISTORY OF PRESENT ILLNESS
[Obstructive Sleep Apnea] : obstructive sleep apnea [Awakes Unrefreshed] : awakes unrefreshed [Home] : home [APAP:] : APAP [Awakes with Dry Mouth] : does not awaken with dry mouth [Awakes with Headache] : does not awaken with headache [Daytime Somnolence] : denies daytime somnolence [Snoring] : no snoring [Witnessed Apneas] : no witnessed apneas [TextBox_77] : 4704 [TextBox_79] : 1000 [TextBox_81] : 5 [TextBox_89] : 5 [TextBox_100] : 9/30/2022 [TextBox_108] : 38.9 [TextBox_120] : 46.2 %  [TextBox_127] : 3/25/2023 [TextBox_125] : 4-16 [TextBox_129] : 4/23/2023 [TextBox_133] : 70 [TextBox_137] : 10 [TextBox_141] : 1 [TextBox_143] : 51 [TextBox_147] : 24.7 [TextBox_162] : 3/25/2023 [TextBox_165] : Apnea index 24.4, central apnea index of 0.4, 95th percentile: 15 cm H2O\par Hates Nasal mask [ESS] : 3

## 2023-04-27 NOTE — CONSULT LETTER
[Dear  ___] : Dear  [unfilled], [Consult Letter:] : I had the pleasure of evaluating your patient, [unfilled]. [Please see my note below.] : Please see my note below. [Consult Closing:] : Thank you very much for allowing me to participate in the care of this patient.  If you have any questions, please do not hesitate to contact me. [Sincerely,] : Sincerely, [FreeTextEntry3] : Prince Oreilly MD FCCP\par Pulmonary/Critical Care/Sleep Medicine\par Department of Internal Medicine\par \par [facility] [employer]\par

## 2023-06-12 ENCOUNTER — APPOINTMENT (OUTPATIENT)
Dept: PULMONOLOGY | Facility: CLINIC | Age: 83
End: 2023-06-12
Payer: MEDICARE

## 2023-06-12 VITALS
HEART RATE: 59 BPM | BODY MASS INDEX: 31.5 KG/M2 | DIASTOLIC BLOOD PRESSURE: 60 MMHG | RESPIRATION RATE: 18 BRPM | OXYGEN SATURATION: 98 % | WEIGHT: 230 LBS | HEIGHT: 71.5 IN | SYSTOLIC BLOOD PRESSURE: 102 MMHG

## 2023-06-12 DIAGNOSIS — Z71.89 OTHER SPECIFIED COUNSELING: ICD-10-CM

## 2023-06-12 PROCEDURE — 99215 OFFICE O/P EST HI 40 MIN: CPT

## 2023-06-12 NOTE — CONSULT LETTER
[Dear  ___] : Dear  [unfilled], [Consult Letter:] : I had the pleasure of evaluating your patient, [unfilled]. [Please see my note below.] : Please see my note below. [Consult Closing:] : Thank you very much for allowing me to participate in the care of this patient.  If you have any questions, please do not hesitate to contact me. [Sincerely,] : Sincerely, [FreeTextEntry3] : Prince Oreilly MD FCCP\par Pulmonary/Critical Care/Sleep Medicine\par Department of Internal Medicine\par \par [facility] [employer]\par

## 2023-06-12 NOTE — PHYSICAL EXAM
[No Acute Distress] : no acute distress [Normal Oropharynx] : normal oropharynx [III] : Mallampati Class: III [Normal Appearance] : normal appearance [Neck Circumference: ___] : neck circumference: [unfilled] [No Neck Mass] : no neck mass [Normal Rate/Rhythm] : normal rate/rhythm [Normal S1, S2] : normal s1, s2 [No Murmurs] : no murmurs [No Resp Distress] : no resp distress [Clear to Auscultation Bilaterally] : clear to auscultation bilaterally [No Abnormalities] : no abnormalities [Benign] : benign [Normal Gait] : normal gait [No Clubbing] : no clubbing [No Cyanosis] : no cyanosis [No Edema] : no edema [Normal Color/ Pigmentation] : normal color/ pigmentation [FROM] : FROM [No Focal Deficits] : no focal deficits [Oriented x3] : oriented x3 [Normal Affect] : normal affect

## 2023-06-12 NOTE — DISCUSSION/SUMMARY
[FreeTextEntry1] : 81-year-old male seen today for follow-up of a history of mixed apnea.  Patient's CPAP has resolved the central component but he continues to have significant air leak.  He is been compliant despite this.\par \par Plan:\par 1.  A sample F20 fullface medium mask was fitted to the patient\par 2.  Follow-up in 6 weeks\par 3.  Full instructions provided to the patient\par 4.  AutoSet adjusted from 15-20

## 2023-06-20 NOTE — OCCUPATIONAL THERAPY INITIAL EVALUATION ADULT - MANUAL MUSCLE TESTING RESULTS, REHAB EVAL
Impression: Exudative macular degeneration, with active choroidal neovascularization, right eye: H32.6638. Plan: Active CNVM OD  - fluid resolved, extend to 10 weeks avastin OD Avastin 1/3 OD administered today without complication. R/B/A discussed at length.  Follow-up in 10 weeks for avastin 2/3 OD 5/5 bilateral UEs throughout

## 2023-09-14 ENCOUNTER — APPOINTMENT (OUTPATIENT)
Dept: PULMONOLOGY | Facility: CLINIC | Age: 83
End: 2023-09-14
Payer: MEDICARE

## 2023-09-14 VITALS
WEIGHT: 221 LBS | HEIGHT: 71.5 IN | HEART RATE: 65 BPM | BODY MASS INDEX: 30.26 KG/M2 | DIASTOLIC BLOOD PRESSURE: 66 MMHG | RESPIRATION RATE: 16 BRPM | SYSTOLIC BLOOD PRESSURE: 110 MMHG | OXYGEN SATURATION: 98 %

## 2023-09-14 DIAGNOSIS — G47.33 OBSTRUCTIVE SLEEP APNEA (ADULT) (PEDIATRIC): ICD-10-CM

## 2023-09-14 PROCEDURE — 99214 OFFICE O/P EST MOD 30 MIN: CPT

## 2023-10-27 ENCOUNTER — INPATIENT (INPATIENT)
Facility: HOSPITAL | Age: 83
LOS: 2 days | Discharge: HOME | DRG: 69 | End: 2023-10-30
Attending: HOSPITALIST | Admitting: STUDENT IN AN ORGANIZED HEALTH CARE EDUCATION/TRAINING PROGRAM
Payer: MEDICARE

## 2023-10-27 VITALS
OXYGEN SATURATION: 98 % | RESPIRATION RATE: 18 BRPM | HEART RATE: 69 BPM | WEIGHT: 201.5 LBS | TEMPERATURE: 98 F | DIASTOLIC BLOOD PRESSURE: 94 MMHG | SYSTOLIC BLOOD PRESSURE: 195 MMHG

## 2023-10-27 DIAGNOSIS — Z98.89 OTHER SPECIFIED POSTPROCEDURAL STATES: Chronic | ICD-10-CM

## 2023-10-27 DIAGNOSIS — M48.07 SPINAL STENOSIS, LUMBOSACRAL REGION: Chronic | ICD-10-CM

## 2023-10-27 DIAGNOSIS — I63.9 CEREBRAL INFARCTION, UNSPECIFIED: ICD-10-CM

## 2023-10-27 LAB
ALBUMIN SERPL ELPH-MCNC: 4.1 G/DL — SIGNIFICANT CHANGE UP (ref 3.3–5.2)
ALBUMIN SERPL ELPH-MCNC: 4.1 G/DL — SIGNIFICANT CHANGE UP (ref 3.3–5.2)
ALP SERPL-CCNC: 58 U/L — SIGNIFICANT CHANGE UP (ref 40–120)
ALP SERPL-CCNC: 58 U/L — SIGNIFICANT CHANGE UP (ref 40–120)
ALT FLD-CCNC: 11 U/L — SIGNIFICANT CHANGE UP
ALT FLD-CCNC: 11 U/L — SIGNIFICANT CHANGE UP
ANION GAP SERPL CALC-SCNC: 11 MMOL/L — SIGNIFICANT CHANGE UP (ref 5–17)
ANION GAP SERPL CALC-SCNC: 11 MMOL/L — SIGNIFICANT CHANGE UP (ref 5–17)
APPEARANCE UR: CLEAR — SIGNIFICANT CHANGE UP
APPEARANCE UR: CLEAR — SIGNIFICANT CHANGE UP
APTT BLD: 25.8 SEC — SIGNIFICANT CHANGE UP (ref 24.5–35.6)
APTT BLD: 25.8 SEC — SIGNIFICANT CHANGE UP (ref 24.5–35.6)
AST SERPL-CCNC: 17 U/L — SIGNIFICANT CHANGE UP
AST SERPL-CCNC: 17 U/L — SIGNIFICANT CHANGE UP
BACTERIA # UR AUTO: ABNORMAL
BACTERIA # UR AUTO: ABNORMAL
BASOPHILS # BLD AUTO: 0.04 K/UL — SIGNIFICANT CHANGE UP (ref 0–0.2)
BASOPHILS # BLD AUTO: 0.04 K/UL — SIGNIFICANT CHANGE UP (ref 0–0.2)
BASOPHILS NFR BLD AUTO: 0.6 % — SIGNIFICANT CHANGE UP (ref 0–2)
BASOPHILS NFR BLD AUTO: 0.6 % — SIGNIFICANT CHANGE UP (ref 0–2)
BILIRUB SERPL-MCNC: 0.6 MG/DL — SIGNIFICANT CHANGE UP (ref 0.4–2)
BILIRUB SERPL-MCNC: 0.6 MG/DL — SIGNIFICANT CHANGE UP (ref 0.4–2)
BILIRUB UR-MCNC: NEGATIVE — SIGNIFICANT CHANGE UP
BILIRUB UR-MCNC: NEGATIVE — SIGNIFICANT CHANGE UP
BUN SERPL-MCNC: 19.1 MG/DL — SIGNIFICANT CHANGE UP (ref 8–20)
BUN SERPL-MCNC: 19.1 MG/DL — SIGNIFICANT CHANGE UP (ref 8–20)
CALCIUM SERPL-MCNC: 9.2 MG/DL — SIGNIFICANT CHANGE UP (ref 8.4–10.5)
CALCIUM SERPL-MCNC: 9.2 MG/DL — SIGNIFICANT CHANGE UP (ref 8.4–10.5)
CHLORIDE SERPL-SCNC: 101 MMOL/L — SIGNIFICANT CHANGE UP (ref 96–108)
CHLORIDE SERPL-SCNC: 101 MMOL/L — SIGNIFICANT CHANGE UP (ref 96–108)
CO2 SERPL-SCNC: 25 MMOL/L — SIGNIFICANT CHANGE UP (ref 22–29)
CO2 SERPL-SCNC: 25 MMOL/L — SIGNIFICANT CHANGE UP (ref 22–29)
COLOR SPEC: YELLOW — SIGNIFICANT CHANGE UP
COLOR SPEC: YELLOW — SIGNIFICANT CHANGE UP
CREAT SERPL-MCNC: 1.07 MG/DL — SIGNIFICANT CHANGE UP (ref 0.5–1.3)
CREAT SERPL-MCNC: 1.07 MG/DL — SIGNIFICANT CHANGE UP (ref 0.5–1.3)
DIFF PNL FLD: NEGATIVE — SIGNIFICANT CHANGE UP
DIFF PNL FLD: NEGATIVE — SIGNIFICANT CHANGE UP
EGFR: 69 ML/MIN/1.73M2 — SIGNIFICANT CHANGE UP
EGFR: 69 ML/MIN/1.73M2 — SIGNIFICANT CHANGE UP
EOSINOPHIL # BLD AUTO: 0.18 K/UL — SIGNIFICANT CHANGE UP (ref 0–0.5)
EOSINOPHIL # BLD AUTO: 0.18 K/UL — SIGNIFICANT CHANGE UP (ref 0–0.5)
EOSINOPHIL NFR BLD AUTO: 2.6 % — SIGNIFICANT CHANGE UP (ref 0–6)
EOSINOPHIL NFR BLD AUTO: 2.6 % — SIGNIFICANT CHANGE UP (ref 0–6)
EPI CELLS # UR: SIGNIFICANT CHANGE UP
EPI CELLS # UR: SIGNIFICANT CHANGE UP
GLUCOSE BLDC GLUCOMTR-MCNC: 121 MG/DL — HIGH (ref 70–99)
GLUCOSE BLDC GLUCOMTR-MCNC: 121 MG/DL — HIGH (ref 70–99)
GLUCOSE BLDC GLUCOMTR-MCNC: 123 MG/DL — HIGH (ref 70–99)
GLUCOSE BLDC GLUCOMTR-MCNC: 123 MG/DL — HIGH (ref 70–99)
GLUCOSE SERPL-MCNC: 144 MG/DL — HIGH (ref 70–99)
GLUCOSE SERPL-MCNC: 144 MG/DL — HIGH (ref 70–99)
GLUCOSE UR QL: NEGATIVE MG/DL — SIGNIFICANT CHANGE UP
GLUCOSE UR QL: NEGATIVE MG/DL — SIGNIFICANT CHANGE UP
HCT VFR BLD CALC: 37.4 % — LOW (ref 39–50)
HCT VFR BLD CALC: 37.4 % — LOW (ref 39–50)
HGB BLD-MCNC: 12.8 G/DL — LOW (ref 13–17)
HGB BLD-MCNC: 12.8 G/DL — LOW (ref 13–17)
HYALINE CASTS # UR AUTO: ABNORMAL /LPF
HYALINE CASTS # UR AUTO: ABNORMAL /LPF
IMM GRANULOCYTES NFR BLD AUTO: 0.3 % — SIGNIFICANT CHANGE UP (ref 0–0.9)
IMM GRANULOCYTES NFR BLD AUTO: 0.3 % — SIGNIFICANT CHANGE UP (ref 0–0.9)
INR BLD: 1.02 RATIO — SIGNIFICANT CHANGE UP (ref 0.85–1.18)
INR BLD: 1.02 RATIO — SIGNIFICANT CHANGE UP (ref 0.85–1.18)
KETONES UR-MCNC: NEGATIVE — SIGNIFICANT CHANGE UP
KETONES UR-MCNC: NEGATIVE — SIGNIFICANT CHANGE UP
LEUKOCYTE ESTERASE UR-ACNC: ABNORMAL
LEUKOCYTE ESTERASE UR-ACNC: ABNORMAL
LYMPHOCYTES # BLD AUTO: 2.23 K/UL — SIGNIFICANT CHANGE UP (ref 1–3.3)
LYMPHOCYTES # BLD AUTO: 2.23 K/UL — SIGNIFICANT CHANGE UP (ref 1–3.3)
LYMPHOCYTES # BLD AUTO: 32.7 % — SIGNIFICANT CHANGE UP (ref 13–44)
LYMPHOCYTES # BLD AUTO: 32.7 % — SIGNIFICANT CHANGE UP (ref 13–44)
MCHC RBC-ENTMCNC: 31.1 PG — SIGNIFICANT CHANGE UP (ref 27–34)
MCHC RBC-ENTMCNC: 31.1 PG — SIGNIFICANT CHANGE UP (ref 27–34)
MCHC RBC-ENTMCNC: 34.2 GM/DL — SIGNIFICANT CHANGE UP (ref 32–36)
MCHC RBC-ENTMCNC: 34.2 GM/DL — SIGNIFICANT CHANGE UP (ref 32–36)
MCV RBC AUTO: 91 FL — SIGNIFICANT CHANGE UP (ref 80–100)
MCV RBC AUTO: 91 FL — SIGNIFICANT CHANGE UP (ref 80–100)
MONOCYTES # BLD AUTO: 0.65 K/UL — SIGNIFICANT CHANGE UP (ref 0–0.9)
MONOCYTES # BLD AUTO: 0.65 K/UL — SIGNIFICANT CHANGE UP (ref 0–0.9)
MONOCYTES NFR BLD AUTO: 9.5 % — SIGNIFICANT CHANGE UP (ref 2–14)
MONOCYTES NFR BLD AUTO: 9.5 % — SIGNIFICANT CHANGE UP (ref 2–14)
NEUTROPHILS # BLD AUTO: 3.71 K/UL — SIGNIFICANT CHANGE UP (ref 1.8–7.4)
NEUTROPHILS # BLD AUTO: 3.71 K/UL — SIGNIFICANT CHANGE UP (ref 1.8–7.4)
NEUTROPHILS NFR BLD AUTO: 54.3 % — SIGNIFICANT CHANGE UP (ref 43–77)
NEUTROPHILS NFR BLD AUTO: 54.3 % — SIGNIFICANT CHANGE UP (ref 43–77)
NITRITE UR-MCNC: NEGATIVE — SIGNIFICANT CHANGE UP
NITRITE UR-MCNC: NEGATIVE — SIGNIFICANT CHANGE UP
PH UR: 5 — SIGNIFICANT CHANGE UP (ref 5–8)
PH UR: 5 — SIGNIFICANT CHANGE UP (ref 5–8)
PLATELET # BLD AUTO: 145 K/UL — LOW (ref 150–400)
PLATELET # BLD AUTO: 145 K/UL — LOW (ref 150–400)
POTASSIUM SERPL-MCNC: 4.1 MMOL/L — SIGNIFICANT CHANGE UP (ref 3.5–5.3)
POTASSIUM SERPL-MCNC: 4.1 MMOL/L — SIGNIFICANT CHANGE UP (ref 3.5–5.3)
POTASSIUM SERPL-SCNC: 4.1 MMOL/L — SIGNIFICANT CHANGE UP (ref 3.5–5.3)
POTASSIUM SERPL-SCNC: 4.1 MMOL/L — SIGNIFICANT CHANGE UP (ref 3.5–5.3)
PROT SERPL-MCNC: 6.5 G/DL — LOW (ref 6.6–8.7)
PROT SERPL-MCNC: 6.5 G/DL — LOW (ref 6.6–8.7)
PROT UR-MCNC: NEGATIVE — SIGNIFICANT CHANGE UP
PROT UR-MCNC: NEGATIVE — SIGNIFICANT CHANGE UP
PROTHROM AB SERPL-ACNC: 11.3 SEC — SIGNIFICANT CHANGE UP (ref 9.5–13)
PROTHROM AB SERPL-ACNC: 11.3 SEC — SIGNIFICANT CHANGE UP (ref 9.5–13)
RBC # BLD: 4.11 M/UL — LOW (ref 4.2–5.8)
RBC # BLD: 4.11 M/UL — LOW (ref 4.2–5.8)
RBC # FLD: 13.2 % — SIGNIFICANT CHANGE UP (ref 10.3–14.5)
RBC # FLD: 13.2 % — SIGNIFICANT CHANGE UP (ref 10.3–14.5)
RBC CASTS # UR COMP ASSIST: ABNORMAL /HPF (ref 0–4)
RBC CASTS # UR COMP ASSIST: ABNORMAL /HPF (ref 0–4)
SODIUM SERPL-SCNC: 137 MMOL/L — SIGNIFICANT CHANGE UP (ref 135–145)
SODIUM SERPL-SCNC: 137 MMOL/L — SIGNIFICANT CHANGE UP (ref 135–145)
SP GR SPEC: 1.01 — SIGNIFICANT CHANGE UP (ref 1.01–1.02)
SP GR SPEC: 1.01 — SIGNIFICANT CHANGE UP (ref 1.01–1.02)
TROPONIN T SERPL-MCNC: <0.01 NG/ML — SIGNIFICANT CHANGE UP (ref 0–0.06)
TROPONIN T SERPL-MCNC: <0.01 NG/ML — SIGNIFICANT CHANGE UP (ref 0–0.06)
UROBILINOGEN FLD QL: NEGATIVE MG/DL — SIGNIFICANT CHANGE UP
UROBILINOGEN FLD QL: NEGATIVE MG/DL — SIGNIFICANT CHANGE UP
WBC # BLD: 6.83 K/UL — SIGNIFICANT CHANGE UP (ref 3.8–10.5)
WBC # BLD: 6.83 K/UL — SIGNIFICANT CHANGE UP (ref 3.8–10.5)
WBC # FLD AUTO: 6.83 K/UL — SIGNIFICANT CHANGE UP (ref 3.8–10.5)
WBC # FLD AUTO: 6.83 K/UL — SIGNIFICANT CHANGE UP (ref 3.8–10.5)
WBC UR QL: SIGNIFICANT CHANGE UP /HPF (ref 0–5)
WBC UR QL: SIGNIFICANT CHANGE UP /HPF (ref 0–5)

## 2023-10-27 PROCEDURE — 0042T: CPT | Mod: MA

## 2023-10-27 PROCEDURE — 93010 ELECTROCARDIOGRAM REPORT: CPT

## 2023-10-27 PROCEDURE — 99291 CRITICAL CARE FIRST HOUR: CPT

## 2023-10-27 PROCEDURE — 70496 CT ANGIOGRAPHY HEAD: CPT | Mod: 26,MA

## 2023-10-27 PROCEDURE — 70450 CT HEAD/BRAIN W/O DYE: CPT | Mod: 26,MA,XU

## 2023-10-27 PROCEDURE — 70498 CT ANGIOGRAPHY NECK: CPT | Mod: 26,MA

## 2023-10-27 PROCEDURE — 99223 1ST HOSP IP/OBS HIGH 75: CPT

## 2023-10-27 PROCEDURE — 71045 X-RAY EXAM CHEST 1 VIEW: CPT | Mod: 26

## 2023-10-27 RX ORDER — ATORVASTATIN CALCIUM 80 MG/1
80 TABLET, FILM COATED ORAL AT BEDTIME
Refills: 0 | Status: DISCONTINUED | OUTPATIENT
Start: 2023-10-27 | End: 2023-10-29

## 2023-10-27 RX ORDER — INSULIN DEGLUDEC 100 U/ML
10 INJECTION, SOLUTION SUBCUTANEOUS
Qty: 0 | Refills: 0 | DISCHARGE

## 2023-10-27 RX ORDER — BACLOFEN 100 %
1 POWDER (GRAM) MISCELLANEOUS
Refills: 0 | DISCHARGE

## 2023-10-27 RX ORDER — ACETAMINOPHEN 500 MG
650 TABLET ORAL EVERY 6 HOURS
Refills: 0 | Status: DISCONTINUED | OUTPATIENT
Start: 2023-10-27 | End: 2023-10-30

## 2023-10-27 RX ORDER — DUTASTERIDE 0.5 MG/1
1 CAPSULE, LIQUID FILLED ORAL
Refills: 0 | DISCHARGE

## 2023-10-27 RX ORDER — ASPIRIN/CALCIUM CARB/MAGNESIUM 324 MG
325 TABLET ORAL DAILY
Refills: 0 | Status: DISCONTINUED | OUTPATIENT
Start: 2023-10-27 | End: 2023-10-27

## 2023-10-27 RX ORDER — ASPIRIN/CALCIUM CARB/MAGNESIUM 324 MG
1 TABLET ORAL
Refills: 0 | DISCHARGE

## 2023-10-27 RX ORDER — DULOXETINE HYDROCHLORIDE 30 MG/1
1 CAPSULE, DELAYED RELEASE ORAL
Refills: 0 | DISCHARGE

## 2023-10-27 RX ORDER — CARVEDILOL PHOSPHATE 80 MG/1
1 CAPSULE, EXTENDED RELEASE ORAL
Refills: 0 | DISCHARGE

## 2023-10-27 RX ORDER — ONDANSETRON 8 MG/1
4 TABLET, FILM COATED ORAL EVERY 8 HOURS
Refills: 0 | Status: DISCONTINUED | OUTPATIENT
Start: 2023-10-27 | End: 2023-10-30

## 2023-10-27 RX ORDER — BACLOFEN 100 %
1 POWDER (GRAM) MISCELLANEOUS
Qty: 0 | Refills: 0 | DISCHARGE

## 2023-10-27 RX ORDER — SITAGLIPTIN 50 MG/1
1 TABLET, FILM COATED ORAL
Qty: 0 | Refills: 0 | DISCHARGE

## 2023-10-27 RX ORDER — QUINAPRIL HYDROCHLORIDE 40 MG/1
1 TABLET, FILM COATED ORAL
Qty: 0 | Refills: 0 | DISCHARGE

## 2023-10-27 RX ORDER — LEVOTHYROXINE SODIUM 125 MCG
1 TABLET ORAL
Qty: 0 | Refills: 0 | DISCHARGE

## 2023-10-27 RX ORDER — LANOLIN ALCOHOL/MO/W.PET/CERES
3 CREAM (GRAM) TOPICAL AT BEDTIME
Refills: 0 | Status: DISCONTINUED | OUTPATIENT
Start: 2023-10-27 | End: 2023-10-30

## 2023-10-27 RX ORDER — AMLODIPINE BESYLATE 2.5 MG/1
1 TABLET ORAL
Refills: 0 | DISCHARGE

## 2023-10-27 RX ORDER — ATORVASTATIN CALCIUM 80 MG/1
1 TABLET, FILM COATED ORAL
Qty: 0 | Refills: 0 | DISCHARGE

## 2023-10-27 RX ORDER — AMLODIPINE BESYLATE 2.5 MG/1
1 TABLET ORAL
Qty: 0 | Refills: 0 | DISCHARGE

## 2023-10-27 RX ORDER — ASPIRIN/CALCIUM CARB/MAGNESIUM 324 MG
81 TABLET ORAL ONCE
Refills: 0 | Status: COMPLETED | OUTPATIENT
Start: 2023-10-27 | End: 2023-10-27

## 2023-10-27 RX ORDER — DUTASTERIDE 0.5 MG/1
1 CAPSULE, LIQUID FILLED ORAL
Qty: 0 | Refills: 0 | DISCHARGE

## 2023-10-27 RX ORDER — EZETIMIBE 10 MG/1
1 TABLET ORAL
Refills: 0 | DISCHARGE

## 2023-10-27 RX ORDER — ASPIRIN/CALCIUM CARB/MAGNESIUM 324 MG
325 TABLET ORAL ONCE
Refills: 0 | Status: COMPLETED | OUTPATIENT
Start: 2023-10-27 | End: 2023-10-27

## 2023-10-27 RX ORDER — ALFUZOSIN HYDROCHLORIDE 10 MG/1
1 TABLET, EXTENDED RELEASE ORAL
Refills: 0 | DISCHARGE

## 2023-10-27 RX ADMIN — Medication 81 MILLIGRAM(S): at 22:00

## 2023-10-27 NOTE — ED PROVIDER NOTE - CARE PLAN
1 Principal Discharge DX:	Dysarthria due to acute stroke   Principal Discharge DX:	Acute cerebrovascular accident (CVA)

## 2023-10-27 NOTE — H&P ADULT - NSICDXPASTMEDICALHX_GEN_ALL_CORE_FT
PAST MEDICAL HISTORY:  Diabetes     Guillain Barré syndrome diagnosed oct 2015 treated at [facility]; under care of neurologist.    High cholesterol     Hypertension

## 2023-10-27 NOTE — ED ADULT TRIAGE NOTE - CHIEF COMPLAINT QUOTE
Pt presents w/ acute onset altered mental status. LKW 6pm. Per wife, they were on their way to a restaurant when pt suddenly became confused. Pt. A&Ox1 on arrival. Unable to repeat simple phrases. Having difficulty with word finding. Incomprehensible speech. Code Stroke activated. Pt. brought to CT scan.

## 2023-10-27 NOTE — PHARMACOTHERAPY INTERVENTION NOTE - COMMENTS
Spoke to family at bedside to obtain medication list. On ASA 81 mG once daily, last dose last night. Outpatient Medication Review updated.    HOME MEDICATIONS:  alfuzosin 10 mg oral tablet, extended release: 1 tab(s) orally once a day (27 Oct 2023 20:27)  amLODIPine 5 mg oral tablet: 1 tab(s) orally once a day (27 Oct 2023 20:27)  aspirin 81 mg oral tablet: 1 tab(s) orally once a day (27 Oct 2023 20:27)  baclofen 10 mg oral tablet: 1 tab(s) orally 3 times a day as needed for  muscle spasm (27 Oct 2023 20:27)  carvedilol 3.125 mg oral tablet: 1 tab(s) orally 2 times a day (27 Oct 2023 20:27)  DULoxetine 20 mg oral delayed release capsule: 1 cap(s) orally once a day (27 Oct 2023 20:27)  dutasteride 0.5 mg oral capsule: 1 cap(s) orally once a day (27 Oct 2023 20:27)  enalapril 20 mg oral tablet: 1 tab(s) orally once a day (27 Oct 2023 20:27)  ezetimibe 10 mg oral tablet: 1 tab(s) orally once a day (at bedtime) (27 Oct 2023 20:27)  semaglutide 1 mg/0.5 mL (1 mg dose) subcutaneous solution: 1 milligram(s) subcutaneously once a week (Sundays) (27 Oct 2023 20:27)  
Stroke

## 2023-10-27 NOTE — ED PROVIDER NOTE - ATTENDING CONTRIBUTION TO CARE
83y M w/ hx TIA, HTN, DM, HLD, GBS; presents for AMS. Per family, pt had sudden onset of confusion at 6 PM. Per wife, pt was repeating sentences and not making sense. They were concerned due to prior hx of TIA. Pt denies other complaints. On exam, pt with mild expressive aphasia; NIHSS of 1. Hypertensive to 190/90 on arrival. CT/CTA/CTP without acute findings. Case discussed with on-call stroke neurologist Dr. Elizondo; he advises against TNK due to relatively mild symptoms. Family agreeable to plan. Will give ASA and admit to medicine.

## 2023-10-27 NOTE — H&P ADULT - HISTORY OF PRESENT ILLNESS
82 yo M PMHx HTN, HLD, Hx of TIA with presents with dysarthria since 1 pm on 1/27. Denies any other focal neurological findings. Endorses headache. No chest pain, no palpitations, no chest pain. Endorses compliance with medications. Endorses continued dysarthria during this interview. 82 yo M PMHx HTN, HLD, Hx of TIA who presents with dysarthria since 1 pm on 1/27. Per patient, he and his wife were about to go out when pt began having dysarthria (pt was having difficulty finding words). Denies any other focal neurological findings. Endorses headache. No chest pain, no palpitations, no chest pain. Endorses compliance with medications. Endorses continued dysarthria during this interview. No slurred speach, no weakness, no SOB, no chest pain, no fever or chills.     In the ED, code stroke was activated. CTH and CTA head and neck were normal. Given NIH score was 1, no TPA was advised.

## 2023-10-27 NOTE — ED PROVIDER NOTE - NIH STROKE SCALE: 4. FACIAL PALSY, QM
It was good to see you today. Thank you for coming in. We can try discontinuing the MS Contin (long-acting morphine) to see if pain remains managed. Continue oxycodone as needed for pain control, continue medical marijuana (MMJ) products for pain and other symptoms. If diarrhea is an issue:  Try Banatrol (banana flakes). Use as directed / as-needed for diarrhea. This is something you may safely take every day. If you become constipated you may wish to stop using it, although it can treat both constipation and diarrhea in some patients. This available over-the-counter at some pharmacies, but you may have more success looking online ([email or web address]. [employer]). Stay hydrated! If needed, it is okay to take Imodium for diarrhea. Use as directed, available over-the-counter. Return in about 3 months (around 11/24/2023). Call us for refills on medications that we supply, as needed. If something changes and you need to come in sooner, please call our office. PRESCRIPTION REFILL REMINDER:  All medication refills should be requested prior to [facility] on Friday. Any refill requests after noon on Friday would be addressed the following Monday. MEDICATION SAFETY ISSUES:  Do not drive under the influence of narcotics (including opioids), watch for adverse effects including confusion / altered mental status / respiratory depression (slowed breathing), keep medications stored in a safe/locked environment, do not use alcohol while opioids or other narcotics are in your system. (0) Normal symmetrical movements

## 2023-10-27 NOTE — H&P ADULT - ASSESSMENT
84 yo M PMHx HTN, HLD, Hx of TIA with presents with dysarthria since 1 pm on 1/27. Denies any other focal neurological findings.    #CVA versus TIA  ED spoke with Neurology and NIH score was 1, no TPA administered  s/p aspirin 81 mg  ordered atorvastatin  Admit to telemetry q4h neurochecks  permissive HTN  CTH and CTA head and neck negative   MRI ordered, follow up  passed bedside swallow   Speech and swallow for dysarthria    #HTN  #HLD  Permissive HTN  treat if systolic above 180, goal is systolic 160-180  home enalapril dose 20 mg given, pt vomited right after administering, ordered another 20 mg   restart home amlodipine for later today  restart carvedilol 3.125 mg BID for later today  start atorvastatin 80 mg (pt on ezetimbe at home)    #DM2  on home semaglutide  ISS and POC glucose  A1c      DVT ppx: SCDs  Diet: DASH diet 84 yo M PMHx HTN, HLD, Hx of TIA with presents with dysarthria since 1 pm on 1/27. Denies any other focal neurological findings.    #CVA versus TIA  ED spoke with Neurology and NIH score was 1, no TPA administered  s/p aspirin 81 mg  ordered atorvastatin  Admit to telemetry q4h neurochecks  permissive HTN  CTH and CTA head and neck negative   MRI ordered, follow up  passed bedside swallow   Speech and swallow for dysarthria    #HTN  #HLD  Permissive HTN  treat if systolic above 180, goal is systolic 160-180  home enalapril dose 20 mg given, pt vomited right after administering, ordered another 20 mg   restart home amlodipine for later today  restart carvedilol 3.125 mg BID for later today  start atorvastatin 80 mg (pt on ezetimbe at home)    #DM2  on home semaglutide  ISS and POC glucose  A1c    #BPH   restart home meds       DVT ppx: SCDs  Diet: DASH diet

## 2023-10-27 NOTE — ED ADULT NURSE NOTE - OBJECTIVE STATEMENT
Assumed care of pt at 1930 in [facility]. Pt A&Ox1 disoriented to time place and situation and walked in [facility] from home for altered mental status. Pt appears confused and stating "I cant remember." Code Stroke called, MD Hi at bedside. Resp are even and unlabored on RA, pt denies dizziness, smile intact and all other neuro is WDL. Assumed care of pt at 1930 in [facility]. Pt A&Ox1 disoriented to time place and situation and walked in [facility] from home for altered mental status. LKW 18:00 then pt appears confused and stating "I cant remember." Pt and family were out to dinner and pt was having word finding difficulty and unable to get complete speech out. Code Stroke called, MD Hi and MD Patel at bedside. Resp are even and unlabored on RA, pt isnt in distress, pt denies dizziness, smile intact and all other neuro is WDL. NIH is 1.

## 2023-10-27 NOTE — ED PROVIDER NOTE - CLINICAL SUMMARY MEDICAL DECISION MAKING FREE TEXT BOX
83M hx TIA, HTN, IDDM presents with acute onset mild dysarthria at 6pm. Code stroke activated and did not demonstrate acute findings. NIHSS 1 for dysarthria. Spoke with stroke neurologist on call and decision made to not administer TNK due to mild nondisabling symptoms. Will give dose of ASA and admit to stroke unit 83M hx TIA, HTN, IDDM presents with acute onset mild word finding difficulty at 6pm. Code stroke activated and did not demonstrate acute findings. NIHSS 1 for aphasia. Spoke with stroke neurologist on call and decision made to not administer TNK due to mild nondisabling symptoms. Will give dose of ASA and admit to stroke unit

## 2023-10-27 NOTE — H&P ADULT - NSHPPHYSICALEXAM_GEN_ALL_CORE
T(C): 36.7 (10-27-23 @ 19:41), Max: 36.8 (10-27-23 @ 19:20)  HR: 65 (10-28-23 @ 00:57) (59 - 69)  BP: 189/80 (10-28-23 @ 00:57) (189/80 - 195/94)  RR: 16 (10-27-23 @ 19:41) (16 - 18)  SpO2: 99% (10-27-23 @ 19:41) (98% - 99%)    GENERAL: patient appears well, no acute distress, appropriate, pleasant  EYES: sclera clear, no exudates  ENMT: oropharynx clear without erythema, no exudates, moist mucous membranes  NECK: supple, soft, no thyromegaly noted  LUNGS: good air entry bilaterally, clear to auscultation, symmetric breath sounds, no wheezing or rhonchi appreciated  HEART: soft S1/S2, regular rate and rhythm, no murmurs noted, no lower extremity edema  GASTROINTESTINAL: abdomen is soft, nontender, nondistended, normoactive bowel sounds, no palpable masses  INTEGUMENT: good skin turgor, warm skin, appears well perfused  MUSCULOSKELETAL: no clubbing or cyanosis, no obvious deformity  NEUROLOGIC: awake, alert, oriented x3, good muscle tone in 4 extremities, mild dysarthria noted   PSYCHIATRIC: mood is good, affect is congruent, linear and logical thought process  HEME/LYMPH: no palpable supraclavicular nodules, no obvious ecchymosis or petechiae T(C): 36.7 (10-27-23 @ 19:41), Max: 36.8 (10-27-23 @ 19:20)  HR: 65 (10-28-23 @ 00:57) (59 - 69)  BP: 189/80 (10-28-23 @ 00:57) (189/80 - 195/94)  RR: 16 (10-27-23 @ 19:41) (16 - 18)  SpO2: 99% (10-27-23 @ 19:41) (98% - 99%)    GENERAL: patient appears well, no acute distress, appropriate, pleasant  EYES: sclera clear, no exudates  ENMT: oropharynx clear without erythema, no exudates, moist mucous membranes  NECK: supple, soft, no thyromegaly noted  LUNGS: good air entry bilaterally, clear to auscultation, symmetric breath sounds, no wheezing or rhonchi appreciated  HEART: soft S1/S2, regular rate and rhythm, no murmurs noted, no lower extremity edema  GASTROINTESTINAL: abdomen is soft, nontender, nondistended, normoactive bowel sounds, no palpable masses  INTEGUMENT: good skin turgor, warm skin, appears well perfused  MUSCULOSKELETAL: no clubbing or cyanosis, no obvious deformity  NEUROLOGIC: awake, alert, oriented x3, good muscle tone in 4 extremities, dysarthria + (difficulty finding words)  PSYCHIATRIC: mood is good, affect is congruent, linear and logical thought process  HEME/LYMPH: no palpable supraclavicular nodules, no obvious ecchymosis or petechiae

## 2023-10-27 NOTE — PATIENT PROFILE ADULT - FALL HARM RISK - HARM RISK INTERVENTIONS
Assistance with ambulation/Assistance OOB with selected safe patient handling equipment/Communicate Risk of Fall with Harm to all staff/Discuss with provider need for PT consult/Monitor gait and stability/Reinforce activity limits and safety measures with patient and family/Tailored Fall Risk Interventions/Visual Cue: Yellow wristband and red socks/Bed in lowest position, wheels locked, appropriate side rails in place/Call bell, personal items and telephone in reach/Instruct patient to call for assistance before getting out of bed or chair/Non-slip footwear when patient is out of bed/[place] to call system/Physically safe environment - no spills, clutter or unnecessary equipment/Purposeful Proactive Rounding/Room/bathroom lighting operational, light cord in reach

## 2023-10-27 NOTE — ED PROVIDER NOTE - OBJECTIVE STATEMENT
83M hx TIA, HTN, IDDM presents with acute onset difficulty speaking at 6pm today. Per wife at bedside, pt and wife were going out when he suddenly began with word finding difficulty. He has had similar sx in the past in setting of his TIAs. No slurred speech, confusion, weakness, paresthesias, HA, neck pain, CP, SOB, or recent fever/chills. Has been able to ambulate normally. Code stroke activated

## 2023-10-27 NOTE — ED PROVIDER NOTE - PHYSICAL EXAMINATION
General: Awake, alert, lying in bed in NAD. Concerned appearing  HEENT: Normocephalic, atraumatic. No scleral icterus or conjunctival injection. EOMI. Moist mucous membranes. Oropharynx clear.   Neck:. Soft and supple.  Cardiac: RRR, Peripheral pulses 2+ and symmetric. No LE edema.  Resp: Lungs CTAB. No accessory muscle use  Abd: Soft, non-tender, non-distended. No guarding, rebound, or rigidity.  Back: Spine midline and non-tender.   Skin: No rashes, abrasions, or lacerations.  Neuro: AO x 4. Mild word finding difficulty, otherwise CN II-XII intact. 5/5 strength in all extremities. No dysmetria on finger to nose. Nml gait  Psych: Appropriate mood and affect

## 2023-10-27 NOTE — ED PROVIDER NOTE - NSICDXPASTMEDICALHX_GEN_ALL_CORE_FT
PAST MEDICAL HISTORY:  Diabetes     Guillain Barré syndrome diagnosed oct 2015 treated at [facility]; under care of neurologist.    High cholesterol     Hypertension

## 2023-10-27 NOTE — PATIENT PROFILE ADULT - MEDICATIONS/VISITS
Tramadol      Last Written Prescription Date:  6/8/2022  Last Fill Quantity: 120,   # refills: 0  Last Office Visit: 9/28/2020  Future Office visit:       Routing refill request to provider for review/approval because:  Drug not on the [facility], [facility] or  Health refill protocol or controlled substance    Yobany Sue RN   no

## 2023-10-28 LAB
A1C WITH ESTIMATED AVERAGE GLUCOSE RESULT: 6.4 % — HIGH (ref 4–5.6)
A1C WITH ESTIMATED AVERAGE GLUCOSE RESULT: 6.4 % — HIGH (ref 4–5.6)
ANION GAP SERPL CALC-SCNC: 9 MMOL/L — SIGNIFICANT CHANGE UP (ref 5–17)
ANION GAP SERPL CALC-SCNC: 9 MMOL/L — SIGNIFICANT CHANGE UP (ref 5–17)
BUN SERPL-MCNC: 18.6 MG/DL — SIGNIFICANT CHANGE UP (ref 8–20)
BUN SERPL-MCNC: 18.6 MG/DL — SIGNIFICANT CHANGE UP (ref 8–20)
CALCIUM SERPL-MCNC: 8.8 MG/DL — SIGNIFICANT CHANGE UP (ref 8.4–10.5)
CALCIUM SERPL-MCNC: 8.8 MG/DL — SIGNIFICANT CHANGE UP (ref 8.4–10.5)
CHLORIDE SERPL-SCNC: 102 MMOL/L — SIGNIFICANT CHANGE UP (ref 96–108)
CHLORIDE SERPL-SCNC: 102 MMOL/L — SIGNIFICANT CHANGE UP (ref 96–108)
CHOLEST SERPL-MCNC: 101 MG/DL — SIGNIFICANT CHANGE UP
CHOLEST SERPL-MCNC: 101 MG/DL — SIGNIFICANT CHANGE UP
CO2 SERPL-SCNC: 26 MMOL/L — SIGNIFICANT CHANGE UP (ref 22–29)
CO2 SERPL-SCNC: 26 MMOL/L — SIGNIFICANT CHANGE UP (ref 22–29)
CREAT SERPL-MCNC: 0.95 MG/DL — SIGNIFICANT CHANGE UP (ref 0.5–1.3)
CREAT SERPL-MCNC: 0.95 MG/DL — SIGNIFICANT CHANGE UP (ref 0.5–1.3)
CULTURE RESULTS: SIGNIFICANT CHANGE UP
CULTURE RESULTS: SIGNIFICANT CHANGE UP
EGFR: 80 ML/MIN/1.73M2 — SIGNIFICANT CHANGE UP
EGFR: 80 ML/MIN/1.73M2 — SIGNIFICANT CHANGE UP
ESTIMATED AVERAGE GLUCOSE: 137 MG/DL — HIGH (ref 68–114)
ESTIMATED AVERAGE GLUCOSE: 137 MG/DL — HIGH (ref 68–114)
GLUCOSE BLDC GLUCOMTR-MCNC: 136 MG/DL — HIGH (ref 70–99)
GLUCOSE BLDC GLUCOMTR-MCNC: 136 MG/DL — HIGH (ref 70–99)
GLUCOSE BLDC GLUCOMTR-MCNC: 144 MG/DL — HIGH (ref 70–99)
GLUCOSE BLDC GLUCOMTR-MCNC: 144 MG/DL — HIGH (ref 70–99)
GLUCOSE BLDC GLUCOMTR-MCNC: 146 MG/DL — HIGH (ref 70–99)
GLUCOSE BLDC GLUCOMTR-MCNC: 146 MG/DL — HIGH (ref 70–99)
GLUCOSE BLDC GLUCOMTR-MCNC: 169 MG/DL — HIGH (ref 70–99)
GLUCOSE BLDC GLUCOMTR-MCNC: 169 MG/DL — HIGH (ref 70–99)
GLUCOSE SERPL-MCNC: 138 MG/DL — HIGH (ref 70–99)
GLUCOSE SERPL-MCNC: 138 MG/DL — HIGH (ref 70–99)
HDLC SERPL-MCNC: 48 MG/DL — SIGNIFICANT CHANGE UP
HDLC SERPL-MCNC: 48 MG/DL — SIGNIFICANT CHANGE UP
LIPID PNL WITH DIRECT LDL SERPL: 36 MG/DL — SIGNIFICANT CHANGE UP
LIPID PNL WITH DIRECT LDL SERPL: 36 MG/DL — SIGNIFICANT CHANGE UP
NON HDL CHOLESTEROL: 53 MG/DL — SIGNIFICANT CHANGE UP
NON HDL CHOLESTEROL: 53 MG/DL — SIGNIFICANT CHANGE UP
POTASSIUM SERPL-MCNC: 4.5 MMOL/L — SIGNIFICANT CHANGE UP (ref 3.5–5.3)
POTASSIUM SERPL-MCNC: 4.5 MMOL/L — SIGNIFICANT CHANGE UP (ref 3.5–5.3)
POTASSIUM SERPL-SCNC: 4.5 MMOL/L — SIGNIFICANT CHANGE UP (ref 3.5–5.3)
POTASSIUM SERPL-SCNC: 4.5 MMOL/L — SIGNIFICANT CHANGE UP (ref 3.5–5.3)
SODIUM SERPL-SCNC: 137 MMOL/L — SIGNIFICANT CHANGE UP (ref 135–145)
SODIUM SERPL-SCNC: 137 MMOL/L — SIGNIFICANT CHANGE UP (ref 135–145)
SPECIMEN SOURCE: SIGNIFICANT CHANGE UP
SPECIMEN SOURCE: SIGNIFICANT CHANGE UP
TRIGL SERPL-MCNC: 83 MG/DL — SIGNIFICANT CHANGE UP
TRIGL SERPL-MCNC: 83 MG/DL — SIGNIFICANT CHANGE UP

## 2023-10-28 PROCEDURE — 74018 RADEX ABDOMEN 1 VIEW: CPT | Mod: 26

## 2023-10-28 PROCEDURE — 99223 1ST HOSP IP/OBS HIGH 75: CPT

## 2023-10-28 PROCEDURE — 70553 MRI BRAIN STEM W/O & W/DYE: CPT | Mod: 26

## 2023-10-28 PROCEDURE — 99233 SBSQ HOSP IP/OBS HIGH 50: CPT

## 2023-10-28 PROCEDURE — 74176 CT ABD & PELVIS W/O CONTRAST: CPT | Mod: 26

## 2023-10-28 RX ORDER — PANTOPRAZOLE SODIUM 20 MG/1
20 TABLET, DELAYED RELEASE ORAL EVERY 12 HOURS
Refills: 0 | Status: DISCONTINUED | OUTPATIENT
Start: 2023-10-28 | End: 2023-10-30

## 2023-10-28 RX ORDER — DULOXETINE HYDROCHLORIDE 30 MG/1
20 CAPSULE, DELAYED RELEASE ORAL DAILY
Refills: 0 | Status: DISCONTINUED | OUTPATIENT
Start: 2023-10-28 | End: 2023-10-30

## 2023-10-28 RX ORDER — INSULIN LISPRO 100/ML
VIAL (ML) SUBCUTANEOUS
Refills: 0 | Status: DISCONTINUED | OUTPATIENT
Start: 2023-10-28 | End: 2023-10-30

## 2023-10-28 RX ORDER — ALPRAZOLAM 0.25 MG
0.25 TABLET ORAL ONCE
Refills: 0 | Status: DISCONTINUED | OUTPATIENT
Start: 2023-10-28 | End: 2023-10-30

## 2023-10-28 RX ORDER — SENNA PLUS 8.6 MG/1
2 TABLET ORAL AT BEDTIME
Refills: 0 | Status: DISCONTINUED | OUTPATIENT
Start: 2023-10-28 | End: 2023-10-30

## 2023-10-28 RX ORDER — DEXTROSE 50 % IN WATER 50 %
25 SYRINGE (ML) INTRAVENOUS ONCE
Refills: 0 | Status: DISCONTINUED | OUTPATIENT
Start: 2023-10-28 | End: 2023-10-30

## 2023-10-28 RX ORDER — DEXTROSE 50 % IN WATER 50 %
12.5 SYRINGE (ML) INTRAVENOUS ONCE
Refills: 0 | Status: DISCONTINUED | OUTPATIENT
Start: 2023-10-28 | End: 2023-10-30

## 2023-10-28 RX ORDER — TAMSULOSIN HYDROCHLORIDE 0.4 MG/1
0.4 CAPSULE ORAL AT BEDTIME
Refills: 0 | Status: DISCONTINUED | OUTPATIENT
Start: 2023-10-28 | End: 2023-10-30

## 2023-10-28 RX ORDER — CARVEDILOL PHOSPHATE 80 MG/1
3.12 CAPSULE, EXTENDED RELEASE ORAL EVERY 12 HOURS
Refills: 0 | Status: DISCONTINUED | OUTPATIENT
Start: 2023-10-28 | End: 2023-10-30

## 2023-10-28 RX ORDER — POLYETHYLENE GLYCOL 3350 17 G/17G
17 POWDER, FOR SOLUTION ORAL DAILY
Refills: 0 | Status: DISCONTINUED | OUTPATIENT
Start: 2023-10-28 | End: 2023-10-30

## 2023-10-28 RX ORDER — TRAMADOL HYDROCHLORIDE 50 MG/1
25 TABLET ORAL EVERY 6 HOURS
Refills: 0 | Status: DISCONTINUED | OUTPATIENT
Start: 2023-10-28 | End: 2023-10-30

## 2023-10-28 RX ORDER — ASPIRIN/CALCIUM CARB/MAGNESIUM 324 MG
81 TABLET ORAL DAILY
Refills: 0 | Status: DISCONTINUED | OUTPATIENT
Start: 2023-10-28 | End: 2023-10-30

## 2023-10-28 RX ORDER — BACLOFEN 100 %
5 POWDER (GRAM) MISCELLANEOUS EVERY 12 HOURS
Refills: 0 | Status: DISCONTINUED | OUTPATIENT
Start: 2023-10-28 | End: 2023-10-30

## 2023-10-28 RX ORDER — GLUCAGON INJECTION, SOLUTION 0.5 MG/.1ML
1 INJECTION, SOLUTION SUBCUTANEOUS ONCE
Refills: 0 | Status: DISCONTINUED | OUTPATIENT
Start: 2023-10-28 | End: 2023-10-30

## 2023-10-28 RX ORDER — SODIUM CHLORIDE 9 MG/ML
1000 INJECTION, SOLUTION INTRAVENOUS
Refills: 0 | Status: DISCONTINUED | OUTPATIENT
Start: 2023-10-28 | End: 2023-10-30

## 2023-10-28 RX ORDER — DEXTROSE 50 % IN WATER 50 %
15 SYRINGE (ML) INTRAVENOUS ONCE
Refills: 0 | Status: DISCONTINUED | OUTPATIENT
Start: 2023-10-28 | End: 2023-10-30

## 2023-10-28 RX ORDER — FINASTERIDE 5 MG/1
5 TABLET, FILM COATED ORAL DAILY
Refills: 0 | Status: DISCONTINUED | OUTPATIENT
Start: 2023-10-28 | End: 2023-10-30

## 2023-10-28 RX ORDER — AMLODIPINE BESYLATE 2.5 MG/1
5 TABLET ORAL DAILY
Refills: 0 | Status: DISCONTINUED | OUTPATIENT
Start: 2023-10-28 | End: 2023-10-30

## 2023-10-28 RX ADMIN — Medication 650 MILLIGRAM(S): at 02:05

## 2023-10-28 RX ADMIN — FINASTERIDE 5 MILLIGRAM(S): 5 TABLET, FILM COATED ORAL at 09:56

## 2023-10-28 RX ADMIN — AMLODIPINE BESYLATE 5 MILLIGRAM(S): 2.5 TABLET ORAL at 05:27

## 2023-10-28 RX ADMIN — TAMSULOSIN HYDROCHLORIDE 0.4 MILLIGRAM(S): 0.4 CAPSULE ORAL at 21:50

## 2023-10-28 RX ADMIN — ONDANSETRON 4 MILLIGRAM(S): 8 TABLET, FILM COATED ORAL at 18:51

## 2023-10-28 RX ADMIN — TRAMADOL HYDROCHLORIDE 25 MILLIGRAM(S): 50 TABLET ORAL at 11:45

## 2023-10-28 RX ADMIN — ATORVASTATIN CALCIUM 80 MILLIGRAM(S): 80 TABLET, FILM COATED ORAL at 21:51

## 2023-10-28 RX ADMIN — Medication 20 MILLIGRAM(S): at 01:18

## 2023-10-28 RX ADMIN — Medication 81 MILLIGRAM(S): at 09:56

## 2023-10-28 RX ADMIN — CARVEDILOL PHOSPHATE 3.12 MILLIGRAM(S): 80 CAPSULE, EXTENDED RELEASE ORAL at 05:27

## 2023-10-28 RX ADMIN — DULOXETINE HYDROCHLORIDE 20 MILLIGRAM(S): 30 CAPSULE, DELAYED RELEASE ORAL at 09:56

## 2023-10-28 RX ADMIN — ONDANSETRON 4 MILLIGRAM(S): 8 TABLET, FILM COATED ORAL at 01:08

## 2023-10-28 RX ADMIN — ONDANSETRON 4 MILLIGRAM(S): 8 TABLET, FILM COATED ORAL at 11:36

## 2023-10-28 RX ADMIN — SENNA PLUS 2 TABLET(S): 8.6 TABLET ORAL at 22:40

## 2023-10-28 RX ADMIN — Medication 650 MILLIGRAM(S): at 01:01

## 2023-10-28 RX ADMIN — CARVEDILOL PHOSPHATE 3.12 MILLIGRAM(S): 80 CAPSULE, EXTENDED RELEASE ORAL at 17:12

## 2023-10-28 NOTE — PROVIDER CONTACT NOTE (OTHER) - REASON
patient with nausea, headache, dizziness
Patient continues nausea, vomiting dizziness unrelieved by prn zofran

## 2023-10-28 NOTE — PROVIDER CONTACT NOTE (OTHER) - SITUATION
Patient complains of headache, dizziness, nausea without vomiting during neuro assessment
Patient continues to have nausea, vomiting and dizziness unrelieved by zofran

## 2023-10-28 NOTE — PROGRESS NOTE ADULT - SUBJECTIVE AND OBJECTIVE BOX
Patient is a 83y old  Male who presents with a chief complaint of Dysarthria: TIA versus CVA (27 Oct 2023 23:40)      incomplete   chart reviewed   ALLERGIES:  No Known Allergies    MEDICATIONS  (STANDING):  amLODIPine   Tablet 5 milliGRAM(s) Oral daily  aspirin  chewable 81 milliGRAM(s) Oral daily  atorvastatin 80 milliGRAM(s) Oral at bedtime  carvedilol 3.125 milliGRAM(s) Oral every 12 hours  dextrose 5%. 1000 milliLiter(s) (50 mL/Hr) IV Continuous <Continuous>  dextrose 5%. 1000 milliLiter(s) (100 mL/Hr) IV Continuous <Continuous>  dextrose 50% Injectable 25 Gram(s) IV Push once  dextrose 50% Injectable 12.5 Gram(s) IV Push once  dextrose 50% Injectable 25 Gram(s) IV Push once  DULoxetine 20 milliGRAM(s) Oral daily  enalapril 20 milliGRAM(s) Oral daily  finasteride 5 milliGRAM(s) Oral daily  glucagon  Injectable 1 milliGRAM(s) IntraMuscular once  insulin lispro (ADMELOG) corrective regimen sliding scale   SubCutaneous three times a day before meals  tamsulosin 0.4 milliGRAM(s) Oral at bedtime    MEDICATIONS  (PRN):  acetaminophen     Tablet .. 650 milliGRAM(s) Oral every 6 hours PRN Temp greater or equal to 38C (100.4F), Mild Pain (1 - 3)  aluminum hydroxide/magnesium hydroxide/simethicone Suspension 30 milliLiter(s) Oral every 4 hours PRN Dyspepsia  baclofen 5 milliGRAM(s) Oral every 12 hours PRN for muscle spasm  dextrose Oral Gel 15 Gram(s) Oral once PRN Blood Glucose LESS THAN 70 milliGRAM(s)/deciliter  melatonin 3 milliGRAM(s) Oral at bedtime PRN Insomnia  ondansetron Injectable 4 milliGRAM(s) IV Push every 8 hours PRN Nausea and/or Vomiting  traMADol 25 milliGRAM(s) Oral every 6 hours PRN Moderate Pain (4 - 6)    Vital Signs Last 24 Hrs  T(F): 98 (28 Oct 2023 07:41), Max: 98.3 (27 Oct 2023 19:20)  HR: 65 (28 Oct 2023 07:41) (57 - 72)  BP: 162/94 (28 Oct 2023 07:41) (162/94 - 195/94)  RR: 18 (28 Oct 2023 07:41) (16 - 18)  SpO2: 97% (28 Oct 2023 07:41) (97% - 99%)  I&O's Summary      PHYSICAL EXAM:  General:   ENT:   Neck:   Lungs:   Cardio: RRR, S1/S2, No murmur  Abdomen: Soft, Nontender, Nondistended; Bowel sounds present  Extremities: No calf tenderness, No pitting edema    LABS:                        12.8   6.83  )-----------( 145      ( 27 Oct 2023 19:30 )             37.4     10-28    137  |  102  |  18.6  ----------------------------<  138  4.5   |  26.0  |  0.95    Ca    8.8      28 Oct 2023 04:57    TPro  6.5  /  Alb  4.1  /  TBili  0.6  /  DBili  x   /  AST  17  /  ALT  11  /  AlkPhos  58  10-27          PT/INR - ( 27 Oct 2023 19:30 )   PT: 11.3 sec;   INR: 1.02 ratio         PTT - ( 27 Oct 2023 19:30 )  PTT:25.8 sec          10-28 Chol 101 mg/dL LDL -- HDL 48 mg/dL Trig 83 mg/dL              POCT Blood Glucose.: 123 mg/dL (27 Oct 2023 23:25)  POCT Blood Glucose.: 121 mg/dL (27 Oct 2023 22:28)  POCT Blood Glucose.: 133 mg/dL (27 Oct 2023 19:17)      Urinalysis Basic - ( 28 Oct 2023 04:57 )    Color: x / Appearance: x / SG: x / pH: x  Gluc: 138 mg/dL / Ketone: x  / Bili: x / Urobili: x   Blood: x / Protein: x / Nitrite: x   Leuk Esterase: x / RBC: x / WBC x   Sq Epi: x / Non Sq Epi: x / Bacteria: x          RADIOLOGY & ADDITIONAL TESTS:       Patient is a 83y old  Male who presents with a chief complaint of Dysarthria: TIA versus CVA (27 Oct 2023 23:40)        chart reviewed   pt is seen , speech better , able to answer , no dysarthria   stroke work up in progress       ALLERGIES:  No Known Allergies    MEDICATIONS  (STANDING):  amLODIPine   Tablet 5 milliGRAM(s) Oral daily  aspirin  chewable 81 milliGRAM(s) Oral daily  atorvastatin 80 milliGRAM(s) Oral at bedtime  carvedilol 3.125 milliGRAM(s) Oral every 12 hours  dextrose 5%. 1000 milliLiter(s) (50 mL/Hr) IV Continuous <Continuous>  dextrose 5%. 1000 milliLiter(s) (100 mL/Hr) IV Continuous <Continuous>  dextrose 50% Injectable 25 Gram(s) IV Push once  dextrose 50% Injectable 12.5 Gram(s) IV Push once  dextrose 50% Injectable 25 Gram(s) IV Push once  DULoxetine 20 milliGRAM(s) Oral daily  enalapril 20 milliGRAM(s) Oral daily  finasteride 5 milliGRAM(s) Oral daily  glucagon  Injectable 1 milliGRAM(s) IntraMuscular once  insulin lispro (ADMELOG) corrective regimen sliding scale   SubCutaneous three times a day before meals  tamsulosin 0.4 milliGRAM(s) Oral at bedtime    MEDICATIONS  (PRN):  acetaminophen     Tablet .. 650 milliGRAM(s) Oral every 6 hours PRN Temp greater or equal to 38C (100.4F), Mild Pain (1 - 3)  aluminum hydroxide/magnesium hydroxide/simethicone Suspension 30 milliLiter(s) Oral every 4 hours PRN Dyspepsia  baclofen 5 milliGRAM(s) Oral every 12 hours PRN for muscle spasm  dextrose Oral Gel 15 Gram(s) Oral once PRN Blood Glucose LESS THAN 70 milliGRAM(s)/deciliter  melatonin 3 milliGRAM(s) Oral at bedtime PRN Insomnia  ondansetron Injectable 4 milliGRAM(s) IV Push every 8 hours PRN Nausea and/or Vomiting  traMADol 25 milliGRAM(s) Oral every 6 hours PRN Moderate Pain (4 - 6)    Vital Signs Last 24 Hrs  T(F): 98 (28 Oct 2023 07:41), Max: 98.3 (27 Oct 2023 19:20)  HR: 65 (28 Oct 2023 07:41) (57 - 72)  BP: 162/94 (28 Oct 2023 07:41) (162/94 - 195/94)  RR: 18 (28 Oct 2023 07:41) (16 - 18)  SpO2: 97% (28 Oct 2023 07:41) (97% - 99%)  I&O's Summary      PHYSICAL EXAM:  General: awake alert   Neck: supple no JVD   Lungs: CTA bilateral   Cardio: RRR, S1/S2, No murmur  Abdomen: Soft, Nontender, Nondistended; Bowel sounds present  Extremities: No calf tenderness, No pitting edema  Neuro awake alert , no motor deficits          LABS:                        12.8   6.83  )-----------( 145      ( 27 Oct 2023 19:30 )             37.4     10-28    137  |  102  |  18.6  ----------------------------<  138  4.5   |  26.0  |  0.95    Ca    8.8      28 Oct 2023 04:57    TPro  6.5  /  Alb  4.1  /  TBili  0.6  /  DBili  x   /  AST  17  /  ALT  11  /  AlkPhos  58  10-27          PT/INR - ( 27 Oct 2023 19:30 )   PT: 11.3 sec;   INR: 1.02 ratio         PTT - ( 27 Oct 2023 19:30 )  PTT:25.8 sec          10-28 Chol 101 mg/dL LDL -- HDL 48 mg/dL Trig 83 mg/dL              POCT Blood Glucose.: 123 mg/dL (27 Oct 2023 23:25)  POCT Blood Glucose.: 121 mg/dL (27 Oct 2023 22:28)  POCT Blood Glucose.: 133 mg/dL (27 Oct 2023 19:17)      Urinalysis Basic - ( 28 Oct 2023 04:57 )    Color: x / Appearance: x / SG: x / pH: x  Gluc: 138 mg/dL / Ketone: x  / Bili: x / Urobili: x   Blood: x / Protein: x / Nitrite: x   Leuk Esterase: x / RBC: x / WBC x   Sq Epi: x / Non Sq Epi: x / Bacteria: x          RADIOLOGY & ADDITIONAL TESTS:

## 2023-10-28 NOTE — CONSULT NOTE ADULT - ASSESSMENT
A/P  82 YO M who had an episode of expressive aphasia yesterday while having dinner with family - neurologically back to baseline this morning according to the pt's wife. Pt did not receive TNK as his NIHSS per ED provider was 1. Of note, pt had hx of TIA's in the past, right sided weakness, which completely resolved. This morning pt started to vomit spontaneouly, reports that he feels nauseated only when he wakes up from sleep.  - recommend MRI brain w/o contrast  - Tele  - ECHO  - recommend evaluation of GI symptoms - pt is nauseated and constipated, which is very unusual for the pt as he usually has diarrhea.  - PT/OT/Speech eval  - rehab consideration  will follow

## 2023-10-28 NOTE — PROVIDER CONTACT NOTE (OTHER) - ACTION/TREATMENT ORDERED:
No new orders, care ongoing
CT abdomen and pelvis, KUB ordered patient to be NPO and fluids to be started

## 2023-10-28 NOTE — CHART NOTE - NSCHARTNOTEFT_GEN_A_CORE
Abdominal CT without obstruction  Pt states he is still nauseous  Pt states he will try to take oral stool softeners  Pt lying in bed NAD  speech clear, breathing easy and unlabored  VSS  protonix 20mg IVP BID  Miralax and senna ordered  NPO except meds  Advance diet as tolerated

## 2023-10-28 NOTE — CONSULT NOTE ADULT - SUBJECTIVE AND OBJECTIVE BOX
Stroke Neurology consult      HPI:  84 yo M PMHx HTN, HLD, Hx of TIA who presents with dysarthria since 1 pm on 1/27. Per patient, he and his wife were about to go out when pt began having dysarthria (pt was having difficulty finding words). Denies any other focal neurological findings. Endorses headache. No chest pain, no palpitations, no chest pain. Endorses compliance with medications. Endorses continued dysarthria during this interview. No slurred speach, no weakness, no SOB, no chest pain, no fever or chills.  In the ED, code stroke was activated. CTH and CTA head and neck were normal. Given NIH score was 1, no TPA due to low NIHSS.  Over night pt's neurological status improved, and this morning he is speaking fluently according to the pt's wife. He is, however, is having recurrent vomiting due to vertigo      REVIEW OF SYSTEMS: 10 systems were reviewed and are negative except whats in HPI  PMH: Hypertension, Guillain Barré syndrome, Diabetes, High cholesterol   PSH: H/O bilateral inguinal hernia repair, History of cholecystectomy, Lumbosacral spinal stenosis  FAMILY HISTORY: family history of cancer  SOCIAL HISTORY:  No history of tobacco or alcohol use   Allergies: No Known Allergies        Weight (kg): 102.4 (10-27 @ 19:41)    Vital Signs Last 24 Hrs  T(C): 36.6 (28 Oct 2023 11:37), Max: 36.8 (27 Oct 2023 19:20)  T(F): 97.9 (28 Oct 2023 11:37), Max: 98.3 (27 Oct 2023 19:20)  HR: 61 (28 Oct 2023 13:59) (57 - 72)  BP: 155/64 (28 Oct 2023 13:59) (155/64 - 195/94)  BP(mean): --  RR: 18 (28 Oct 2023 11:37) (16 - 18)  SpO2: 96% (28 Oct 2023 11:37) (96% - 99%)    Parameters below as of 28 Oct 2023 11:37  Patient On (Oxygen Delivery Method): room air      MEDICATIONS    acetaminophen     Tablet .. 650 milliGRAM(s) Oral every 6 hours PRN  aluminum hydroxide/magnesium hydroxide/simethicone Suspension 30 milliLiter(s) Oral every 4 hours PRN  amLODIPine   Tablet 5 milliGRAM(s) Oral daily  aspirin  chewable 81 milliGRAM(s) Oral daily  atorvastatin 80 milliGRAM(s) Oral at bedtime  baclofen 5 milliGRAM(s) Oral every 12 hours PRN  carvedilol 3.125 milliGRAM(s) Oral every 12 hours  dextrose 5%. 1000 milliLiter(s) IV Continuous <Continuous>  dextrose 5%. 1000 milliLiter(s) IV Continuous <Continuous>  dextrose 50% Injectable 25 Gram(s) IV Push once  dextrose 50% Injectable 25 Gram(s) IV Push once  dextrose 50% Injectable 12.5 Gram(s) IV Push once  dextrose Oral Gel 15 Gram(s) Oral once PRN  DULoxetine 20 milliGRAM(s) Oral daily  enalapril 20 milliGRAM(s) Oral daily  finasteride 5 milliGRAM(s) Oral daily  glucagon  Injectable 1 milliGRAM(s) IntraMuscular once  insulin lispro (ADMELOG) corrective regimen sliding scale   SubCutaneous three times a day before meals  melatonin 3 milliGRAM(s) Oral at bedtime PRN  ondansetron Injectable 4 milliGRAM(s) IV Push every 8 hours PRN  tamsulosin 0.4 milliGRAM(s) Oral at bedtime  traMADol 25 milliGRAM(s) Oral every 6 hours PRN        LABS:  CBC Full  -  ( 27 Oct 2023 19:30 )  WBC Count : 6.83 K/uL  RBC Count : 4.11 M/uL  Hemoglobin : 12.8 g/dL  Hematocrit : 37.4 %  Platelet Count - Automated : 145 K/uL  Mean Cell Volume : 91.0 fl  Mean Cell Hemoglobin : 31.1 pg  Mean Cell Hemoglobin Concentration : 34.2 gm/dL  Auto Neutrophil # : 3.71 K/uL  Auto Lymphocyte # : 2.23 K/uL  Auto Monocyte # : 0.65 K/uL  Auto Eosinophil # : 0.18 K/uL  Auto Basophil # : 0.04 K/uL  Auto Neutrophil % : 54.3 %  Auto Lymphocyte % : 32.7 %  Auto Monocyte % : 9.5 %  Auto Eosinophil % : 2.6 %  Auto Basophil % : 0.6 %    Urinalysis Basic - ( 28 Oct 2023 04:57 )    Color: x / Appearance: x / SG: x / pH: x  Gluc: 138 mg/dL / Ketone: x  / Bili: x / Urobili: x   Blood: x / Protein: x / Nitrite: x   Leuk Esterase: x / RBC: x / WBC x   Sq Epi: x / Non Sq Epi: x / Bacteria: x      10-28    137  |  102  |  18.6  ----------------------------<  138<H>  4.5   |  26.0  |  0.95    Ca    8.8      28 Oct 2023 04:57    TPro  6.5<L>  /  Alb  4.1  /  TBili  0.6  /  DBili  x   /  AST  17  /  ALT  11  /  AlkPhos  58  10-27    LIVER FUNCTIONS - ( 27 Oct 2023 19:30 )  Alb: 4.1 g/dL / Pro: 6.5 g/dL / ALK PHOS: 58 U/L / ALT: 11 U/L / AST: 17 U/L / GGT: x           Hemoglobin A1C:   Lipid Panel 10-28 @ 04:57  Total Cholesterol, Serum 101  LDL --  Triglycerides 83      PT/INR - ( 27 Oct 2023 19:30 )   PT: 11.3 sec;   INR: 1.02 ratio         PTT - ( 27 Oct 2023 19:30 )  PTT:25.8 sec      On Neurological Examination:  GENERAL Exam:     Nontoxic , No Acute Distress   Mental Status - Patient is  awake, alert and oriented X3.  Speech is fluent. Patient can name, repeat and follow commands correctly  There is no dysarthria.  Cranial Nerves - PERRL, EOMI,  Visual fields are full to finger counting, slight right facial asymmetry, tongue/uvula midline  Motor Exam -   Right upper ---5/5 No drift  Left upper ---5/5 No drift  Right lower ---5/5 No drift  Left lower  ---5/5 No drift  Sensory    Intact to light touch and pinprick bilaterally  Coord: FTN intact bilaterally   Gait -  Not assessed.                                       NIHSS 1 (facial asymmetry)  Premorbid MRS: 1 (walks with a cane)    RADIOLOGY ( All neurological imaging studies were independently reviewed and interpreted by me)  CTH - Mild chronic microvascular changes without evidence of an acute transcortical infarction or hemorrhage.  CTA/CTP - Negative CTP. No large vessel occlusion. Consider MRI of the brain.  MRI: pending  TTE: pending

## 2023-10-29 ENCOUNTER — TRANSCRIPTION ENCOUNTER (OUTPATIENT)
Age: 83
End: 2023-10-29

## 2023-10-29 LAB
ALBUMIN SERPL ELPH-MCNC: 3.6 G/DL — SIGNIFICANT CHANGE UP (ref 3.3–5.2)
ALBUMIN SERPL ELPH-MCNC: 3.6 G/DL — SIGNIFICANT CHANGE UP (ref 3.3–5.2)
ALP SERPL-CCNC: 54 U/L — SIGNIFICANT CHANGE UP (ref 40–120)
ALP SERPL-CCNC: 54 U/L — SIGNIFICANT CHANGE UP (ref 40–120)
ALT FLD-CCNC: 9 U/L — SIGNIFICANT CHANGE UP
ALT FLD-CCNC: 9 U/L — SIGNIFICANT CHANGE UP
ANION GAP SERPL CALC-SCNC: 10 MMOL/L — SIGNIFICANT CHANGE UP (ref 5–17)
ANION GAP SERPL CALC-SCNC: 10 MMOL/L — SIGNIFICANT CHANGE UP (ref 5–17)
AST SERPL-CCNC: 16 U/L — SIGNIFICANT CHANGE UP
AST SERPL-CCNC: 16 U/L — SIGNIFICANT CHANGE UP
BILIRUB SERPL-MCNC: 1 MG/DL — SIGNIFICANT CHANGE UP (ref 0.4–2)
BILIRUB SERPL-MCNC: 1 MG/DL — SIGNIFICANT CHANGE UP (ref 0.4–2)
BUN SERPL-MCNC: 18.2 MG/DL — SIGNIFICANT CHANGE UP (ref 8–20)
BUN SERPL-MCNC: 18.2 MG/DL — SIGNIFICANT CHANGE UP (ref 8–20)
CALCIUM SERPL-MCNC: 9.2 MG/DL — SIGNIFICANT CHANGE UP (ref 8.4–10.5)
CALCIUM SERPL-MCNC: 9.2 MG/DL — SIGNIFICANT CHANGE UP (ref 8.4–10.5)
CHLORIDE SERPL-SCNC: 98 MMOL/L — SIGNIFICANT CHANGE UP (ref 96–108)
CHLORIDE SERPL-SCNC: 98 MMOL/L — SIGNIFICANT CHANGE UP (ref 96–108)
CO2 SERPL-SCNC: 28 MMOL/L — SIGNIFICANT CHANGE UP (ref 22–29)
CO2 SERPL-SCNC: 28 MMOL/L — SIGNIFICANT CHANGE UP (ref 22–29)
CREAT SERPL-MCNC: 1.07 MG/DL — SIGNIFICANT CHANGE UP (ref 0.5–1.3)
CREAT SERPL-MCNC: 1.07 MG/DL — SIGNIFICANT CHANGE UP (ref 0.5–1.3)
EGFR: 69 ML/MIN/1.73M2 — SIGNIFICANT CHANGE UP
EGFR: 69 ML/MIN/1.73M2 — SIGNIFICANT CHANGE UP
GLUCOSE BLDC GLUCOMTR-MCNC: 129 MG/DL — HIGH (ref 70–99)
GLUCOSE BLDC GLUCOMTR-MCNC: 129 MG/DL — HIGH (ref 70–99)
GLUCOSE BLDC GLUCOMTR-MCNC: 148 MG/DL — HIGH (ref 70–99)
GLUCOSE BLDC GLUCOMTR-MCNC: 148 MG/DL — HIGH (ref 70–99)
GLUCOSE BLDC GLUCOMTR-MCNC: 190 MG/DL — HIGH (ref 70–99)
GLUCOSE BLDC GLUCOMTR-MCNC: 190 MG/DL — HIGH (ref 70–99)
GLUCOSE SERPL-MCNC: 144 MG/DL — HIGH (ref 70–99)
GLUCOSE SERPL-MCNC: 144 MG/DL — HIGH (ref 70–99)
HCT VFR BLD CALC: 34.6 % — LOW (ref 39–50)
HCT VFR BLD CALC: 34.6 % — LOW (ref 39–50)
HGB BLD-MCNC: 11.6 G/DL — LOW (ref 13–17)
HGB BLD-MCNC: 11.6 G/DL — LOW (ref 13–17)
MCHC RBC-ENTMCNC: 30.9 PG — SIGNIFICANT CHANGE UP (ref 27–34)
MCHC RBC-ENTMCNC: 30.9 PG — SIGNIFICANT CHANGE UP (ref 27–34)
MCHC RBC-ENTMCNC: 33.5 GM/DL — SIGNIFICANT CHANGE UP (ref 32–36)
MCHC RBC-ENTMCNC: 33.5 GM/DL — SIGNIFICANT CHANGE UP (ref 32–36)
MCV RBC AUTO: 92 FL — SIGNIFICANT CHANGE UP (ref 80–100)
MCV RBC AUTO: 92 FL — SIGNIFICANT CHANGE UP (ref 80–100)
PLATELET # BLD AUTO: 143 K/UL — LOW (ref 150–400)
PLATELET # BLD AUTO: 143 K/UL — LOW (ref 150–400)
POTASSIUM SERPL-MCNC: 4.1 MMOL/L — SIGNIFICANT CHANGE UP (ref 3.5–5.3)
POTASSIUM SERPL-MCNC: 4.1 MMOL/L — SIGNIFICANT CHANGE UP (ref 3.5–5.3)
POTASSIUM SERPL-SCNC: 4.1 MMOL/L — SIGNIFICANT CHANGE UP (ref 3.5–5.3)
POTASSIUM SERPL-SCNC: 4.1 MMOL/L — SIGNIFICANT CHANGE UP (ref 3.5–5.3)
PROT SERPL-MCNC: 6 G/DL — LOW (ref 6.6–8.7)
PROT SERPL-MCNC: 6 G/DL — LOW (ref 6.6–8.7)
RBC # BLD: 3.76 M/UL — LOW (ref 4.2–5.8)
RBC # BLD: 3.76 M/UL — LOW (ref 4.2–5.8)
RBC # FLD: 13 % — SIGNIFICANT CHANGE UP (ref 10.3–14.5)
RBC # FLD: 13 % — SIGNIFICANT CHANGE UP (ref 10.3–14.5)
SODIUM SERPL-SCNC: 136 MMOL/L — SIGNIFICANT CHANGE UP (ref 135–145)
SODIUM SERPL-SCNC: 136 MMOL/L — SIGNIFICANT CHANGE UP (ref 135–145)
WBC # BLD: 6.7 K/UL — SIGNIFICANT CHANGE UP (ref 3.8–10.5)
WBC # BLD: 6.7 K/UL — SIGNIFICANT CHANGE UP (ref 3.8–10.5)
WBC # FLD AUTO: 6.7 K/UL — SIGNIFICANT CHANGE UP (ref 3.8–10.5)
WBC # FLD AUTO: 6.7 K/UL — SIGNIFICANT CHANGE UP (ref 3.8–10.5)

## 2023-10-29 PROCEDURE — 99232 SBSQ HOSP IP/OBS MODERATE 35: CPT

## 2023-10-29 PROCEDURE — 99233 SBSQ HOSP IP/OBS HIGH 50: CPT

## 2023-10-29 RX ORDER — MECLIZINE HCL 12.5 MG
12.5 TABLET ORAL
Refills: 0 | Status: DISCONTINUED | OUTPATIENT
Start: 2023-10-29 | End: 2023-10-29

## 2023-10-29 RX ORDER — MULTIVIT WITH MIN/MFOLATE/K2 340-15/3 G
296 POWDER (GRAM) ORAL ONCE
Refills: 0 | Status: COMPLETED | OUTPATIENT
Start: 2023-10-29 | End: 2023-10-29

## 2023-10-29 RX ORDER — SEMAGLUTIDE 0.68 MG/ML
1 INJECTION, SOLUTION SUBCUTANEOUS
Refills: 0 | DISCHARGE

## 2023-10-29 RX ORDER — HYDRALAZINE HCL 50 MG
5 TABLET ORAL ONCE
Refills: 0 | Status: COMPLETED | OUTPATIENT
Start: 2023-10-29 | End: 2023-10-29

## 2023-10-29 RX ORDER — CLOPIDOGREL BISULFATE 75 MG/1
75 TABLET, FILM COATED ORAL DAILY
Refills: 0 | Status: DISCONTINUED | OUTPATIENT
Start: 2023-10-29 | End: 2023-10-30

## 2023-10-29 RX ADMIN — Medication 81 MILLIGRAM(S): at 12:20

## 2023-10-29 RX ADMIN — Medication 1: at 12:30

## 2023-10-29 RX ADMIN — AMLODIPINE BESYLATE 5 MILLIGRAM(S): 2.5 TABLET ORAL at 06:20

## 2023-10-29 RX ADMIN — TAMSULOSIN HYDROCHLORIDE 0.4 MILLIGRAM(S): 0.4 CAPSULE ORAL at 22:02

## 2023-10-29 RX ADMIN — POLYETHYLENE GLYCOL 3350 17 GRAM(S): 17 POWDER, FOR SOLUTION ORAL at 12:21

## 2023-10-29 RX ADMIN — PANTOPRAZOLE SODIUM 20 MILLIGRAM(S): 20 TABLET, DELAYED RELEASE ORAL at 17:51

## 2023-10-29 RX ADMIN — CARVEDILOL PHOSPHATE 3.12 MILLIGRAM(S): 80 CAPSULE, EXTENDED RELEASE ORAL at 17:50

## 2023-10-29 RX ADMIN — CARVEDILOL PHOSPHATE 3.12 MILLIGRAM(S): 80 CAPSULE, EXTENDED RELEASE ORAL at 06:19

## 2023-10-29 RX ADMIN — DULOXETINE HYDROCHLORIDE 20 MILLIGRAM(S): 30 CAPSULE, DELAYED RELEASE ORAL at 12:29

## 2023-10-29 RX ADMIN — Medication 5 MILLIGRAM(S): at 22:33

## 2023-10-29 RX ADMIN — Medication 20 MILLIGRAM(S): at 06:19

## 2023-10-29 RX ADMIN — CLOPIDOGREL BISULFATE 75 MILLIGRAM(S): 75 TABLET, FILM COATED ORAL at 12:21

## 2023-10-29 RX ADMIN — Medication 650 MILLIGRAM(S): at 14:16

## 2023-10-29 RX ADMIN — FINASTERIDE 5 MILLIGRAM(S): 5 TABLET, FILM COATED ORAL at 12:21

## 2023-10-29 RX ADMIN — Medication 650 MILLIGRAM(S): at 14:59

## 2023-10-29 RX ADMIN — Medication 296 MILLILITER(S): at 12:25

## 2023-10-29 RX ADMIN — PANTOPRAZOLE SODIUM 20 MILLIGRAM(S): 20 TABLET, DELAYED RELEASE ORAL at 06:19

## 2023-10-29 NOTE — DISCHARGE NOTE PROVIDER - CARE PROVIDER_API CALL
Aly Holman  Neurology  [address], [address]  [place] [address]  Phone: [phone]  Fax: [phone]  Follow Up Time:     PMD,   in 1 week, please call the office and get an appiontment  Phone: (   )    -  Fax: (   )    -  Follow Up Time:

## 2023-10-29 NOTE — PHYSICAL THERAPY INITIAL EVALUATION ADULT - PERTINENT HX OF CURRENT PROBLEM, REHAB EVAL
82 yo M PMHx HTN, HLD, Hx of TIA who presents with dysarthria since 1 pm on 1/27. Per patient, he and his wife were about to go out when pt began having dysarthria (pt was having difficulty finding words). Denies any other focal neurological findings. Endorses headache. No chest pain, no palpitations, no chest pain. Endorses compliance with medications. Endorses continued dysarthria during this interview. No slurred speach, no weakness, no SOB, no chest pain, no fever or chills.

## 2023-10-29 NOTE — PHYSICAL THERAPY INITIAL EVALUATION ADULT - ADDITIONAL COMMENTS
Pt AxOx4 states he lives at home with wife with 1+ 3STE 1HR and 0 steps inside. Pt was independent with [employer] and owns RW.

## 2023-10-29 NOTE — DISCHARGE NOTE PROVIDER - NSDCMRMEDTOKEN_GEN_ALL_CORE_FT
alfuzosin 10 mg oral tablet, extended release: 1 tab(s) orally once a day  amLODIPine 5 mg oral tablet: 1 tab(s) orally once a day  aspirin 81 mg oral tablet: 1 tab(s) orally once a day  baclofen 10 mg oral tablet: 1 tab(s) orally 3 times a day as needed for  muscle spasm  carvedilol 3.125 mg oral tablet: 1 tab(s) orally 2 times a day  DULoxetine 20 mg oral delayed release capsule: 1 cap(s) orally once a day  dutasteride 0.5 mg oral capsule: 1 cap(s) orally once a day  enalapril 20 mg oral tablet: 1 tab(s) orally once a day  ezetimibe 10 mg oral tablet: 1 tab(s) orally once a day (at bedtime)   alfuzosin 10 mg oral tablet, extended release: 1 tab(s) orally once a day  amLODIPine 5 mg oral tablet: 1 tab(s) orally once a day  aspirin 81 mg oral tablet: 1 tab(s) orally once a day  baclofen 10 mg oral tablet: 1 tab(s) orally 3 times a day as needed for  muscle spasm  carvedilol 3.125 mg oral tablet: 1 tab(s) orally 2 times a day  DULoxetine 20 mg oral delayed release capsule: 1 cap(s) orally once a day  dutasteride 0.5 mg oral capsule: 1 cap(s) orally once a day  enalapril 20 mg oral tablet: 1 tab(s) orally once a day  ezetimibe 10 mg oral tablet: 1 tab(s) orally once a day (at bedtime)  Plavix 75 mg oral tablet: 1 tab(s) orally once a day  Protonix 40 mg oral delayed release tablet: 1 tab(s) orally once a day

## 2023-10-29 NOTE — DISCHARGE NOTE PROVIDER - DISCHARGE SERVICE FOR PATIENT
FROM AD BRYANT on the discharge service for the patient. I have reviewed and made amendments to the documentation where necessary.

## 2023-10-29 NOTE — DISCHARGE NOTE PROVIDER - HOSPITAL COURSE
84 yo M Hx HTN, HLD, Hx of TIA who presents with dysarthria since 1 pm on 1/27, he and his wife were about to go out when pt began having dysarthria (pt was having difficulty finding words), denied weakness, numbness, SOB, chest pain, fever or chills, in the ED, code stroke was activated, CTH and CTA head and neck were normal. 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  has been started on diet, discussed with Neurology as per them continue with aspirin and add Plavix 75mg daily and follow with neurology.     Patient was having some nausea and he vomited over the course, he was started on Protonix and was given Zofran.   US Kub done results shows     Patient has hx of HTN, on carvedilol 3.125 mg BID, amlodine and Enalapril.      For hx of DM, ISS and POC glucose, on home semaglutide    for his Hx of BPH, he was continued with his home meds              84 yo M Hx HTN, HLD, Hx of TIA who presents with dysarthria since 1 pm on 1/27, he and his wife were about to go out when pt began having dysarthria (pt was having difficulty finding words), denied weakness, numbness, SOB, chest pain, fever or chills, in the ED, code stroke was activated, CTH and CTA head and neck were normal. 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  has been started on diet, discussed with Neurology as per them continue with aspirin and add Plavix 75mg daily and follow with neurology.     Patient was having some nausea and he vomited over the course, he was started on Protonix and was given Zofran, these were in setting of constipation, he was given bowel meds and his had multiple bowel movements, his symptoms resolved, able to tolerated food well.     Patient has hx of HTN, on carvedilol 3.125 mg BID, amlodine and Enalapril.      For hx of DM, ISS and POC glucose, on home semaglutide    for his Hx of BPH, he was continued with his home meds     Patient is doing ok, being discharged home in a stable condition.     Vital Signs Last 24 Hrs  T(C): 36.4 (30 Oct 2023 08:05), Max: 37.1 (30 Oct 2023 00:07)  T(F): 97.6 (30 Oct 2023 08:05), Max: 98.7 (30 Oct 2023 00:07)  HR: 58 (30 Oct 2023 08:05) (55 - 65)  BP: 148/70 (30 Oct 2023 08:05) (148/70 - 197/84)  BP(mean): 109 (29 Oct 2023 16:52) (109 - 109)  RR: 17 (30 Oct 2023 08:05) (17 - 18)  SpO2: 96% (30 Oct 2023 08:05) (91% - 98%)    Parameters below as of 30 Oct 2023 08:05  Patient On (Oxygen Delivery Method): room air      PHYSICAL EXAM:    GENERAL: Elderly male looking comfortable  HEENT: PERRL, +EOMI  NECK: soft, Supple, No JVD  CHEST/LUNG: Clear to auscultate bilaterally; No wheezing  HEART: S1S2+, Regular rate and rhythm; No murmurs  ABDOMEN: Soft, Nontender, Nondistended; Bowel sounds present  EXTREMITIES:  1+ Peripheral Pulses, No edema  SKIN: No rashes or lesions  NEURO: AAOX3, moving all ext, no focal deficit, following commends   PSYCH: normal mood

## 2023-10-29 NOTE — DISCHARGE NOTE PROVIDER - PROVIDER TOKENS
PROVIDER:[TOKEN:[[id number]]],FREE:[LAST:[PMD],PHONE:[(   )    -],FAX:[(   )    -],ADDRESS:[in 1 week, please call the office and get an appiontment]]

## 2023-10-29 NOTE — DISCHARGE NOTE PROVIDER - NSDCCPCAREPLAN_GEN_ALL_CORE_FT
PRINCIPAL DISCHARGE DIAGNOSIS  Diagnosis: TIA (transient ischemic attack)  Assessment and Plan of Treatment:

## 2023-10-29 NOTE — PROGRESS NOTE ADULT - SUBJECTIVE AND OBJECTIVE BOX
Stroke Neurology consult      HPI:  84 yo M PMHx HTN, HLD, Hx of TIA who presents with dysarthria since 1 pm on 1/27. Per patient, he and his wife were about to go out when pt began having dysarthria (pt was having difficulty finding words). Denies any other focal neurological findings. Endorses headache. No chest pain, no palpitations, no chest pain. Endorses compliance with medications. Endorses continued dysarthria during this interview. No slurred speach, no weakness, no SOB, no chest pain, no fever or chills.  In the ED, code stroke was activated. CTH and CTA head and neck were normal. Given NIH score was 1, no TPA due to low NIHSS.  Over night pt's neurological status improved, and this morning he is speaking fluently according to the pt's wife. He is, however, is having recurrent vomiting due to vertigo      S: no changes over night, reports feeling better today    Weight (kg): 102.4 (10-27 @ 19:41)    Vital Signs Last 24 Hrs  ICU Vital Signs Last 24 Hrs  T(C): 36.5 (29 Oct 2023 09:06), Max: 37 (29 Oct 2023 06:24)  T(F): 97.7 (29 Oct 2023 09:06), Max: 98.6 (29 Oct 2023 06:24)  HR: 56 (29 Oct 2023 09:06) (52 - 77)  BP: 148/73 (29 Oct 2023 09:06) (106/69 - 166/78)  BP(mean): --  ABP: --  ABP(mean): --  RR: 18 (29 Oct 2023 09:06) (18 - 18)  SpO2: 95% (29 Oct 2023 09:06) (91% - 95%)    O2 Parameters below as of 29 Oct 2023 09:06  Patient On (Oxygen Delivery Method): room air      MEDICATIONS    acetaminophen     Tablet .. 650 milliGRAM(s) Oral every 6 hours PRN  aluminum hydroxide/magnesium hydroxide/simethicone Suspension 30 milliLiter(s) Oral every 4 hours PRN  amLODIPine   Tablet 5 milliGRAM(s) Oral daily  aspirin  chewable 81 milliGRAM(s) Oral daily  atorvastatin 80 milliGRAM(s) Oral at bedtime  baclofen 5 milliGRAM(s) Oral every 12 hours PRN  carvedilol 3.125 milliGRAM(s) Oral every 12 hours  dextrose 5%. 1000 milliLiter(s) IV Continuous <Continuous>  dextrose 5%. 1000 milliLiter(s) IV Continuous <Continuous>  dextrose 50% Injectable 25 Gram(s) IV Push once  dextrose 50% Injectable 25 Gram(s) IV Push once  dextrose 50% Injectable 12.5 Gram(s) IV Push once  dextrose Oral Gel 15 Gram(s) Oral once PRN  DULoxetine 20 milliGRAM(s) Oral daily  enalapril 20 milliGRAM(s) Oral daily  finasteride 5 milliGRAM(s) Oral daily  glucagon  Injectable 1 milliGRAM(s) IntraMuscular once  insulin lispro (ADMELOG) corrective regimen sliding scale   SubCutaneous three times a day before meals  melatonin 3 milliGRAM(s) Oral at bedtime PRN  ondansetron Injectable 4 milliGRAM(s) IV Push every 8 hours PRN  tamsulosin 0.4 milliGRAM(s) Oral at bedtime  traMADol 25 milliGRAM(s) Oral every 6 hours PRN        LABS:  CBC:            11.6   6.70  )-----------( 143      ( 10-29-23 @ 06:42 )             34.6         Chem:         ( 10-29-23 @ 06:42 )    136  |  98  |  18.2  ----------------------------<  144<H>  4.1   |  28.0  |  1.07        Liver Functions: ( 10-29-23 @ 06:42 )  Alb: 3.6 g/dL / Pro: 6.0 g/dL / ALK PHOS: 54 U/L / ALT: 9 U/L / AST: 16 U/L / GGT: x              Type & Screen:       On Neurological Examination:  GENERAL Exam:     Nontoxic , No Acute Distress   Mental Status - Patient is  awake, alert and oriented X3.  Speech is fluent. Patient can name, repeat and follow commands correctly  There is no dysarthria.  Cranial Nerves - PERRL, EOMI,  Visual fields are full to finger counting, slight right facial asymmetry, tongue/uvula midline  Motor Exam -   Right upper ---5/5 No drift  Left upper ---5/5 No drift  Right lower ---5/5 No drift  Left lower  ---5/5 No drift  Sensory    Intact to light touch and pinprick bilaterally  Coord: FTN intact bilaterally   Gait -  Not assessed.                                       NIHSS 1 (facial asymmetry)  Premorbid MRS: 1 (walks with a cane)    RADIOLOGY ( All neurological imaging studies were independently reviewed and interpreted by me)  CTH - Mild chronic microvascular changes without evidence of an acute transcortical infarction or hemorrhage.  CTA/CTP - Negative CTP. No large vessel occlusion. Consider MRI of the brain.  MRI: No hydrocephalus, midline shift, intracranial hemorrhage or cerebral infarction. Mild periventricular chronic white matter microvascular ischemic changes. Chronic pontine infarcts.Generalized cerebral volume loss. No abnormal parenchymal or leptomeningeal enhancement.  Right mastoid effusion.    TTE: pending

## 2023-10-29 NOTE — PROGRESS NOTE ADULT - SUBJECTIVE AND OBJECTIVE BOX
KETAN MARKHAM    [id number]    83y      Male    Patient is a 83y old  Male who presents with a chief complaint of Dysarthria: TIA versus CVA (29 Oct 2023 13:38)      INTERVAL HPI/OVERNIGHT EVENTS:    Patient is doing ok, has no more dysarthria, had some nausea and vomiting, KUB xr done showed lots of gas pattern, denies fever, chills, chest pain, sob.        Vital Signs Last 24 Hrs  T(C): 36.5 (29 Oct 2023 09:06), Max: 37 (29 Oct 2023 06:24)  T(F): 97.7 (29 Oct 2023 09:06), Max: 98.6 (29 Oct 2023 06:24)  HR: 56 (29 Oct 2023 09:06) (52 - 77)  BP: 148/73 (29 Oct 2023 09:06) (106/69 - 166/78)  RR: 18 (29 Oct 2023 09:06) (18 - 18)  SpO2: 95% (29 Oct 2023 09:06) (91% - 95%)    Parameters below as of 29 Oct 2023 09:06  Patient On (Oxygen Delivery Method): room air        PHYSICAL EXAM:    GENERAL: Elderly male looking comfortable  HEENT: PERRL, +EOMI  NECK: soft, Supple, No JVD  CHEST/LUNG: Clear to auscultate bilaterally; No wheezing  HEART: S1S2+, Regular rate and rhythm; No murmurs  ABDOMEN: Soft, Nontender, Nondistended; Bowel sounds present  EXTREMITIES:  1+ Peripheral Pulses, No edema  SKIN: No rashes or lesions  NEURO: AAOX3  PSYCH: normal mood      LABS:                        11.6   6.70  )-----------( 143      ( 29 Oct 2023 06:42 )             34.6     10-29    136  |  98  |  18.2  ----------------------------<  144<H>  4.1   |  28.0  |  1.07    Ca    9.2      29 Oct 2023 06:42    TPro  6.0<L>  /  Alb  3.6  /  TBili  1.0  /  DBili  x   /  AST  16  /  ALT  9   /  AlkPhos  54  10-29    PT/INR - ( 27 Oct 2023 19:30 )   PT: 11.3 sec;   INR: 1.02 ratio         PTT - ( 27 Oct 2023 19:30 )  PTT:25.8 sec  Urinalysis Basic - ( 29 Oct 2023 06:42 )    Color: x / Appearance: x / SG: x / pH: x  Gluc: 144 mg/dL / Ketone: x  / Bili: x / Urobili: x   Blood: x / Protein: x / Nitrite: x   Leuk Esterase: x / RBC: x / WBC x   Sq Epi: x / Non Sq Epi: x / Bacteria: x          I&O's Summary      MEDICATIONS  (STANDING):  ALPRAZolam 0.25 milliGRAM(s) Oral once  amLODIPine   Tablet 5 milliGRAM(s) Oral daily  aspirin  chewable 81 milliGRAM(s) Oral daily  carvedilol 3.125 milliGRAM(s) Oral every 12 hours  clopidogrel Tablet 75 milliGRAM(s) Oral daily  dextrose 5%. 1000 milliLiter(s) (50 mL/Hr) IV Continuous <Continuous>  dextrose 5%. 1000 milliLiter(s) (100 mL/Hr) IV Continuous <Continuous>  dextrose 50% Injectable 12.5 Gram(s) IV Push once  dextrose 50% Injectable 25 Gram(s) IV Push once  dextrose 50% Injectable 25 Gram(s) IV Push once  DULoxetine 20 milliGRAM(s) Oral daily  enalapril 20 milliGRAM(s) Oral daily  finasteride 5 milliGRAM(s) Oral daily  glucagon  Injectable 1 milliGRAM(s) IntraMuscular once  insulin lispro (ADMELOG) corrective regimen sliding scale   SubCutaneous three times a day before meals  pantoprazole  Injectable 20 milliGRAM(s) IV Push every 12 hours  polyethylene glycol 3350 17 Gram(s) Oral daily  senna 2 Tablet(s) Oral at bedtime  tamsulosin 0.4 milliGRAM(s) Oral at bedtime    MEDICATIONS  (PRN):  acetaminophen     Tablet .. 650 milliGRAM(s) Oral every 6 hours PRN Temp greater or equal to 38C (100.4F), Mild Pain (1 - 3)  aluminum hydroxide/magnesium hydroxide/simethicone Suspension 30 milliLiter(s) Oral every 4 hours PRN Dyspepsia  baclofen 5 milliGRAM(s) Oral every 12 hours PRN for muscle spasm  dextrose Oral Gel 15 Gram(s) Oral once PRN Blood Glucose LESS THAN 70 milliGRAM(s)/deciliter  melatonin 3 milliGRAM(s) Oral at bedtime PRN Insomnia  ondansetron Injectable 4 milliGRAM(s) IV Push every 8 hours PRN Nausea and/or Vomiting  traMADol 25 milliGRAM(s) Oral every 6 hours PRN Moderate Pain (4 - 6)         KETAN MARKHAM    [id number]    83y      Male    Patient is a 83y old  Male who presents with a chief complaint of Dysarthria: TIA versus CVA (29 Oct 2023 13:38)      INTERVAL HPI/OVERNIGHT EVENTS:    Patient is doing ok, has no more dysarthria, had some nausea and vomiting, KUB xr done showed lots of gas pattern, denies fever, chills, chest pain, sob.        Vital Signs Last 24 Hrs  T(C): 36.5 (29 Oct 2023 09:06), Max: 37 (29 Oct 2023 06:24)  T(F): 97.7 (29 Oct 2023 09:06), Max: 98.6 (29 Oct 2023 06:24)  HR: 56 (29 Oct 2023 09:06) (52 - 77)  BP: 148/73 (29 Oct 2023 09:06) (106/69 - 166/78)  RR: 18 (29 Oct 2023 09:06) (18 - 18)  SpO2: 95% (29 Oct 2023 09:06) (91% - 95%)    Parameters below as of 29 Oct 2023 09:06  Patient On (Oxygen Delivery Method): room air        PHYSICAL EXAM:    GENERAL: Elderly male looking comfortable  HEENT: PERRL, +EOMI  NECK: soft, Supple, No JVD  CHEST/LUNG: Clear to auscultate bilaterally; No wheezing  HEART: S1S2+, Regular rate and rhythm; No murmurs  ABDOMEN: Soft, Nontender, Nondistended; Bowel sounds present  EXTREMITIES:  1+ Peripheral Pulses, No edema  SKIN: No rashes or lesions  NEURO: AAOX3, moving all ext, no focal deficit, following commends   PSYCH: normal mood      LABS:                        11.6   6.70  )-----------( 143      ( 29 Oct 2023 06:42 )             34.6     10-29    136  |  98  |  18.2  ----------------------------<  144<H>  4.1   |  28.0  |  1.07    Ca    9.2      29 Oct 2023 06:42    TPro  6.0<L>  /  Alb  3.6  /  TBili  1.0  /  DBili  x   /  AST  16  /  ALT  9   /  AlkPhos  54  10-29    PT/INR - ( 27 Oct 2023 19:30 )   PT: 11.3 sec;   INR: 1.02 ratio         PTT - ( 27 Oct 2023 19:30 )  PTT:25.8 sec  Urinalysis Basic - ( 29 Oct 2023 06:42 )    Color: x / Appearance: x / SG: x / pH: x  Gluc: 144 mg/dL / Ketone: x  / Bili: x / Urobili: x   Blood: x / Protein: x / Nitrite: x   Leuk Esterase: x / RBC: x / WBC x   Sq Epi: x / Non Sq Epi: x / Bacteria: x          I&O's Summary      MEDICATIONS  (STANDING):  ALPRAZolam 0.25 milliGRAM(s) Oral once  amLODIPine   Tablet 5 milliGRAM(s) Oral daily  aspirin  chewable 81 milliGRAM(s) Oral daily  carvedilol 3.125 milliGRAM(s) Oral every 12 hours  clopidogrel Tablet 75 milliGRAM(s) Oral daily  dextrose 5%. 1000 milliLiter(s) (50 mL/Hr) IV Continuous <Continuous>  dextrose 5%. 1000 milliLiter(s) (100 mL/Hr) IV Continuous <Continuous>  dextrose 50% Injectable 12.5 Gram(s) IV Push once  dextrose 50% Injectable 25 Gram(s) IV Push once  dextrose 50% Injectable 25 Gram(s) IV Push once  DULoxetine 20 milliGRAM(s) Oral daily  enalapril 20 milliGRAM(s) Oral daily  finasteride 5 milliGRAM(s) Oral daily  glucagon  Injectable 1 milliGRAM(s) IntraMuscular once  insulin lispro (ADMELOG) corrective regimen sliding scale   SubCutaneous three times a day before meals  pantoprazole  Injectable 20 milliGRAM(s) IV Push every 12 hours  polyethylene glycol 3350 17 Gram(s) Oral daily  senna 2 Tablet(s) Oral at bedtime  tamsulosin 0.4 milliGRAM(s) Oral at bedtime    MEDICATIONS  (PRN):  acetaminophen     Tablet .. 650 milliGRAM(s) Oral every 6 hours PRN Temp greater or equal to 38C (100.4F), Mild Pain (1 - 3)  aluminum hydroxide/magnesium hydroxide/simethicone Suspension 30 milliLiter(s) Oral every 4 hours PRN Dyspepsia  baclofen 5 milliGRAM(s) Oral every 12 hours PRN for muscle spasm  dextrose Oral Gel 15 Gram(s) Oral once PRN Blood Glucose LESS THAN 70 milliGRAM(s)/deciliter  melatonin 3 milliGRAM(s) Oral at bedtime PRN Insomnia  ondansetron Injectable 4 milliGRAM(s) IV Push every 8 hours PRN Nausea and/or Vomiting  traMADol 25 milliGRAM(s) Oral every 6 hours PRN Moderate Pain (4 - 6)

## 2023-10-29 NOTE — PROGRESS NOTE ADULT - ASSESSMENT
A/P  82 YO M who had an episode of expressive aphasia yesterday while having dinner with family - neurologically back to baseline this morning according to the pt's wife. Pt did not receive TNK as his NIHSS per ED provider was 1. Of note, pt had hx of TIA's in the past, right sided weakness, which completely resolved. This morning pt started to vomit spontaneouly, reports that he feels nauseated only when he wakes up from sleep.  - MRI brain w/o contrast - no acute strokes appreciated. 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  - PT/OT/Speech eval  - rehab consideration  outpatient neurology follow up
84 yo M PMHx HTN, HLD, Hx of TIA with presents with dysarthria since 1 pm on 1/27. Denies any other focal neurological findings.    1- Dysarthria   r/o CVA   vs TIA   MR of brain done showed old pontine infract  neurology following   NIH score was 1, no TPA administered  cont  aspirin 81 mg  As per neurology add Plavix 75mg daily  has been started on atorvastatin   lipide profile done showed LDL of 35  q4h neurochecks showed no defiicit  CTH and CTA head and neck negative   diet ordered able to tolerate  Would get TTE done  will continue with Neuro checks       2-HTN  Permissive HTN  treat if systolic above 180, goal is systolic 160-180  cont carvedilol 3.125 mg BID, amlodipine and Enalapril with holding parameters       3-DM2  on home semaglutide  ISS and POC glucose  hb a1c is 6.4      4-BPH   restart home meds     5. Nausea and vomiting: patient did not have bowel movement for few days, KUB done shows lots of gas, ortiz was given did not help, gave a dose of mag citrate, able to have bowel movements now  he has been given maalox and protonix       DVT prophylaxis       
82 yo M PMHx HTN, HLD, Hx of TIA with presents with dysarthria since 1 pm on 1/27. Denies any other focal neurological findings.    1- Dysarthria   r/o CVA   vs TIA   MR of brain   neurology consult P   ED spoke with Neurology and NIH score was 1, no TPA administered  cont  aspirin 81 mg  and atorvastatin   lipide profile   Admit to telemetry q4h neurochecks  CTH and CTA head and neck negative   diet ordered   passed bedside swallow     2-HTN  Permissive HTN  treat if systolic above 180, goal is systolic 160-180  cont carvedilol 3.125 mg BID    3-DM2  on home semaglutide  ISS and POC glucose      4-BPH   restart home meds       DVT prophylaxis

## 2023-10-30 ENCOUNTER — TRANSCRIPTION ENCOUNTER (OUTPATIENT)
Age: 83
End: 2023-10-30

## 2023-10-30 VITALS
TEMPERATURE: 98 F | OXYGEN SATURATION: 95 % | DIASTOLIC BLOOD PRESSURE: 78 MMHG | RESPIRATION RATE: 18 BRPM | HEART RATE: 75 BPM | SYSTOLIC BLOOD PRESSURE: 150 MMHG

## 2023-10-30 LAB
GLUCOSE BLDC GLUCOMTR-MCNC: 124 MG/DL — HIGH (ref 70–99)
GLUCOSE BLDC GLUCOMTR-MCNC: 124 MG/DL — HIGH (ref 70–99)
GLUCOSE BLDC GLUCOMTR-MCNC: 125 MG/DL — HIGH (ref 70–99)
GLUCOSE BLDC GLUCOMTR-MCNC: 125 MG/DL — HIGH (ref 70–99)

## 2023-10-30 PROCEDURE — 99239 HOSP IP/OBS DSCHRG MGMT >30: CPT

## 2023-10-30 RX ORDER — PANTOPRAZOLE SODIUM 20 MG/1
1 TABLET, DELAYED RELEASE ORAL
Qty: 15 | Refills: 0
Start: 2023-10-30 | End: 2023-11-13

## 2023-10-30 RX ORDER — CLOPIDOGREL BISULFATE 75 MG/1
1 TABLET, FILM COATED ORAL
Qty: 30 | Refills: 0
Start: 2023-10-30 | End: 2023-11-28

## 2023-10-30 RX ADMIN — CLOPIDOGREL BISULFATE 75 MILLIGRAM(S): 75 TABLET, FILM COATED ORAL at 12:21

## 2023-10-30 RX ADMIN — DULOXETINE HYDROCHLORIDE 20 MILLIGRAM(S): 30 CAPSULE, DELAYED RELEASE ORAL at 12:22

## 2023-10-30 RX ADMIN — CARVEDILOL PHOSPHATE 3.12 MILLIGRAM(S): 80 CAPSULE, EXTENDED RELEASE ORAL at 06:08

## 2023-10-30 RX ADMIN — Medication 20 MILLIGRAM(S): at 06:08

## 2023-10-30 RX ADMIN — Medication 650 MILLIGRAM(S): at 12:21

## 2023-10-30 RX ADMIN — PANTOPRAZOLE SODIUM 20 MILLIGRAM(S): 20 TABLET, DELAYED RELEASE ORAL at 06:08

## 2023-10-30 RX ADMIN — AMLODIPINE BESYLATE 5 MILLIGRAM(S): 2.5 TABLET ORAL at 06:08

## 2023-10-30 RX ADMIN — FINASTERIDE 5 MILLIGRAM(S): 5 TABLET, FILM COATED ORAL at 12:21

## 2023-10-30 RX ADMIN — Medication 81 MILLIGRAM(S): at 12:21

## 2023-10-30 NOTE — DISCHARGE NOTE NURSING/CASE MANAGEMENT/SOCIAL WORK - NSDCPEFALRISK_GEN_ALL_CORE
For information on Fall & Injury Prevention, visit: [email or web address] OR  [email or web address] OR  https://www.cdc.gov/steadi/patient.html

## 2023-10-30 NOTE — DISCHARGE NOTE NURSING/CASE MANAGEMENT/SOCIAL WORK - PATIENT PORTAL LINK FT
You can access the FollowMyHealth Patient Portal offered by [facility] by registering at the following website: [email or web address]. By joining [employer]’s FollowMyHealth portal, you will also be able to view your health information using other applications (apps) compatible with our system.

## 2023-11-13 PROCEDURE — 84484 ASSAY OF TROPONIN QUANT: CPT

## 2023-11-13 PROCEDURE — 70498 CT ANGIOGRAPHY NECK: CPT | Mod: MA

## 2023-11-13 PROCEDURE — 70553 MRI BRAIN STEM W/O & W/DYE: CPT | Mod: ME

## 2023-11-13 PROCEDURE — 81001 URINALYSIS AUTO W/SCOPE: CPT

## 2023-11-13 PROCEDURE — 85025 COMPLETE CBC W/AUTO DIFF WBC: CPT

## 2023-11-13 PROCEDURE — 87086 URINE CULTURE/COLONY COUNT: CPT

## 2023-11-13 PROCEDURE — 82962 GLUCOSE BLOOD TEST: CPT

## 2023-11-13 PROCEDURE — 70496 CT ANGIOGRAPHY HEAD: CPT | Mod: MA

## 2023-11-13 PROCEDURE — 80048 BASIC METABOLIC PNL TOTAL CA: CPT

## 2023-11-13 PROCEDURE — 74176 CT ABD & PELVIS W/O CONTRAST: CPT

## 2023-11-13 PROCEDURE — 93005 ELECTROCARDIOGRAM TRACING: CPT

## 2023-11-13 PROCEDURE — 85730 THROMBOPLASTIN TIME PARTIAL: CPT

## 2023-11-13 PROCEDURE — 85610 PROTHROMBIN TIME: CPT

## 2023-11-13 PROCEDURE — 80053 COMPREHEN METABOLIC PANEL: CPT

## 2023-11-13 PROCEDURE — G1004: CPT

## 2023-11-13 PROCEDURE — 70450 CT HEAD/BRAIN W/O DYE: CPT | Mod: MA

## 2023-11-13 PROCEDURE — 93306 TTE W/DOPPLER COMPLETE: CPT

## 2023-11-13 PROCEDURE — 0042T: CPT | Mod: MA

## 2023-11-13 PROCEDURE — 85027 COMPLETE CBC AUTOMATED: CPT

## 2023-11-13 PROCEDURE — 83036 HEMOGLOBIN GLYCOSYLATED A1C: CPT

## 2023-11-13 PROCEDURE — 74018 RADEX ABDOMEN 1 VIEW: CPT

## 2023-11-13 PROCEDURE — 71045 X-RAY EXAM CHEST 1 VIEW: CPT

## 2023-11-13 PROCEDURE — 36415 COLL VENOUS BLD VENIPUNCTURE: CPT

## 2023-11-13 PROCEDURE — 80061 LIPID PANEL: CPT

## 2023-11-13 PROCEDURE — 99285 EMERGENCY DEPT VISIT HI MDM: CPT

## 2024-03-11 ENCOUNTER — APPOINTMENT (OUTPATIENT)
Dept: PULMONOLOGY | Facility: CLINIC | Age: 84
End: 2024-03-11

## 2024-03-27 NOTE — H&P ADULT - HEMATOLOGY/LYMPHATICS
[de-identified] : Last was seen on March 8, 2024 [FreeTextEntry1] : Interval History : ----------------------- was admitted to [facility] 3/20-3/22/2024 with L sided hand weakness leading to diffficulty writing and memory problems.  MRI brain 3/20/204 showed acute cortical infarctions in the R frontal, parietal, occipital cortex and a tiny old hemorrhagic cortical infarct L posterior L parietal lobe. CT angio, no LVO or significant stenosis. Was started on Plavix and prednisone dose was increased to 60 mg a day, received 1 st dose of Actemra on 3/22/2024.  = on this visit today feels better, cognitive function improved, but still difficulty with writing due to left hand weakness = still persistent diffuse headache, but significantly decreased in intensity, no jaw claudication, no vision changes = c/o that his urination increased/ labs were done to r/o DI - reviewed no evidence of , but mild increase in glucose level details…

## 2024-06-27 NOTE — H&P ADULT - GASTROINTESTINAL
Behavior: Behavior normal.        Afebrile blood pressure controlled.  Heart regular lungs are clear.  Medications reviewed as prescribed.  Follow-up visit with me next 2 weeks if need be otherwise sees me in September.  GYN referral today          Plan Per Assessment:  Annie was seen today for dysuria.    Diagnoses and all orders for this visit:    Dysuria  -     POCT Urinalysis no Micro  -     Culture, Urine; Future  -     Culture, Urine    Frequency of micturition  -     Culture, Urine; Future  -     Culture, Urine  -     Comprehensive Metabolic Panel; Future  -     CBC with Auto Differential; Future  -     T4; Future  -     TSH; Future    Bed wetting  -     Comprehensive Metabolic Panel; Future  -     CBC with Auto Differential; Future  -     T4; Future  -     TSH; Future    Irregular menses  -     Iqra Sage MD, OB/GYN, [place] ([facility])  -     Comprehensive Metabolic Panel; Future  -     CBC with Auto Differential; Future  -     T4; Future  -     TSH; Future    Other orders  -     sulfamethoxazole-trimethoprim (BACTRIM DS;SEPTRA DS) 800-160 MG per tablet; Take 1 tablet by mouth 2 times daily for 7 days            No follow-ups on file.      Brent Lozano,     Note was generated with the assistance of voice recognition software.  Document was reviewed however may contain grammatical errors.       details… detailed exam

## 2024-11-15 NOTE — PATIENT PROFILE ADULT - NSPROMUTANXFEARFT_GEN_A_NUR
HPI    Pt states : has been going through immunotherapy & radiation for cancer &   OU have been blurry & irritated was told by Dr Bryant his current OD   said his retina and eye health was good ( 4 months ago) and she   recommended  afts , but did not help and not much she could do w the   blurry ou until done w txt but that was just after the 1st txt , now with   a lot more treatments more irritated . . Not as watery as they were but   very irritated , very gritty   No gtts used currently  No F/F  High Bp in control w meds per pt   Last edited by Jazlyn Marcelo on 11/15/2024  9:35 AM.            Assessment /Plan     For exam results, see Encounter Report.    HCC (hepatocellular carcinoma)  -     Ambulatory referral/consult to Ophthalmology    Nuclear sclerosis of both eyes    Keratoconjunctivitis sicca of both eyes not due to Sjogren's syndrome    Contact blepharoconjunctivitis of both eyes    Refractive error      No signs of iritis or episcleritis, pt has history of dry eyes, also signs of cataract and blepharitis which can contribute to symptoms of vision and discomfort.   Educated pt on presence of cataracts and effects on vision. No surgery at this time. Recheck in one year.  Prev on Restasis, burned eyes and stopped, artificial tears also burn eyes, will try preservative free tears daily   Start ocusoft lid wipes , gave coupon for lid wipes to help  [facility] Rx given, discussed different options for glasses. RTC 1 year routine eye exam.                    N/A

## 2025-03-16 ENCOUNTER — INPATIENT (INPATIENT)
Facility: HOSPITAL | Age: 85
LOS: 1 days | Discharge: HOME | DRG: 305 | End: 2025-03-18
Attending: THORACIC SURGERY (CARDIOTHORACIC VASCULAR SURGERY) | Admitting: THORACIC SURGERY (CARDIOTHORACIC VASCULAR SURGERY)
Payer: MEDICARE

## 2025-03-16 VITALS
WEIGHT: 229.94 LBS | RESPIRATION RATE: 18 BRPM | DIASTOLIC BLOOD PRESSURE: 91 MMHG | SYSTOLIC BLOOD PRESSURE: 212 MMHG | HEIGHT: 70 IN | HEART RATE: 57 BPM | TEMPERATURE: 98 F | OXYGEN SATURATION: 99 %

## 2025-03-16 DIAGNOSIS — I10 ESSENTIAL (PRIMARY) HYPERTENSION: ICD-10-CM

## 2025-03-16 DIAGNOSIS — Z98.89 OTHER SPECIFIED POSTPROCEDURAL STATES: Chronic | ICD-10-CM

## 2025-03-16 DIAGNOSIS — I16.1 HYPERTENSIVE EMERGENCY: ICD-10-CM

## 2025-03-16 DIAGNOSIS — E11.9 TYPE 2 DIABETES MELLITUS WITHOUT COMPLICATIONS: ICD-10-CM

## 2025-03-16 DIAGNOSIS — M48.07 SPINAL STENOSIS, LUMBOSACRAL REGION: Chronic | ICD-10-CM

## 2025-03-16 DIAGNOSIS — E78.0 PURE HYPERCHOLESTEROLEMIA: ICD-10-CM

## 2025-03-16 LAB
ALBUMIN SERPL ELPH-MCNC: 4.2 G/DL — SIGNIFICANT CHANGE UP (ref 3.3–5.2)
ALP SERPL-CCNC: 62 U/L — SIGNIFICANT CHANGE UP (ref 40–120)
ALT FLD-CCNC: 10 U/L — SIGNIFICANT CHANGE UP
ANION GAP SERPL CALC-SCNC: 13 MMOL/L — SIGNIFICANT CHANGE UP (ref 5–17)
APPEARANCE UR: CLEAR — SIGNIFICANT CHANGE UP
APTT BLD: 27.3 SEC — SIGNIFICANT CHANGE UP (ref 24.5–35.6)
BASOPHILS # BLD AUTO: 0.05 K/UL — SIGNIFICANT CHANGE UP (ref 0–0.2)
BASOPHILS NFR BLD AUTO: 0.5 % — SIGNIFICANT CHANGE UP (ref 0–2)
BILIRUB SERPL-MCNC: 0.5 MG/DL — SIGNIFICANT CHANGE UP (ref 0.4–2)
BILIRUB UR-MCNC: NEGATIVE — SIGNIFICANT CHANGE UP
BUN SERPL-MCNC: 17.7 MG/DL — SIGNIFICANT CHANGE UP (ref 8–20)
CALCIUM SERPL-MCNC: 9.3 MG/DL — SIGNIFICANT CHANGE UP (ref 8.4–10.5)
CO2 SERPL-SCNC: 25 MMOL/L — SIGNIFICANT CHANGE UP (ref 22–29)
COLOR SPEC: YELLOW — SIGNIFICANT CHANGE UP
CREAT SERPL-MCNC: 0.98 MG/DL — SIGNIFICANT CHANGE UP (ref 0.5–1.3)
DIFF PNL FLD: NEGATIVE — SIGNIFICANT CHANGE UP
EGFR: 76 ML/MIN/1.73M2 — SIGNIFICANT CHANGE UP
EGFR: 76 ML/MIN/1.73M2 — SIGNIFICANT CHANGE UP
EOSINOPHIL # BLD AUTO: 0.13 K/UL — SIGNIFICANT CHANGE UP (ref 0–0.5)
EOSINOPHIL NFR BLD AUTO: 1.4 % — SIGNIFICANT CHANGE UP (ref 0–6)
GLUCOSE SERPL-MCNC: 125 MG/DL — HIGH (ref 70–99)
GLUCOSE UR QL: NEGATIVE MG/DL — SIGNIFICANT CHANGE UP
HCT VFR BLD CALC: 41.8 % — SIGNIFICANT CHANGE UP (ref 39–50)
HGB BLD-MCNC: 14.3 G/DL — SIGNIFICANT CHANGE UP (ref 13–17)
IMM GRANULOCYTES # BLD AUTO: 0.02 K/UL — SIGNIFICANT CHANGE UP (ref 0–0.07)
IMM GRANULOCYTES NFR BLD AUTO: 0.2 % — SIGNIFICANT CHANGE UP (ref 0–0.9)
INR BLD: 0.98 RATIO — SIGNIFICANT CHANGE UP (ref 0.85–1.16)
KETONES UR-MCNC: NEGATIVE MG/DL — SIGNIFICANT CHANGE UP
LEUKOCYTE ESTERASE UR-ACNC: NEGATIVE — SIGNIFICANT CHANGE UP
LYMPHOCYTES # BLD AUTO: 2.17 K/UL — SIGNIFICANT CHANGE UP (ref 1–3.3)
LYMPHOCYTES NFR BLD AUTO: 23.2 % — SIGNIFICANT CHANGE UP (ref 13–44)
MCHC RBC-ENTMCNC: 31 PG — SIGNIFICANT CHANGE UP (ref 27–34)
MCHC RBC-ENTMCNC: 34.2 G/DL — SIGNIFICANT CHANGE UP (ref 32–36)
MCV RBC AUTO: 90.7 FL — SIGNIFICANT CHANGE UP (ref 80–100)
MONOCYTES # BLD AUTO: 0.68 K/UL — SIGNIFICANT CHANGE UP (ref 0–0.9)
MONOCYTES NFR BLD AUTO: 7.3 % — SIGNIFICANT CHANGE UP (ref 2–14)
NEUTROPHILS # BLD AUTO: 6.32 K/UL — SIGNIFICANT CHANGE UP (ref 1.8–7.4)
NEUTROPHILS NFR BLD AUTO: 67.4 % — SIGNIFICANT CHANGE UP (ref 43–77)
NITRITE UR-MCNC: NEGATIVE — SIGNIFICANT CHANGE UP
NRBC # FLD: 0 K/UL — SIGNIFICANT CHANGE UP (ref 0–0)
NRBC BLD AUTO-RTO: 0 /100 WBCS — SIGNIFICANT CHANGE UP (ref 0–0)
PH UR: 5.5 — SIGNIFICANT CHANGE UP (ref 5–8)
PLATELET # BLD AUTO: 191 K/UL — SIGNIFICANT CHANGE UP (ref 150–400)
PMV BLD: 9.6 FL — SIGNIFICANT CHANGE UP (ref 7–13)
POTASSIUM SERPL-MCNC: 4.5 MMOL/L — SIGNIFICANT CHANGE UP (ref 3.5–5.3)
POTASSIUM SERPL-SCNC: 4.5 MMOL/L — SIGNIFICANT CHANGE UP (ref 3.5–5.3)
PROT SERPL-MCNC: 6.7 G/DL — SIGNIFICANT CHANGE UP (ref 6.6–8.7)
PROT UR-MCNC: NEGATIVE MG/DL — SIGNIFICANT CHANGE UP
PROTHROM AB SERPL-ACNC: 11.1 SEC — SIGNIFICANT CHANGE UP (ref 9.9–13.4)
RBC # FLD: 12.7 % — SIGNIFICANT CHANGE UP (ref 10.3–14.5)
SODIUM SERPL-SCNC: 138 MMOL/L — SIGNIFICANT CHANGE UP (ref 135–145)
SP GR SPEC: >1.03 — HIGH (ref 1–1.03)
TROPONIN T, HIGH SENSITIVITY RESULT: 28 NG/L — SIGNIFICANT CHANGE UP (ref 0–51)
UROBILINOGEN FLD QL: 1 MG/DL — SIGNIFICANT CHANGE UP (ref 0.2–1)
WBC # BLD: 9.37 K/UL — SIGNIFICANT CHANGE UP (ref 3.8–10.5)
WBC # FLD AUTO: 9.37 K/UL — SIGNIFICANT CHANGE UP (ref 3.8–10.5)

## 2025-03-16 PROCEDURE — 71045 X-RAY EXAM CHEST 1 VIEW: CPT | Mod: 26

## 2025-03-16 PROCEDURE — 99285 EMERGENCY DEPT VISIT HI MDM: CPT | Mod: GC

## 2025-03-16 PROCEDURE — 0042T: CPT

## 2025-03-16 PROCEDURE — 93010 ELECTROCARDIOGRAM REPORT: CPT

## 2025-03-16 PROCEDURE — 70498 CT ANGIOGRAPHY NECK: CPT | Mod: 26

## 2025-03-16 PROCEDURE — 70496 CT ANGIOGRAPHY HEAD: CPT | Mod: 26

## 2025-03-16 PROCEDURE — 70450 CT HEAD/BRAIN W/O DYE: CPT | Mod: 26,XU

## 2025-03-16 RX ORDER — NICARDIPINE HCL 30 MG
5 CAPSULE ORAL
Qty: 40 | Refills: 0 | Status: DISCONTINUED | OUTPATIENT
Start: 2025-03-16 | End: 2025-03-17

## 2025-03-16 RX ORDER — MECLIZINE HCL 12.5 MG
25 TABLET ORAL ONCE
Refills: 0 | Status: COMPLETED | OUTPATIENT
Start: 2025-03-16 | End: 2025-03-16

## 2025-03-16 RX ORDER — ONDANSETRON HCL/PF 4 MG/2 ML
4 VIAL (ML) INJECTION ONCE
Refills: 0 | Status: COMPLETED | OUTPATIENT
Start: 2025-03-16 | End: 2025-03-16

## 2025-03-16 RX ADMIN — Medication 3 MILLILITER(S): at 22:24

## 2025-03-16 RX ADMIN — Medication 25 MG/HR: at 21:12

## 2025-03-16 NOTE — ED ADULT NURSE NOTE - NSICDXPASTMEDICALHX_GEN_ALL_CORE_FT
PAST MEDICAL HISTORY:  Diabetes     Guillain Barré syndrome diagnosed oct 2015 treated at [facility]; under care of neurologist.    High cholesterol     Hypertension

## 2025-03-16 NOTE — ED ADULT NURSE NOTE - NSFALLRISKINTERV_ED_ALL_ED

## 2025-03-16 NOTE — H&P ADULT - NSICDXPASTMEDICALHX_GEN_ALL_CORE_FT
PAST MEDICAL HISTORY:  Diabetes     Guillain Barré syndrome diagnosed oct 2015 treated at [facility]; under care of neurologist.    High cholesterol     Hypertension     Hypertension

## 2025-03-16 NOTE — H&P ADULT - NSHPLABSRESULTS_GEN_ALL_CORE
IMPRESSION:    CT HEAD:  Gray/white matter differentiation is preserved. No acute intracranial   hemorrhage.    CTA HEAD:  1. No large vessel occlusion.  2. Deposition of calcified plaque in association with the cavernous and   supraclinoid portions of the bilateral internal carotid arteries with   mild to moderate stenosis bilaterally in these locations.  3. Hypoplastic but patent left A1 segment. Hypoplastic and patent left P1   segment with a left posterior communicating artery which also contributes   to flow within the left posterior cerebral artery.  4. No aneurysm identified. Tiny aneurysms can be beyond the resolution of   CTA technique.    CTA NECK:  No evidence of significant stenosis or occlusion.

## 2025-03-16 NOTE — ED PROVIDER NOTE - ATTENDING CONTRIBUTION TO CARE
85-year-old male history HTN, HLD, TIA presents with word finding difficulties, dizziness and difficulty walking;  Last known normal about 2:30 PM.   pe awake alert heent ncat neck supple cor s1s2 lung clear abd soft nontender neuro   5/5 motor  ; no facial droop; sensation intact; dx hypertensive emergency;  ct cta  of head and neck  bp control and admission

## 2025-03-16 NOTE — ED PROVIDER NOTE - CLINICAL SUMMARY MEDICAL DECISION MAKING FREE TEXT BOX
85-year-old male history HTN, HLD, TIA presents with word finding difficulties associated with dizziness and gait changes.  On exam, patient with mild expressive aphasia, vitals notable for significantly elevated blood pressure.  Evaluate for CVA versus TIA versus other underlying metabolic etiology

## 2025-03-16 NOTE — H&P ADULT - HISTORY OF PRESENT ILLNESS
84yo male with pmhx HTN, HLD, TIA presented to [facility] ED c/o word finding difficulties, dizziness, and gait changes. Found to be significantly hypertensive with SBP in 200s, cardene gtt ordered. CT brain and perfusion in ED negative for acute stroke. BP improved to 170s prior to starting Cardene. Pt transferred to CTICU for further monitoring.     Pt seen and examined at bedside. Reports he began to feel dizzy and nauseous this afternoon around 2:30PM. He subsequently took a nap, and after waking up around 4PM states his son noted some slurring of his speech and took him to the ED. He denies any falls or syncope. No recent sick contacts or illness. Pt now states his dizziness persists but has improved. He states he has been compliant with all of his antihypertensive medications. Denies any current fever, chills, headache, visual changes, chest pain, palpitations, SOB, cough, abdominal pain, N/V/D, urinary symptoms.

## 2025-03-16 NOTE — ED PROVIDER NOTE - OBJECTIVE STATEMENT
85-year-old male history HTN, HLD, TIA presents with word finding difficulties associated with dizziness and gait changes.  Last known normal about 2:30 PM when he went to take a nap, woke up feeling unwell with symptoms.  Patient and family at bedside providing history.  Denies vomiting, fevers, cough, headache.  Code stroke activated

## 2025-03-16 NOTE — ED ADULT NURSE NOTE - NSFALLCONCLUSION_ED_ALL_ED
Admission History and physical  [facility]    Patient: Faisal Sharma Todays Date: 8/3/2021   YOB: 1973 Date of Admission: 8/2/2021   MR Number: [id number]       Admitting Physician:  Rafaela Alvarado NP    Primary Care Provider:  No Pcp    Chief complaint:   Suicidal ideation    IMPRESSION AND PLAN:  The patient is medically stable for admission to the Behavioral Health Unit.  The patient's psychiatric concerns will be managed by Attending Psychiatrist. Patient has to following medical/psychiatric concerns.    1. Suicidal ideation  2. Bipolar 2 disorder  3. \"Spider bite\" verses cellulitis right forearm.  --ultrasound of the soft tissue ordered  --starting doxycycline  --will continue antihistamine, Zyrtec  4.  Chronic pain on Lyrica  5.  Patient states that he has stop smoking, no longer uses hard drugs, no longer using alcohol.  6. Urine toxicology screen positive for marijuana.    HPI:  Faisal Sharma is a 47 year old male who presents with complaints of suicidal ideation, depression.  Patient also offer complaints of a spider bite on his right forearm.  States that he went to the walk-in clinic and was told to use Zyrtec.  He feels that the condition has worsened, more swollen, painful and red.  Denies fever, chills.    Past Medical History:    Past Medical History:   Diagnosis Date   • Anxiety    • Arthritis     \"doctor told me I have arthritis in my back, have the back of a [age] year  old, when I take norco every 6 hours that seems to work   • Attention deficit disorder without mention of hyperactivity    • Bipolar 2 disorder (CMS/[facility])    • Chronic back pain 11/27/2013   • Closed fracture of left elbow with routine healing 9/12/2016   • Depression    • Essential (primary) hypertension    • Gastroesophageal reflux disease    • Insect bite of right forearm 8/3/2021         • Marijuana use 5/3/2021   • Mental disorder    • Migraine    • Obesity (BMI 30-39.9) 12/8/2014   • Other  chronic pain    • Proteinuria 3/31/2014   • Suicidal ideation 11/27/2013   • Tobacco abuse        Past Surgical History:    Past Surgical History:   Procedure Laterality Date   • Ankle fracture surgery Right    • Hernia repair     • Tonsillectomy and adenoidectomy     • Umbilical hernia repair  10/11/2019    Primary Umbilical hernia repair  Dr Joe       Prior to Admission Medications:  Medications Prior to Admission   Medication Sig Dispense Refill   • acetaminophen (TYLENOL) 500 MG tablet Take 1,000 mg by mouth as needed for Pain (and headaches).     • pregabalin (LYRICA) 100 MG capsule Take 1 capsule by mouth 2 times daily. 60 capsule 0   • ARIPiprazole (ABILIFY) 10 MG tablet Take 1 tablet by mouth daily. 30 tablet 0   • clonazePAM (KlonoPIN) 0.5 MG tablet Take 1 tablet by mouth 4 times daily as needed for Anxiety. 56 tablet 0   • venlafaxine XR (EFFEXOR XR) 75 MG 24 hr capsule Take 3 capsules by mouth daily. 90 capsule 0   • amphetamine-dextroamphetamine (Adderall) 20 MG tablet Take 1 tablet by mouth 2 times daily. 30 tablet 0   • EPINEPHrine 0.3 MG/0.3ML auto-injector Inject 0.3 mLs into the muscle as needed for Anaphylaxis (significant allergic reaction). 2 each 2       Allergies:  ALLERGIES:   Allergen Reactions   • Bee Sting ANAPHYLAXIS   • Bee Venom ANAPHYLAXIS   • Cyclobenzaprine RASH   • Ambien Other (See Comments)     Night Terrors   • Zolpidem Other (See Comments)     Night Terrors   • Pollen HEADACHES and Other (See Comments)     Nasal Congestion; Red Skin   • Pollen Extract HEADACHES and PRURITUS     Nasal Congestion; Red Skin       Social History:   reports that he quit smoking about 7 weeks ago. His smoking use included cigarettes. He started smoking about 7 years ago. He smoked 0.25 packs per day. He has quit using smokeless tobacco. He reports previous alcohol use. He reports previous drug use. Drug: Marijuana.    Family History:  family history includes Alcohol Abuse in his brother and  maternal grandfather; Arthritis in his father and mother; Cancer in his maternal grandfather and paternal grandfather; Heart disease in his father; Hypertension in his paternal grandfather; Parkinsonism in his paternal grandfather.    Review of Systems:  The pertinent positives include insect bite on the right forearm which is progressively getting worse, swollen, tender and red.  Otherwise the review of systems is negative except as noted in the HPI in regards to the patient's psychiatric concerns.    General - Denies headache, fever, chills, dizziness, weight loss and fatigue.  Patient endorses a 15 lb weight gain over the last 6 weeks or so.  Eyes - Denies vision changes  ENT - Denies hearing changes, nasal discharge, sinus problems and dysphagia  Pulmonary - Denies cough, SOB, sputum, hemoptysis and wheezing  CV - Denies chest pain, palpitation, orthopnea and edema  GI - Denies nausea, vomiting, diarrhea and constipation, bowel habit changes, hematemesis, melena, hematochezia, reflux   - Denies dysuria, hematuria, polyuria   MS - Denies arthralgias, myalgias  Skin - Denies rash, lesions, skin changes  Neuro - Denies seizures, sensory or motor deficiencies  Lymph/Hem: Denies easy bruising or bleeding or swollen nodes.    Physical Exam:    Vital Most Recent Value 24 Hour Range   Temperature 98.6 °F (37 °C) Temp  Min: 98.6 °F (37 °C)  Max: 98.7 °F (37.1 °C)   Pulse 80 Pulse  Min: 80  Max: 105   Respiratory 17 Resp  Min: 17  Max: 20   Blood Pressure 116/70 BP  Min: 116/70  Max: 195/123   Pulse Oximetry  SpO2  Min: 95 %  Max: 98 %   O2  No data recorded     Vital Most Recent Value First Value   Weight 135.9 kg Weight: 135.7 kg   Height 6' (182.9 cm) Height: 6' (182.9 cm)       General - Patient is alert, in no acute physical distress.  Patient is ambulatory in the [facility], reasonable historian.  HEENT - Normocephalic and atraumatic. Pupils equally round. Sclerae are anicteric. Oropharynx is clear, normal lips and  gums with moist mucous membranes.  No posterior oropharyngeal erythema or exudates or thrush.   Neck - Soft and supple.  No thyromegaly.  Cor - regular rate and rhythm, S1-S2, no murmur .  No peripheral edema.  Pulm - Lungs are clear with nonlabored resting and ambulatory respiratory effort.  Abd - Soft, non-tender and non-distended. Bowel sounds are normoactive. No guarding or rebound tenderness. No palpable masses.  No suprapubic tenderness.  Morbidly obese   - not examined.  Ext  - Warm without clubbing, cyanosis or edema.    M/S -  No erythematous or swollen joints. Independently ambulatory, moving all extremities.  Skin -        Patient is clothed.  Warm and dry skin.  Most skin is tattooed.  Neuro -Alert and oriented to person, place and time. Strength, sensation, and tone are grossly intact. No focal deficits.  Lymph - No adenopathy of the neck.       Laboratory Results:  Ancillary Studies including laboratory results are reviewed as recorded in EPIC 8/3/2021 2:57 PM.  A subset of labs and results are listed below:  lab last 48 hrs;   Recent Results (from the past 48 hour(s))   Light Green Top    Collection Time: 08/02/21  5:23 PM   Result Value Ref Range    Extra Tube Hold for Add Ons    Lavender Top    Collection Time: 08/02/21  5:23 PM   Result Value Ref Range    Extra Tube Hold for Add Ons    Gold Top    Collection Time: 08/02/21  5:23 PM   Result Value Ref Range    Extra Tube Hold for Add Ons    Comprehensive Metabolic Panel    Collection Time: 08/02/21  5:23 PM   Result Value Ref Range    Fasting Status      Sodium 139 135 - 145 mmol/L    Potassium 3.6 3.4 - 5.1 mmol/L    Chloride 104 98 - 107 mmol/L    Carbon Dioxide 25 21 - 32 mmol/L    Anion Gap 14 10 - 20 mmol/L    Glucose 129 (H) 65 - 99 mg/dL    BUN 11 6 - 20 mg/dL    Creatinine 0.74 0.67 - 1.17 mg/dL    Glomerular Filtration Rate >90 >90 mL/min/1.73m2    BUN/ Creatinine Ratio 15 7 - 25    Calcium 9.6 8.4 - 10.2 mg/dL    Bilirubin, Total 0.4 0.2  - 1.0 mg/dL    GOT/AST 18 <=37 Units/L    GPT/ALT 34 <64 Units/L    Alkaline Phosphatase 108 45 - 117 Units/L    Albumin 3.8 3.6 - 5.1 g/dL    Protein, Total 8.1 6.4 - 8.2 g/dL    Globulin 4.3 (H) 2.0 - 4.0 g/dL    A/G Ratio 0.9 (L) 1.0 - 2.4   Acetaminophen Level    Collection Time: 08/02/21  5:23 PM   Result Value Ref Range    Acetaminophen <2 (L) 10 - 30 mcg/mL   Salicylate Level    Collection Time: 08/02/21  5:23 PM   Result Value Ref Range    Salicylate <2.8 <=30.0 mg/dL   Alcohol    Collection Time: 08/02/21  5:23 PM   Result Value Ref Range    Alcohol None Detected None Detected mg/dL   Thyroid Stimulating Hormone    Collection Time: 08/02/21  5:23 PM   Result Value Ref Range    TSH 0.954 0.350 - 5.000 mcUnits/mL   CBC with Automated Differential (performable only)    Collection Time: 08/02/21  5:23 PM   Result Value Ref Range    WBC 11.0 4.2 - 11.0 K/mcL    RBC 5.24 4.50 - 5.90 mil/mcL    HGB 14.9 13.0 - 17.0 g/dL    HCT 45.3 39.0 - 51.0 %    MCV 86.5 78.0 - 100.0 fl    MCH 28.4 26.0 - 34.0 pg    MCHC 32.9 32.0 - 36.5 g/dL    RDW-CV 14.3 11.0 - 15.0 %    RDW-SD 45.1 39.0 - 50.0 fL     140 - 450 K/mcL    NRBC 0 <=0 /100 WBC    Neutrophil, Percent 77 %    Lymphocytes, Percent 14 %    Mono, Percent 7 %    Eosinophils, Percent 1 %    Basophils, Percent 1 %    Immature Granulocytes 0 %    Absolute Neutrophils 8.4 (H) 1.8 - 7.7 K/mcL    Absolute Lymphocytes 1.6 1.0 - 4.8 K/mcL    Absolute Monocytes 0.8 0.3 - 0.9 K/mcL    Absolute Eosinophils  0.1 0.0 - 0.5 K/mcL    Absolute Basophils 0.1 0.0 - 0.3 K/mcL    Absolute Immmature Granulocytes 0.0 0.0 - 0.2 K/[facility]   Drug Abuse Screen, Urine    Collection Time: 08/02/21  5:24 PM   Result Value Ref Range    Amphetamines, Urine Negative Negative    Barbiturates, Urine Negative Negative    Benzodiazepines, Urine Negative Negative    Cocaine/ Metabolite, Urine Negative Negative    Opiates, Urine Negative Negative    Phencyclidine, Urine Negative Negative     Cannabinoids, Urine Positive (A) Negative   Rapid SARS-CoV-2 by PCR    Collection Time: 08/02/21  5:27 PM    Specimen: Nasopharyngeal; Swab   Result Value Ref Range    Rapid SARS-COV-2 by PCR Not Detected Not Detected / Detected / Presumptive Positive / Inhibitors present    Isolation Guidelines      Procedural Comment         Pending labs and procedures:  Unresulted Labs (From admission, onward)    None        Unresulted Procedure (From admission, onward)     Start     Ordered    08/03/21 1455  US UPPER EXTREMITY SOFT TISSUE RIGHT  1 TIME IMAGING,   Routine      08/03/21 1455                  Rafaela Alvarado NP  8/3/2021  2:57 PM       Fall Risk

## 2025-03-16 NOTE — ED ADULT TRIAGE NOTE - CHIEF COMPLAINT QUOTE
family states pt woke up from a nap with confusion, not making sense, went down for a nap at 230pm c/o dizziness  Awake alert, resp wnl, HX stroke

## 2025-03-16 NOTE — H&P ADULT - PROBLEM SELECTOR PLAN 1
SBP found to be in 200s in ED with associated dizziness, word finding difficulty. CT brain/perfusion negative for acute stroke. BP improved to 170s prior to starting vasoactive medications  Admit to CTICU   SBP increased to 200s after transfer to ICU   Start cardene gtt   SBP goals <180   Arterial line for BP monitoring   Case d/w Dr. Rosenthal

## 2025-03-16 NOTE — H&P ADULT - MLM HIDDEN
Doing well with current STEFFI dose but continues to take AM dose only. Tolerating viral illnesses without pulmonary exacerbations. No recurrent abdominal pain or vomiting. We will obtain throat culture today and discuss adding evening ivakaftor dose for regrowth pseudomonas, inadequate weight gain, or pulmonary exacerbations. It is possible that prior sleep changes were developmental and/or other sleep related disorder. She has had no elevation in liver enzymes to suggest hepatotoxicity.       Plan:    Respiratory: a pulmonary exacerbation is absent  -Continue airway clearance: vest BID when well, TID when sick  -Continue additional pulmonary medications: pulmozyme  -HEMT: continue new STEFFI dose >14kg, discussed addition of evening packet if she has regrowth of pseudomonas, inadequate weight gain, or pulmonary exacerbation    GI:  -- continue pancreatic enzyme replacement  -- continue fat soluble vitamins supplements   -- monitor abdominal pain, consider pancreatitis evaluation for unexplained abdominal pain and decreased appetite    Diagnostic evaluation today: throat culture, allergy panel with next lab draw    Follow up in 3 months   yes

## 2025-03-16 NOTE — H&P ADULT - NSHPPHYSICALEXAM_GEN_ALL_CORE
General: NAD, well nourished, laying comfortably in hospital bed  HEENT: NCAT, EOMI, pupils equal and round, hearing intact, mucous membranes moist  Neck: Trachea midline, no JVD   CV: RRR, S1S2, no murmur/rub/gallop  Respiratory: normal effort, CTA b/l, no rales/rhonchi/wheezes  Abdominal: +BS, soft, nontender, nondistended  Extremities: +peripheral pulses, no edema, no cyanosis/clubbing  Neuro: A&Ox3, speech clear and intact, moving all extremities  Psych: appropriate mood and affect

## 2025-03-16 NOTE — ED ADULT TRIAGE NOTE - BEFAST LAST WELL KNOWN
Encounter addended by: Pam Rodriguez RN on: 9/27/2023 4:52 PM   Actions taken: Order list changed
Known

## 2025-03-16 NOTE — ED PROVIDER NOTE - PROGRESS NOTE DETAILS
Discussed CT results with radiology and stroke neurology. Will treat as hypertensive emergency. Given bradycardia, avoid labetalol, will start Cardene gtt. Discussed with CT surg, will admit. Meir Patel MD BP improved prior to vasoactive medication - will continue to monitor with goal SBP <200. Meir Patel MD

## 2025-03-16 NOTE — H&P ADULT - ASSESSMENT
86yo male with pmhx HTN, HLD, TIA presented to [facility] ED c/o word finding difficulties, dizziness, and gait changes. Found to be significantly hypertensive with SBP in 200s, cardene gtt ordered. CT brain and perfusion in ED negative for acute stroke. BP improved to 170s prior to starting Cardene. Pt transferred to CTICU for further monitoring.

## 2025-03-16 NOTE — ED PROVIDER NOTE - PHYSICAL EXAMINATION
General: Awake, alert, lying in bed in NAD  HEENT: Normocephalic, atraumatic. No scleral icterus or conjunctival injection. EOMI. Moist mucous membranes. Oropharynx clear.   Neck:. Soft and supple.  Cardiac: RRR, Peripheral pulses 2+ and symmetric. No LE edema.  Resp: Lungs CTAB. No accessory muscle use  Abd: Soft, non-tender, non-distended. No guarding, rebound, or rigidity.  Back: Spine midline and non-tender.   Skin: No rashes, abrasions, or lacerations.  Neuro: AO x 4. Unsteady gait. CN II-XII intact. 5/5 strength in all extremities. No dysmetria on finger to nose   Psych: Appropriate mood and affect

## 2025-03-17 ENCOUNTER — RESULT REVIEW (OUTPATIENT)
Age: 85
End: 2025-03-17

## 2025-03-17 DIAGNOSIS — I16.0 HYPERTENSIVE URGENCY: ICD-10-CM

## 2025-03-17 DIAGNOSIS — I48.0 PAROXYSMAL ATRIAL FIBRILLATION: ICD-10-CM

## 2025-03-17 LAB
A1C WITH ESTIMATED AVERAGE GLUCOSE RESULT: 6.1 % — HIGH (ref 4–5.6)
ANION GAP SERPL CALC-SCNC: 12 MMOL/L — SIGNIFICANT CHANGE UP (ref 5–17)
BUN SERPL-MCNC: 15.8 MG/DL — SIGNIFICANT CHANGE UP (ref 8–20)
CALCIUM SERPL-MCNC: 8.9 MG/DL — SIGNIFICANT CHANGE UP (ref 8.4–10.5)
CHLORIDE SERPL-SCNC: 101 MMOL/L — SIGNIFICANT CHANGE UP (ref 96–108)
CO2 SERPL-SCNC: 24 MMOL/L — SIGNIFICANT CHANGE UP (ref 22–29)
CREAT SERPL-MCNC: 0.82 MG/DL — SIGNIFICANT CHANGE UP (ref 0.5–1.3)
EGFR: 87 ML/MIN/1.73M2 — SIGNIFICANT CHANGE UP
EGFR: 87 ML/MIN/1.73M2 — SIGNIFICANT CHANGE UP
ESTIMATED AVERAGE GLUCOSE: 128 MG/DL — HIGH (ref 68–114)
GLUCOSE BLDC GLUCOMTR-MCNC: 145 MG/DL — HIGH (ref 70–99)
GLUCOSE BLDC GLUCOMTR-MCNC: 149 MG/DL — HIGH (ref 70–99)
GLUCOSE BLDC GLUCOMTR-MCNC: 150 MG/DL — HIGH (ref 70–99)
GLUCOSE BLDC GLUCOMTR-MCNC: 159 MG/DL — HIGH (ref 70–99)
GLUCOSE SERPL-MCNC: 122 MG/DL — HIGH (ref 70–99)
HCT VFR BLD CALC: 37.2 % — LOW (ref 39–50)
HGB BLD-MCNC: 13 G/DL — SIGNIFICANT CHANGE UP (ref 13–17)
MAGNESIUM SERPL-MCNC: 1.7 MG/DL — LOW (ref 1.8–2.6)
MCHC RBC-ENTMCNC: 31.3 PG — SIGNIFICANT CHANGE UP (ref 27–34)
MCHC RBC-ENTMCNC: 34.9 G/DL — SIGNIFICANT CHANGE UP (ref 32–36)
MCV RBC AUTO: 89.4 FL — SIGNIFICANT CHANGE UP (ref 80–100)
NRBC # BLD AUTO: 0 K/UL — SIGNIFICANT CHANGE UP (ref 0–0)
NRBC # FLD: 0 K/UL — SIGNIFICANT CHANGE UP (ref 0–0)
NRBC BLD AUTO-RTO: 0 /100 WBCS — SIGNIFICANT CHANGE UP (ref 0–0)
PLATELET # BLD AUTO: 170 K/UL — SIGNIFICANT CHANGE UP (ref 150–400)
PMV BLD: 9.7 FL — SIGNIFICANT CHANGE UP (ref 7–13)
POTASSIUM SERPL-MCNC: 4.1 MMOL/L — SIGNIFICANT CHANGE UP (ref 3.5–5.3)
RBC # BLD: 4.16 M/UL — LOW (ref 4.2–5.8)
RBC # FLD: 12.4 % — SIGNIFICANT CHANGE UP (ref 10.3–14.5)
SODIUM SERPL-SCNC: 137 MMOL/L — SIGNIFICANT CHANGE UP (ref 135–145)
WBC # BLD: 8.33 K/UL — SIGNIFICANT CHANGE UP (ref 3.8–10.5)
WBC # FLD AUTO: 8.33 K/UL — SIGNIFICANT CHANGE UP (ref 3.8–10.5)

## 2025-03-17 PROCEDURE — 99292 CRITICAL CARE ADDL 30 MIN: CPT

## 2025-03-17 PROCEDURE — 93306 TTE W/DOPPLER COMPLETE: CPT | Mod: 26

## 2025-03-17 PROCEDURE — 99231 SBSQ HOSP IP/OBS SF/LOW 25: CPT

## 2025-03-17 PROCEDURE — 99223 1ST HOSP IP/OBS HIGH 75: CPT

## 2025-03-17 PROCEDURE — 36620 INSERTION CATHETER ARTERY: CPT | Mod: RT

## 2025-03-17 PROCEDURE — 99222 1ST HOSP IP/OBS MODERATE 55: CPT | Mod: FS,25

## 2025-03-17 PROCEDURE — 99291 CRITICAL CARE FIRST HOUR: CPT

## 2025-03-17 RX ORDER — INSULIN LISPRO 100 U/ML
INJECTION, SOLUTION INTRAVENOUS; SUBCUTANEOUS
Refills: 0 | Status: DISCONTINUED | OUTPATIENT
Start: 2025-03-17 | End: 2025-03-18

## 2025-03-17 RX ORDER — DEXTROSE 50 % IN WATER 50 %
25 SYRINGE (ML) INTRAVENOUS ONCE
Refills: 0 | Status: DISCONTINUED | OUTPATIENT
Start: 2025-03-17 | End: 2025-03-18

## 2025-03-17 RX ORDER — DEXTROSE 50 % IN WATER 50 %
12.5 SYRINGE (ML) INTRAVENOUS ONCE
Refills: 0 | Status: DISCONTINUED | OUTPATIENT
Start: 2025-03-17 | End: 2025-03-18

## 2025-03-17 RX ORDER — EZETIMIBE 10 MG/1
10 TABLET ORAL DAILY
Refills: 0 | Status: DISCONTINUED | OUTPATIENT
Start: 2025-03-17 | End: 2025-03-18

## 2025-03-17 RX ORDER — SODIUM CHLORIDE 9 G/1000ML
1000 INJECTION, SOLUTION INTRAVENOUS
Refills: 0 | Status: DISCONTINUED | OUTPATIENT
Start: 2025-03-17 | End: 2025-03-18

## 2025-03-17 RX ORDER — CARVEDILOL 3.12 MG/1
3.12 TABLET, FILM COATED ORAL EVERY 12 HOURS
Refills: 0 | Status: DISCONTINUED | OUTPATIENT
Start: 2025-03-17 | End: 2025-03-17

## 2025-03-17 RX ORDER — CLOPIDOGREL BISULFATE 75 MG/1
75 TABLET, FILM COATED ORAL DAILY
Refills: 0 | Status: DISCONTINUED | OUTPATIENT
Start: 2025-03-17 | End: 2025-03-18

## 2025-03-17 RX ORDER — GLUCAGON 3 MG/1
1 POWDER NASAL ONCE
Refills: 0 | Status: DISCONTINUED | OUTPATIENT
Start: 2025-03-17 | End: 2025-03-18

## 2025-03-17 RX ORDER — HEPARIN SODIUM 1000 [USP'U]/ML
5000 INJECTION INTRAVENOUS; SUBCUTANEOUS EVERY 8 HOURS
Refills: 0 | Status: DISCONTINUED | OUTPATIENT
Start: 2025-03-17 | End: 2025-03-18

## 2025-03-17 RX ORDER — DEXTROSE 50 % IN WATER 50 %
15 SYRINGE (ML) INTRAVENOUS ONCE
Refills: 0 | Status: DISCONTINUED | OUTPATIENT
Start: 2025-03-17 | End: 2025-03-18

## 2025-03-17 RX ORDER — MAGNESIUM SULFATE 500 MG/ML
2 SYRINGE (ML) INJECTION ONCE
Refills: 0 | Status: COMPLETED | OUTPATIENT
Start: 2025-03-17 | End: 2025-03-17

## 2025-03-17 RX ORDER — ENALAPRIL MALEATE 20 MG
10 TABLET ORAL DAILY
Refills: 0 | Status: DISCONTINUED | OUTPATIENT
Start: 2025-03-17 | End: 2025-03-18

## 2025-03-17 RX ORDER — ENALAPRIL MALEATE 20 MG
20 TABLET ORAL DAILY
Refills: 0 | Status: DISCONTINUED | OUTPATIENT
Start: 2025-03-17 | End: 2025-03-17

## 2025-03-17 RX ORDER — CARVEDILOL 3.12 MG/1
3.12 TABLET, FILM COATED ORAL EVERY 12 HOURS
Refills: 0 | Status: DISCONTINUED | OUTPATIENT
Start: 2025-03-17 | End: 2025-03-18

## 2025-03-17 RX ORDER — TAMSULOSIN HYDROCHLORIDE 0.4 MG/1
0.8 CAPSULE ORAL AT BEDTIME
Refills: 0 | Status: DISCONTINUED | OUTPATIENT
Start: 2025-03-17 | End: 2025-03-18

## 2025-03-17 RX ADMIN — HEPARIN SODIUM 5000 UNIT(S): 1000 INJECTION INTRAVENOUS; SUBCUTANEOUS at 21:39

## 2025-03-17 RX ADMIN — CARVEDILOL 3.12 MILLIGRAM(S): 3.12 TABLET, FILM COATED ORAL at 19:12

## 2025-03-17 RX ADMIN — EZETIMIBE 10 MILLIGRAM(S): 10 TABLET ORAL at 12:09

## 2025-03-17 RX ADMIN — Medication 25 GRAM(S): at 05:13

## 2025-03-17 RX ADMIN — Medication 10 MILLIGRAM(S): at 06:01

## 2025-03-17 RX ADMIN — Medication 3 MILLILITER(S): at 15:09

## 2025-03-17 RX ADMIN — TAMSULOSIN HYDROCHLORIDE 0.8 MILLIGRAM(S): 0.4 CAPSULE ORAL at 21:39

## 2025-03-17 RX ADMIN — Medication 3 MILLILITER(S): at 21:56

## 2025-03-17 RX ADMIN — CLOPIDOGREL BISULFATE 75 MILLIGRAM(S): 75 TABLET, FILM COATED ORAL at 12:08

## 2025-03-17 RX ADMIN — CARVEDILOL 3.12 MILLIGRAM(S): 3.12 TABLET, FILM COATED ORAL at 10:03

## 2025-03-17 RX ADMIN — HEPARIN SODIUM 5000 UNIT(S): 1000 INJECTION INTRAVENOUS; SUBCUTANEOUS at 15:09

## 2025-03-17 RX ADMIN — Medication 3 MILLILITER(S): at 05:01

## 2025-03-17 RX ADMIN — INSULIN LISPRO 2: 100 INJECTION, SOLUTION INTRAVENOUS; SUBCUTANEOUS at 16:52

## 2025-03-17 NOTE — PROCEDURE NOTE - NSINTMANUFACTURE_CARD_ALL_CORE
"Principal Problem:DKA (diabetic ketoacidosis)    Principal Orthopedic Problem: Left thigh abscess    Interval History: NAEON, patient stable, pain controlled. No acute complaints this morning.   Aspiration yesterday appears infected planning I&D today in OR    Review of patient's allergies indicates:  No Known Allergies    Current Facility-Administered Medications   Medication    acetaminophen tablet 1,000 mg    atorvastatin tablet 20 mg    dextrose 10% bolus 125 mL    dextrose 10% bolus 250 mL    dextrose 5 % and 0.45 % NaCl with KCl 20 mEq infusion    insulin detemir U-100 pen 10 Units    insulin regular in 0.9 % NaCl 100 unit/100 mL (1 unit/mL) infusion    iohexoL (OMNIPAQUE 350) injection 50 mL    morphine injection 3 mg    ondansetron injection 4 mg    oxyCODONE immediate release tablet 5 mg    polyethylene glycol packet 17 g    senna-docusate 8.6-50 mg per tablet 1 tablet    sodium chloride 0.9% flush 10 mL    sodium chloride 0.9% flush 10 mL    sodium chloride 0.9% flush 10 mL     Objective:     Vital Signs (Most Recent):  Temp: 98.3 °F (36.8 °C) (07/29/22 0400)  Pulse: 96 (07/29/22 0500)  Resp: (!) 23 (07/29/22 0500)  BP: 127/81 (07/29/22 0500)  SpO2: 97 % (07/29/22 0500)   Vital Signs (24h Range):  Temp:  [97 °F (36.1 °C)-99 °F (37.2 °C)] 98.3 °F (36.8 °C)  Pulse:  [] 96  Resp:  [18-29] 23  SpO2:  [97 %-100 %] 97 %  BP: (107-135)/(72-91) 127/81     Weight: 94.5 kg (208 lb 5.4 oz)  Height: 5' 7" (170.2 cm)  Body mass index is 32.63 kg/m².      Intake/Output Summary (Last 24 hours) at 7/29/2022 0555  Last data filed at 7/29/2022 0546  Gross per 24 hour   Intake 5137.11 ml   Output 2900 ml   Net 2237.11 ml       Ortho/SPM Exam     LLE:  Skin intact  Lateral thigh TTP  Compartments soft  SILT Sa/Musa/DP/SP/T  Motor intact TA/SP/DP  2+ DP/PT  Swelling and erythema over the lateral thigh    Significant Labs: All pertinent labs within the past 24 hours have been reviewed.    Significant Imaging: I have " reviewed and interpreted all pertinent imaging results/findings.   [employer]

## 2025-03-17 NOTE — CONSULT NOTE ADULT - PROBLEM SELECTOR RECOMMENDATION 9
-currently on Cardene drip at 2.5 mg/hr, wean as tolerated  -restart home Coreg 3.125 mg bid and increase Enalapril to 20 mg daily  -add Amlodipine 10 mg daily  -avoid reducing SBP>25% within first 24 hr  -TTE in am

## 2025-03-17 NOTE — CONSULT NOTE ADULT - SUBJECTIVE AND OBJECTIVE BOX
[facility]                                              CARDIOLOGY AT [address], [place] [address]                                             Telephone: [phone]. Fax:[phone]                                                       CARDIOLOGY CONSULTATION NOTE                                                                                             History obtained by: Patient and medical record  Community Cardiologist: Dr. Juice Quintero from [facility]   obtained: Yes [  ] No [ x ]  Reason for Consultation: hypertensive urgency  Available out pt records reviewed: Yes [ x ] No [  ]    Chief complaint:    Patient is a 85y old  Male who presents with a chief complaint of Hypertensive emergency (17 Mar 2025 02:56)      HPI:  This is 84 y/o male with hx of HTN, HLD, pAfib (not on AC), s/p ILR (MDT), CVA, TIA, Guillan-Farmington syndrome 2017, DM2, BPH who presents with word-finding difficulty, dizziness and gait changes. Pt was found to have SBP>200 mmHg and Cardene drip was initiated. Head CT and perfusion negative for acute stroke. Cardiology called for BP management. Pt follows with Dr. Juice Quintero from [facility]. Echocardiogram in 10/2023 showed EF 55-60%, grade 1 DD and no significant valvular abnormalities.      CARDIAC TESTING   ECHO:  < from: TTE Echo Complete w/o Contrast w/ Doppler (10.30.23 @ 12:42) >  Summary:   1. Left ventricular ejection fraction, by visual estimation, is 55 to   60%.   2. Mildly increased LV wall thickness.   3. Spectral Doppler shows impaired relaxation pattern of left   ventricular myocardial filling (Grade I diastolic dysfunction).   4. Mildly reduced RV systolic function.   5. Sclerotic aortic valve with normal opening.   6. Mild pulmonic valve regurgitation.   7. Estimated pulmonary artery systolic pressure is 31.8 mmHg assuming a   right atrial pressure of 3 mmHg, which is consistent with borderline   pulmonary hypertension.   8. There is no evidence of pericardial effusion.   9. Compared to prior study, mild LVH is now noted.    Pattie, Electronically signed on 10/30/2023 at 2:21:47 PM    < end of copied text >      STRESS: n/a    CATH: n/a    ELECTROPHYSIOLOGY: n/a      PAST MEDICAL HISTORY  Hypertension    Guillain Barré syndrome    Diabetes    High cholesterol    Hypertension        PAST SURGICAL HISTORY  H/O bilateral inguinal hernia repair    History of cholecystectomy    Lumbosacral spinal stenosis        SOCIAL HISTORY: lives with wife  CIGARETTES:   former smoker, quit in 1997  ALCOHOL: on occasion  DRUGS: denies    FAMILY HISTORY:  Family history of cancer      Family History of Cardiovascular Disease:  Yes [  ] No [  ]  Coronary Artery Disease in first degree relative: Yes [  ] No [  ]  Sudden Cardiac Death in First degree relative: Yes [  ] No [  ]    HOME MEDICATIONS:  alfuzosin 10 mg oral tablet, extended release: 1 tab(s) orally once a day (27 Oct 2023 21:01)  aspirin 81 mg oral tablet: 1 tab(s) orally once a day (27 Oct 2023 21:01)  baclofen 10 mg oral tablet: 1 tab(s) orally 3 times a day as needed for  muscle spasm (27 Oct 2023 21:01)  carvedilol 3.125 mg oral tablet: 1 tab(s) orally 2 times a day (27 Oct 2023 21:01)  DULoxetine 20 mg oral delayed release capsule: 1 cap(s) orally once a day (27 Oct 2023 21:01)  dutasteride 0.5 mg oral capsule: 1 cap(s) orally once a day (27 Oct 2023 21:01)  enalapril 10 mg oral tablet: 1 tab(s) orally once a day (27 Oct 2023 21:01)  ezetimibe 10 mg oral tablet: 1 tab(s) orally once a day (at bedtime) (27 Oct 2023 21:01)      CURRENT CARDIAC MEDICATIONS:  carvedilol 3.125 milliGRAM(s) Oral every 12 hours  enalapril 20 milliGRAM(s) Oral daily  niCARdipine Infusion 5 mG/Hr IV Continuous <Continuous>      CURRENT OTHER MEDICATIONS:  meclizine 25 milliGRAM(s) Oral Once, Stop order after: 1 Doses  ondansetron Injectable 4 milliGRAM(s) IV Push once, Stop order after: 1 Doses  clopidogrel Tablet 75 milliGRAM(s) Oral daily  dextrose 5%. 1000 milliLiter(s) (100 mL/Hr) IV Continuous <Continuous>  dextrose 5%. 1000 milliLiter(s) (50 mL/Hr) IV Continuous <Continuous>  dextrose 50% Injectable 25 Gram(s) IV Push once, Stop order after: 1 Doses  dextrose 50% Injectable 12.5 Gram(s) IV Push once, Stop order after: 1 Doses  dextrose 50% Injectable 25 Gram(s) IV Push once, Stop order after: 1 Doses  dextrose Oral Gel 15 Gram(s) Oral once, Stop order after: 1 Doses PRN Blood Glucose LESS THAN 70 milliGRAM(s)/deciliter  ezetimibe 10 milliGRAM(s) Oral daily  glucagon  Injectable 1 milliGRAM(s) IntraMuscular once, Stop order after: 1 Doses  insulin lispro (ADMELOG) corrective regimen sliding scale   SubCutaneous three times a day before meals  magnesium sulfate  IVPB 2 Gram(s) IV Intermittent once, Stop order after: 1 Doses  sodium chloride 0.9% lock flush 3 milliLiter(s) IV Push every 8 hours      ALLERGIES:   No Known Allergies      REVIEW OF SYMPTOMS:   CONSTITUTIONAL: No fever, no chills, no weight loss, no weight gain, no fatigue   ENMT:  No vertigo; No sinus or throat pain  NECK: No pain or stiffness  CARDIOVASCULAR: No chest pain, no dyspnea, no syncope/presyncope, no palpitations, no dizziness, no Orthopnea, no Paroxsymal nocturnal dyspnea  RESPIRATORY: no Shortness of breath, no cough, no wheezing  : No dysuria, no hematuria   GI: No dark color stool, no nausea, no diarrhea, no constipation, no abdominal pain   NEURO: No headache, + slurred speech, +dizziness, +gait difficulty  MUSCULOSKELETAL: No joint pain or swelling; No muscle, back, or extremity pain  PSYCH: No agitation, no anxiety.    ALL OTHER REVIEW OF SYSTEMS ARE NEGATIVE.    VITAL SIGNS:  T(C): 36.9 (03-16-25 @ 23:00), Max: 36.9 (03-16-25 @ 21:40)  T(F): 98.5 (03-16-25 @ 23:00), Max: 98.5 (03-16-25 @ 21:40)  HR: 64 (03-17-25 @ 00:00) (57 - 67)  BP: 107/65 (03-17-25 @ 00:00) (107/65 - 212/91)  RR: 15 (03-17-25 @ 00:00) (15 - 18)  SpO2: 99% (03-17-25 @ 00:00) (96% - 99%)    INTAKE AND OUTPUT:     03-16 @ 07:01  -  03-17 @ 03:34  --------------------------------------------------------  IN: 62.5 mL / OUT: 350 mL / NET: -287.5 mL        PHYSICAL EXAM:  Constitutional: Comfortable . No acute distress.   HEENT: Atraumatic and normocephalic , neck is supple . no JVD. No carotid bruit.  CNS: A&Ox3. No focal deficits.   Respiratory: CTAB, unlabored   Cardiovascular: RRR normal s1 s2. No murmurs  Gastrointestinal: Soft, non-tender. +Bowel sounds.   Extremities: 2+ Peripheral Pulses, No clubbing, cyanosis, or edema  Psychiatric: Calm . no agitation.   Skin: Warm and dry, no ulcers on extremities     LABS:                            13.0   8.33  )-----------( 170      ( 17 Mar 2025 02:30 )             37.2     03-17    137  |  101  |  15.8  ----------------------------<  122[H]  4.1   |  24.0  |  0.82    Ca    8.9      17 Mar 2025 02:30  Mg     1.7     03-17    TPro  6.7  /  Alb  4.2  /  TBili  0.5  /  DBili  x   /  AST  17  /  ALT  10  /  AlkPhos  62  03-16    PT/INR - ( 16 Mar 2025 17:50 )   PT: 11.1 sec;   INR: 0.98 ratio         PTT - ( 16 Mar 2025 17:50 )  PTT:27.3 sec  Urinalysis Basic - ( 17 Mar 2025 02:30 )    Color: x / Appearance: x / SG: x / pH: x  Gluc: 122 mg/dL / Ketone: x  / Bili: x / Urobili: x   Blood: x / Protein: x / Nitrite: x   Leuk Esterase: x / RBC: x / WBC x   Sq Epi: x / Non Sq Epi: x / Bacteria: x        INTERPRETATION OF TELEMETRY: SB-SR 50's-60's, occasional PVCs    ECG: SB 59 bpm, 1st degree AVB, RBBB, TWI (unchanged from previous)  Prior ECG: Yes [ x ] No [  ]    RADIOLOGY & ADDITIONAL STUDIES:    X-ray:    CT scan:   < from: CT Angio Brain Stroke Protocol  w/ IV Cont (03.16.25 @ 18:18) >  CTA HEAD:  1. No large vessel occlusion.  2. Deposition of calcified plaque in association with the cavernous and   supraclinoid portions of the bilateral internal carotid arteries with   mild to moderate stenosis bilaterally in these locations.  3. Hypoplastic but patent left A1 segment. Hypoplastic and patent left P1   segment with a left posterior communicating artery which also contributes   to flow within the left posterior cerebral artery.  4. No aneurysm identified. Tiny aneurysms can be beyond the resolution of   CTA technique.    Findings were discussed with Dr. DORCAS BOSS [phone] 3/16/2025 6:38   PM by Dr. Behr Ventura with read back confirmation.    < end of copied text >    MRI:   US:

## 2025-03-17 NOTE — PROGRESS NOTE ADULT - PROBLEM SELECTOR PLAN 1
SBP found to be in 200s in ED with associated dizziness, word finding difficulty. CT brain/perfusion negative for acute stroke. BP improved to 170s prior to starting vasoactive medications  Admitted to CTICU   SBP increased to 200s after transfer to ICU, cardene gtt started   SBP goals <180   Arterial line for BP monitoring   Restart home enalapril and coreg  Wean cardene gtt as tolerated by SBP  Case d/w Dr. Rosenthal  Will discuss plan with CTS attending in AM rounds

## 2025-03-17 NOTE — CONSULT NOTE ADULT - NS ATTEND AMEND GEN_ALL_CORE FT
history of Transient ischemic attack (TIA) . Hypertension.  admtited with confusiona dn word finding difficuklties. found to have hypertensive emergency.   unclear trigger.  complaitn with diet.  not sure if compliatn with meds. but as doug flores he has been takign his meds.   now off cardene drip. Bp controlled.  aback on home meds. enalpril and coreg.    increase as needed. may add NOrvasc if needed.   aspirins tatins.  hx of pAfib documented in outpatient cardiology notes but pt is unaware . s/p loop recorder.  not on anticoagulation   lop checked in hosptial.    oputpatietn followupo with his cardiologist to assess for any loopevents.

## 2025-03-17 NOTE — CONSULT NOTE ADULT - NS ATTEND OPT1 GEN_ALL_CORE
Nasal saline, humidified air, fluids, tessalon pearls, honey for cough.  If you have sinus pain or pressure, or pain while breathing with fever please return to urgent care or go to ER        --------------------------------------------------------------------------------------------------------------  [facility]    Monday - Thursday 8 a.m. - 8 p.m.  *Friday  8 a.m. - 8 p.m.  *Saturday  8 a.m. - 4 p.m.  Sunday   Closed  -*-*-*-*-*-*-*-  The Urgent Care at [facility] ([address] [place])   is now open on SUNDAYS: 8 a.m. to 4 p.m.  -*-*-*-*-*-*-*-  [email or web address]  See current wait times for [facility] in real-time!  Reserve your waiting-room spot in line!   Receive text/email messages if our wait times are long,  so that you can do your waiting at home or work, instead of in our waiting room.     Note: This system does not guarantee an \"appointment time\" to see a provider. Also, patients may be called out of the waiting room \"ahead of turn\" in emergency situations, at discretion of Urgent Care staff.  ----------------  Thank you for choosing [facility] today. We hope you had a pleasant experience and we look forward to serving your future needs.    If you have any questions about your VISIT, please call [phone].    If you have any questions about your BILL, please call [phone].    If you need a copy of your MEDICAL RECORD, please call [phone].    If you receive a survey in the mail about today's services, we hope that you will take a few minutes to let us know about your experience.  --------------------------------------------------------------------------------------------------------------  UNLESS OTHERWISE INSTRUCTED BY YOUR URGENT CARE PROVIDER TODAY, all follow-up for your medical issues should be managed by your primary care provider. The Urgent Care does not manage chronic medical issues or refill medications. You are responsible  for scheduling and keeping any necessary follow-up visits with your primary care provider after this visit today.   --------------------------------------------------------------------------------------------------------------  IF YOU WERE PRESCRIBED AN ANTIBIOTIC TODAY: We recommend taking an over-the-counter probiotic (Such as Florajen 3 for adults, or [id number] for children -- AVAILABLE IN THE [facility] and other local pharmacies too) once a day for the entire duration of your antibiotics, and continuing it for 2 weeks after the antibiotics are finished. This will help reduce your chance of developing antibiotic-related diarrhea and/or yeast infections.  --------------------------------------------------------------------------------------------------------------  IF YOU HAVE LAB RESULTS or XRAY REPORTS STILL PENDING: We typically call patients back only if (1) the results are \"significantly abnormal\", (2) we need to change your treatment plan based on the results, or (3) the report is different than what we told you during your visit. If we have not called you about your results 48 hours after your visit, you can call us at [phone] to check on the status.  --------------------------------------------------------------------------------------------------------------     I independently performed the documented:

## 2025-03-17 NOTE — CONSULT NOTE ADULT - PROBLEM SELECTOR RECOMMENDATION 2
-hx of pAfib documented in outpatient cardiology notes but pt is unaware  -s/p ILR, will need interrogation  -currently SB-SR 50's-60's with occasional PVCs  -[id number] score 6, unclear why pt is not on anticoagulation, denies frequent falls, uses cane for ambulation

## 2025-03-17 NOTE — PROCEDURE NOTE - ADDITIONAL PROCEDURE DETAILS
Battery: Good  R wave: 0.45mV    No recent events recorded. Last event recorded on 10/23/24 @ 10:58AM represents 5 second episode of AT at rate of 167bpm and duration of 5 sec. One other episode of R wave undersensing recorded as pause on 8/20/2024.   No AFib events recorded.   PVC burden: <1%

## 2025-03-18 ENCOUNTER — TRANSCRIPTION ENCOUNTER (OUTPATIENT)
Age: 85
End: 2025-03-18

## 2025-03-18 VITALS
SYSTOLIC BLOOD PRESSURE: 142 MMHG | RESPIRATION RATE: 18 BRPM | DIASTOLIC BLOOD PRESSURE: 77 MMHG | TEMPERATURE: 98 F | HEART RATE: 65 BPM | OXYGEN SATURATION: 100 %

## 2025-03-18 LAB
ANION GAP SERPL CALC-SCNC: 13 MMOL/L — SIGNIFICANT CHANGE UP (ref 5–17)
BUN SERPL-MCNC: 21.3 MG/DL — HIGH (ref 8–20)
CALCIUM SERPL-MCNC: 8.9 MG/DL — SIGNIFICANT CHANGE UP (ref 8.4–10.5)
CHLORIDE SERPL-SCNC: 103 MMOL/L — SIGNIFICANT CHANGE UP (ref 96–108)
CO2 SERPL-SCNC: 25 MMOL/L — SIGNIFICANT CHANGE UP (ref 22–29)
CREAT SERPL-MCNC: 1.17 MG/DL — SIGNIFICANT CHANGE UP (ref 0.5–1.3)
EGFR: 61 ML/MIN/1.73M2 — SIGNIFICANT CHANGE UP
EGFR: 61 ML/MIN/1.73M2 — SIGNIFICANT CHANGE UP
GLUCOSE BLDC GLUCOMTR-MCNC: 163 MG/DL — HIGH (ref 70–99)
GLUCOSE BLDC GLUCOMTR-MCNC: 193 MG/DL — HIGH (ref 70–99)
GLUCOSE SERPL-MCNC: 119 MG/DL — HIGH (ref 70–99)
HCT VFR BLD CALC: 37.5 % — LOW (ref 39–50)
HGB BLD-MCNC: 12.9 G/DL — LOW (ref 13–17)
MAGNESIUM SERPL-MCNC: 1.7 MG/DL — SIGNIFICANT CHANGE UP (ref 1.6–2.6)
MCHC RBC-ENTMCNC: 31 PG — SIGNIFICANT CHANGE UP (ref 27–34)
MCHC RBC-ENTMCNC: 34.4 G/DL — SIGNIFICANT CHANGE UP (ref 32–36)
MCV RBC AUTO: 90.1 FL — SIGNIFICANT CHANGE UP (ref 80–100)
NRBC # BLD AUTO: 0 K/UL — SIGNIFICANT CHANGE UP (ref 0–0)
NRBC # FLD: 0 K/UL — SIGNIFICANT CHANGE UP (ref 0–0)
NRBC BLD AUTO-RTO: 0 /100 WBCS — SIGNIFICANT CHANGE UP (ref 0–0)
PLATELET # BLD AUTO: 150 K/UL — SIGNIFICANT CHANGE UP (ref 150–400)
PMV BLD: 9.7 FL — SIGNIFICANT CHANGE UP (ref 7–13)
POTASSIUM SERPL-MCNC: 4.2 MMOL/L — SIGNIFICANT CHANGE UP (ref 3.5–5.3)
POTASSIUM SERPL-SCNC: 4.2 MMOL/L — SIGNIFICANT CHANGE UP (ref 3.5–5.3)
RBC # BLD: 4.16 M/UL — LOW (ref 4.2–5.8)
RBC # FLD: 12.6 % — SIGNIFICANT CHANGE UP (ref 10.3–14.5)
SODIUM SERPL-SCNC: 141 MMOL/L — SIGNIFICANT CHANGE UP (ref 135–145)
WBC # BLD: 6.85 K/UL — SIGNIFICANT CHANGE UP (ref 3.8–10.5)
WBC # FLD AUTO: 6.85 K/UL — SIGNIFICANT CHANGE UP (ref 3.8–10.5)

## 2025-03-18 PROCEDURE — 0042T: CPT | Mod: MC

## 2025-03-18 PROCEDURE — 85610 PROTHROMBIN TIME: CPT

## 2025-03-18 PROCEDURE — 93005 ELECTROCARDIOGRAM TRACING: CPT

## 2025-03-18 PROCEDURE — 70496 CT ANGIOGRAPHY HEAD: CPT | Mod: MC

## 2025-03-18 PROCEDURE — C1889: CPT

## 2025-03-18 PROCEDURE — 85027 COMPLETE CBC AUTOMATED: CPT

## 2025-03-18 PROCEDURE — 71045 X-RAY EXAM CHEST 1 VIEW: CPT

## 2025-03-18 PROCEDURE — 84484 ASSAY OF TROPONIN QUANT: CPT

## 2025-03-18 PROCEDURE — 80053 COMPREHEN METABOLIC PANEL: CPT

## 2025-03-18 PROCEDURE — 36415 COLL VENOUS BLD VENIPUNCTURE: CPT

## 2025-03-18 PROCEDURE — 85730 THROMBOPLASTIN TIME PARTIAL: CPT

## 2025-03-18 PROCEDURE — 83735 ASSAY OF MAGNESIUM: CPT

## 2025-03-18 PROCEDURE — 70498 CT ANGIOGRAPHY NECK: CPT | Mod: MC

## 2025-03-18 PROCEDURE — 93306 TTE W/DOPPLER COMPLETE: CPT

## 2025-03-18 PROCEDURE — 80048 BASIC METABOLIC PNL TOTAL CA: CPT

## 2025-03-18 PROCEDURE — 99285 EMERGENCY DEPT VISIT HI MDM: CPT

## 2025-03-18 PROCEDURE — 99238 HOSP IP/OBS DSCHRG MGMT 30/<: CPT

## 2025-03-18 PROCEDURE — 82962 GLUCOSE BLOOD TEST: CPT

## 2025-03-18 PROCEDURE — 85025 COMPLETE CBC W/AUTO DIFF WBC: CPT

## 2025-03-18 PROCEDURE — 99291 CRITICAL CARE FIRST HOUR: CPT

## 2025-03-18 PROCEDURE — 70450 CT HEAD/BRAIN W/O DYE: CPT | Mod: MC

## 2025-03-18 PROCEDURE — 83036 HEMOGLOBIN GLYCOSYLATED A1C: CPT

## 2025-03-18 PROCEDURE — 81003 URINALYSIS AUTO W/O SCOPE: CPT

## 2025-03-18 RX ORDER — MAGNESIUM SULFATE 500 MG/ML
2 SYRINGE (ML) INJECTION ONCE
Refills: 0 | Status: COMPLETED | OUTPATIENT
Start: 2025-03-18 | End: 2025-03-18

## 2025-03-18 RX ORDER — EZETIMIBE 10 MG/1
1 TABLET ORAL
Qty: 0 | Refills: 0 | DISCHARGE
Start: 2025-03-18

## 2025-03-18 RX ORDER — CARVEDILOL 3.12 MG/1
1 TABLET, FILM COATED ORAL
Qty: 0 | Refills: 0 | DISCHARGE
Start: 2025-03-18

## 2025-03-18 RX ORDER — ENALAPRIL MALEATE 20 MG
1 TABLET ORAL
Refills: 0 | DISCHARGE

## 2025-03-18 RX ORDER — ALPRAZOLAM 0.5 MG
0.5 TABLET, EXTENDED RELEASE 24 HR ORAL ONCE
Refills: 0 | Status: DISCONTINUED | OUTPATIENT
Start: 2025-03-18 | End: 2025-03-18

## 2025-03-18 RX ORDER — ENALAPRIL MALEATE 20 MG
1 TABLET ORAL
Qty: 0 | Refills: 0 | DISCHARGE
Start: 2025-03-18

## 2025-03-18 RX ORDER — CLOPIDOGREL BISULFATE 75 MG/1
1 TABLET, FILM COATED ORAL
Qty: 0 | Refills: 0 | DISCHARGE
Start: 2025-03-18

## 2025-03-18 RX ADMIN — Medication 10 MILLIGRAM(S): at 06:30

## 2025-03-18 RX ADMIN — INSULIN LISPRO 2: 100 INJECTION, SOLUTION INTRAVENOUS; SUBCUTANEOUS at 08:52

## 2025-03-18 RX ADMIN — Medication 3 MILLILITER(S): at 13:02

## 2025-03-18 RX ADMIN — Medication 3 MILLILITER(S): at 07:57

## 2025-03-18 RX ADMIN — CLOPIDOGREL BISULFATE 75 MILLIGRAM(S): 75 TABLET, FILM COATED ORAL at 13:07

## 2025-03-18 RX ADMIN — Medication 25 GRAM(S): at 13:07

## 2025-03-18 RX ADMIN — HEPARIN SODIUM 5000 UNIT(S): 1000 INJECTION INTRAVENOUS; SUBCUTANEOUS at 13:07

## 2025-03-18 RX ADMIN — HEPARIN SODIUM 5000 UNIT(S): 1000 INJECTION INTRAVENOUS; SUBCUTANEOUS at 06:31

## 2025-03-18 RX ADMIN — INSULIN LISPRO 2: 100 INJECTION, SOLUTION INTRAVENOUS; SUBCUTANEOUS at 11:41

## 2025-03-18 RX ADMIN — EZETIMIBE 10 MILLIGRAM(S): 10 TABLET ORAL at 13:07

## 2025-03-18 RX ADMIN — CARVEDILOL 3.12 MILLIGRAM(S): 3.12 TABLET, FILM COATED ORAL at 06:30

## 2025-03-18 NOTE — PROGRESS NOTE ADULT - SUBJECTIVE AND OBJECTIVE BOX
CRITICAL CARE ATTENDING - CTICU    MEDICATIONS  (STANDING):  carvedilol 3.125 milliGRAM(s) Oral every 12 hours  clopidogrel Tablet 75 milliGRAM(s) Oral daily  dextrose 5%. 1000 milliLiter(s) (100 mL/Hr) IV Continuous <Continuous>  dextrose 5%. 1000 milliLiter(s) (50 mL/Hr) IV Continuous <Continuous>  dextrose 50% Injectable 25 Gram(s) IV Push once  dextrose 50% Injectable 12.5 Gram(s) IV Push once  dextrose 50% Injectable 25 Gram(s) IV Push once  enalapril 10 milliGRAM(s) Oral daily  ezetimibe 10 milliGRAM(s) Oral daily  glucagon  Injectable 1 milliGRAM(s) IntraMuscular once  insulin lispro (ADMELOG) corrective regimen sliding scale   SubCutaneous three times a day before meals  meclizine 25 milliGRAM(s) Oral Once  niCARdipine Infusion 5 mG/Hr (25 mL/Hr) IV Continuous <Continuous>  ondansetron Injectable 4 milliGRAM(s) IV Push once  sodium chloride 0.9% lock flush 3 milliLiter(s) IV Push every 8 hours  tamsulosin 0.8 milliGRAM(s) Oral at bedtime                                    13.0   8.33  )-----------( 170      ( 17 Mar 2025 02:30 )             37.2       03-17    137  |  101  |  15.8  ----------------------------<  122[H]  4.1   |  24.0  |  0.82    Ca    8.9      17 Mar 2025 02:30  Mg     1.7     03-17    TPro  6.7  /  Alb  4.2  /  TBili  0.5  /  DBili  x   /  AST  17  /  ALT  10  /  AlkPhos  62  03-16      PT/INR - ( 16 Mar 2025 17:50 )   PT: 11.1 sec;   INR: 0.98 ratio         PTT - ( 16 Mar 2025 17:50 )  PTT:27.3 sec        Daily Height in cm: 177.8 (16 Mar 2025 17:42)    Daily       03-16 @ 07:01  -  03-17 @ 07:00  --------------------------------------------------------  IN: 330 mL / OUT: 750 mL / NET: -420 mL        Critically Ill patient  : [ ] preoperative ,   [ ] post operative  [x] Non Operative    Requires :  [x] Arterial Line   [ ] Central Line  [ ] PA catheter  [ ] IABP  [ ] ECMO  [ ] LVAD  [ ] Ventilator  [ ] pacemaker [ x]  NC .                      [ x] ABG's     [ x] Pulse Oxymetry Monitoring  Bedside evaluation , monitoring , treatment of hemodynamics , fluids , IVP/ IVCD meds.        Diagnosis:     Admitted - Change in Mental Status     Hypertensive Emergency     Hypertension     Hemodynamic lability,  instability. Requires IVCD [ ] vasopressors [ x]  IV Lopressor  [x ] vasodilator  [ ]IVSS fluid  to maintain MAP, perfusion, C.I.     TIA in past     Respiratory insuffiencey     Requires Supplemental Oxygen Therapy     DM ll - Admelog sliding scale / Diet     ECG     TTE     Requires bedside physical therapy, mobilization and total half-way care.     Cardiology Consultation     Neurology Consultation                     Discussed with CT surgeon, Physician's Assistant - Nurse Practitioner- Critical care medicine team.   Discussed at  AM / PM rounds.   Chart, labs , films reviewed.    Cumulative Critical Care Time Given Today : 105 min
Subjective:   Pt seen and examined at bedside. Reports he began to feel dizzy and nauseous this afternoon around 2:30PM. He subsequently took a nap, and after waking up around 4PM states his son noted some slurring of his speech and took him to the ED. He denies any falls or syncope. No recent sick contacts or illness. Pt now states his dizziness persists but has improved. He states he has been compliant with all of his antihypertensive medications. Denies any current fever, chills, headache, visual changes, chest pain, palpitations, SOB, cough, abdominal pain, N/V/D, urinary symptoms.       PAST MEDICAL & SURGICAL HISTORY:  Hypertension  Guillain Barré syndrome  diagnosed oct 2015 treated at [facility]; under care of neurologist.  Hypertension  High cholesterol  Diabetes  H/O bilateral inguinal hernia repair  History of cholecystectomy  Lumbosacral spinal stenosis  h.o cyst resection, decompression 12/2015      MEDICATIONS  carvedilol 3.125 milliGRAM(s) Oral every 12 hours  clopidogrel Tablet 75 milliGRAM(s) Oral daily  dextrose 5%. 1000 milliLiter(s) IV Continuous <Continuous>  dextrose 5%. 1000 milliLiter(s) IV Continuous <Continuous>  dextrose 50% Injectable 25 Gram(s) IV Push once  dextrose 50% Injectable 12.5 Gram(s) IV Push once  dextrose 50% Injectable 25 Gram(s) IV Push once  dextrose Oral Gel 15 Gram(s) Oral once PRN  enalapril 20 milliGRAM(s) Oral daily  ezetimibe 10 milliGRAM(s) Oral daily  glucagon  Injectable 1 milliGRAM(s) IntraMuscular once  insulin lispro (ADMELOG) corrective regimen sliding scale   SubCutaneous three times a day before meals  meclizine 25 milliGRAM(s) Oral Once  niCARdipine Infusion 5 mG/Hr IV Continuous <Continuous>  ondansetron Injectable 4 milliGRAM(s) IV Push once  sodium chloride 0.9% lock flush 3 milliLiter(s) IV Push every 8 hours    MEDICATIONS  (PRN):  dextrose Oral Gel 15 Gram(s) Oral once PRN Blood Glucose LESS THAN 70 milliGRAM(s)/deciliter    Height (cm): 177.8 (03-16 @ 17:42)  Weight (kg): 104.3 (03-16 @ 17:42)  BMI (kg/m2): 33 (03-16 @ 17:42)  BSA (m2): 2.21 (03-16 @ 17:42)  Daily Height in cm: 177.8 (16 Mar 2025 17:42)    Daily                               13.0   8.33  )-----------( 170      ( 17 Mar 2025 02:30 )             37.2   03-16    138  |  100  |  17.7  ----------------------------<  125[H]  4.5   |  25.0  |  0.98    Ca    9.3      16 Mar 2025 17:50    TPro  6.7  /  Alb  4.2  /  TBili  0.5  /  DBili  x   /  AST  17  /  ALT  10  /  AlkPhos  62  03-16    PT/INR - ( 16 Mar 2025 17:50 )   PT: 11.1 sec;   INR: 0.98 ratio    PTT - ( 16 Mar 2025 17:50 )  PTT:27.3 sec      Objective:  T(C): 36.9 (03-16-25 @ 23:00), Max: 36.9 (03-16-25 @ 21:40)  HR: 64 (03-17-25 @ 00:00) (57 - 67)  BP: 107/65 (03-17-25 @ 00:00) (107/65 - 212/91)  RR: 15 (03-17-25 @ 00:00) (15 - 18)  SpO2: 99% (03-17-25 @ 00:00) (96% - 99%)  Wt(kg): --CAPILLARY BLOOD GLUCOSE      POCT Blood Glucose.: 114 mg/dL (16 Mar 2025 17:49)  POCT Blood Glucose.: 116 mg/dL (16 Mar 2025 17:45)  I&O's Summary    16 Mar 2025 07:01  -  17 Mar 2025 02:57  --------------------------------------------------------  IN: 62.5 mL / OUT: 350 mL / NET: -287.5 mL        Physical Exam  General: NAD, sleeping   Pulm: CTA, equal bilaterally  CV: RRR, +S1S2  Abd: soft, NT, ND, +BS  Ext: +peripheral pulses, no edema  Neuro: A+O x 3, non-focal, speech clear and intact            
                                                         [facility]                                                         CARDIOLOGY AT [facility]                                                                  [address] [place] [address]                                                         Telephone: [phone]. Fax:[phone]                                                                             PROGRESS NOTE    Reason for follow up: HTN urgency  Update: improved       Review of symptoms:   Cardiac:  No chest pain. No dyspnea. No palpitations.  Respiratory: no cough. No dyspnea  Gastrointestinal: No diarrhea. No abdominal pain. No bleeding.   Neuro: No focal neuro complaints.    Vitals:  T(C): 37.2 (03-18-25 @ 08:00), Max: 37.3 (03-18-25 @ 04:00)  HR: 70 (03-18-25 @ 08:00) (57 - 86)  BP: 126/64 (03-18-25 @ 08:00) (94/55 - 141/75)  RR: 9 (03-18-25 @ 08:00) (9 - 26)  SpO2: 96% (03-18-25 @ 08:00) (96% - 100%)  Wt(kg): --  I&O's Summary    17 Mar 2025 07:01  -  18 Mar 2025 07:00  --------------------------------------------------------  IN: 0 mL / OUT: 1510 mL / NET: -1510 mL      Weight (kg): 104.3 (03-16 @ 17:42)    PHYSICAL EXAM:  Appearance: Comfortable. No acute distress  HEENT:  Atraumatic. Normocephalic.  Normal oral mucosa  Neurologic: A & O x 3, no gross focal deficits.  Cardiovascular: RRR S1 S2, No murmur, no rubs/gallops. No JVD  Respiratory: Lungs clear to auscultation, unlabored   Gastrointestinal:  Soft, Non-tender, + BS  Lower Extremities: 2+ Peripheral Pulses, No clubbing, cyanosis, or edema  Psychiatry: Patient is calm. No agitation.   Skin: warm and dry.    CURRENT CARDIAC MEDICATIONS:  carvedilol 3.125 milliGRAM(s) Oral every 12 hours  enalapril 10 milliGRAM(s) Oral daily      CURRENT OTHER MEDICATIONS:  dextrose 50% Injectable 25 Gram(s) IV Push once, Stop order after: 1 Doses  dextrose 50% Injectable 12.5 Gram(s) IV Push once, Stop order after: 1 Doses  dextrose 50% Injectable 25 Gram(s) IV Push once, Stop order after: 1 Doses  dextrose Oral Gel 15 Gram(s) Oral once, Stop order after: 1 Doses PRN Blood Glucose LESS THAN 70 milliGRAM(s)/deciliter  ezetimibe 10 milliGRAM(s) Oral daily  glucagon  Injectable 1 milliGRAM(s) IntraMuscular once, Stop order after: 1 Doses  insulin lispro (ADMELOG) corrective regimen sliding scale   SubCutaneous three times a day before meals  clopidogrel Tablet 75 milliGRAM(s) Oral daily  dextrose 5%. 1000 milliLiter(s) (100 mL/Hr) IV Continuous <Continuous>  dextrose 5%. 1000 milliLiter(s) (50 mL/Hr) IV Continuous <Continuous>  heparin   Injectable 5000 Unit(s) SubCutaneous every 8 hours  magnesium sulfate  IVPB 2 Gram(s) IV Intermittent once, Stop order after: 1 Doses  sodium chloride 0.9% lock flush 3 milliLiter(s) IV Push every 8 hours  tamsulosin 0.8 milliGRAM(s) Oral at bedtime      LABS:	 	                            12.9   6.85  )-----------( 150      ( 18 Mar 2025 03:16 )             37.5     03-18    141  |  103  |  21.3[H]  ----------------------------<  119[H]  4.2   |  25.0  |  1.17    Ca    8.9      18 Mar 2025 03:16  Mg     1.7     03-18    TPro  6.7  /  Alb  4.2  /  TBili  0.5  /  DBili  x   /  AST  17  /  ALT  10  /  AlkPhos  62  03-16    PT/INR/PTT ( 16 Mar 2025 17:50 )                       :                       :      11.1         :       27.3                  .        .                   .              .           .       0.98        .                                       Lipid Profile:   HgA1c:   TSH:     TELEMETRY: SR      DIAGNOSTIC TESTING:  [ ] Echocardiogram:   < from: TTE W or WO Ultrasound Enhancing Agent (03.17.25 @ 00:55) >  CONCLUSIONS:      1. Left ventricular systolic function is hyperdynamic with an ejection fraction visually estimated at >75 %.   2. Mild left ventricular hypertrophy.    < end of copied text >

## 2025-03-18 NOTE — DISCHARGE NOTE PROVIDER - NSDCMRMEDTOKEN_GEN_ALL_CORE_FT
alfuzosin 10 mg oral tablet, extended release: 1 tab(s) orally once a day  aspirin 81 mg oral tablet: 1 tab(s) orally once a day  baclofen 10 mg oral tablet: 1 tab(s) orally 3 times a day as needed for  muscle spasm  carvedilol 3.125 mg oral tablet: 1 tab(s) orally 2 times a day  DULoxetine 20 mg oral delayed release capsule: 1 cap(s) orally once a day  enalapril 10 mg oral tablet: 1 tab(s) orally once a day  ezetimibe 10 mg oral tablet: 1 tab(s) orally once a day (at bedtime)  Plavix 75 mg oral tablet: 1 tab(s) orally once a day  Protonix 40 mg oral delayed release tablet: 1 tab(s) orally once a day   alfuzosin 10 mg oral tablet, extended release: 1 tab(s) orally once a day  aspirin 81 mg oral tablet: 1 tab(s) orally once a day  baclofen 10 mg oral tablet: 1 tab(s) orally 3 times a day as needed for  muscle spasm  carvedilol 3.125 mg oral tablet: 1 tab(s) orally every 12 hours  clopidogrel 75 mg oral tablet: 1 tab(s) orally once a day  DULoxetine 20 mg oral delayed release capsule: 1 cap(s) orally once a day  enalapril 10 mg oral tablet: 1 tab(s) orally once a day  ezetimibe 10 mg oral tablet: 1 tab(s) orally once a day  Protonix 40 mg oral delayed release tablet: 1 tab(s) orally once a day

## 2025-03-18 NOTE — DIETITIAN INITIAL EVALUATION ADULT - PERTINENT MEDS FT
MEDICATIONS  (STANDING):  dextrose 50% Injectable 25 Gram(s) IV Push once  enalapril 10 milliGRAM(s) Oral daily  glucagon  Injectable 1 milliGRAM(s) IntraMuscular once  heparin   Injectable 5000 Unit(s) SubCutaneous every 8 hours  insulin lispro (ADMELOG) corrective regimen sliding scale   SubCutaneous three times a day before meals  magnesium sulfate  IVPB 2 Gram(s) IV Intermittent once  sodium chloride 0.9% lock flush 3 milliLiter(s) IV Push every 8 hours  tamsulosin 0.8 milliGRAM(s) Oral at bedtime

## 2025-03-18 NOTE — DISCHARGE NOTE NURSING/CASE MANAGEMENT/SOCIAL WORK - PATIENT PORTAL LINK FT
You can access the FollowMyHealth Patient Portal offered by [facility] by registering at the following website: [email or web address]. By joining [facility]’s FollowMyHealth portal, you will also be able to view your health information using other applications (apps) compatible with our system.

## 2025-03-18 NOTE — DISCHARGE NOTE PROVIDER - CARE PROVIDER_API CALL
Juice Quintero  Cardiovascular Disease  Follow Up Time: 2 weeks   Juice Quintero  Cardiovascular Disease  Follow Up Time: 2 weeks    Thai Elizondo  Neurology  [address] [place] [address] [place] [address]  Phone: [phone]  Fax: [phone]  Follow Up Time:

## 2025-03-18 NOTE — DIETITIAN INITIAL EVALUATION ADULT - OTHER INFO
Pt is a 85 year old  male with pmhx HTN, HLD, DM2, pAF (not on AC), prior TIA, Guillan-[place] Syndrome, presented to [facility] ED c/o word finding difficulties, dizziness, and gait changes. Pt was found to be significantly hypertensive with SBP >200s in the ED. Code stroke was called. CT head and perfusion were negative for acute stroke. Pt was admitted to CTICU for closer monitoring and cardene gtt was started. SBP improved to 160s with resolution of symptoms. vital  Pt remains hemodynamically stable and is deemed safe for discharge noted.

## 2025-03-18 NOTE — CONSULT NOTE ADULT - ASSESSMENT
ASSESSMENT:   86 y/o Male with PMHx HTN, HLD, chronic small pontine strokes, TIA, presented to [facility] ED on 3/17/25 complaining of altered mental status. Collateral information obtained from wife at bedside who witnessed episode. On day of presentation, at 14:30 patient said, "I'm dizzy and have a headache, I have to lie down." Patient woke up at 17:00 and "seemed off" per wife, he went to the bathroom to get a meclizine, which he is prescribed for vertigo, and "the water just spilled out of the side of his mouth." Per patient's wife, his speech was also slurred and "his legs were like Jello when he stood up." On arrival to ED, patient was a Code Stroke, NIHSS 3 (score of 2 for ataxia in 2 limbs, score of 1 for aphasia). CT brain and perfusion in ED negative for acute stroke. Patient was found to be significantly hypertensive with SBP in 200s, Cardene gtt initiated. Pt transferred to CTICU for further monitoring.     Per ED Provider Note: Tenecteplase deferred per on-call Stroke Neurologist.  No mechanical thrombectomy due to no large vessel occlusion.     At the time of exam, patient has been titrated off of Cardene gtt and is hemodynamically stable. Per patient and his wife, he is adherent to his medications. He was previously seen by Neurology at [facility] in October 2023, MRI at the time showed no acute infarcts but revealed chronic small pontine strokes. Patient was dx with TIA and discharged home on 21-day course of DAPT; per patient and wife, he was transitioned off of DAPT and then placed on ASA monotherapy. Patient reports he was adherent to medication and had no issues on medication but he was switched to Plavix monotherapy by his provider, he is unsure why.     Etiology of symptoms concerning for stroke vs TIA vs hypertensive urgency. Pending MR Head and further evaluation.     NEURO:   -Neurologically with RUE pronator drift and mild Right facial palsy   -Continue close monitoring for neurologic deterioration    -Stroke neuro checks q 2 hours  -Permissive HTN, SBP goal 120-160mmHg avoiding rapid fluctuations and hypotension   -ANTITHROMBOTIC THERAPY: On home Plavix 75mg PO daily; initiate adjuvant ASA 81mg PO daily for DAPT, anticipated course of 21 days.  -STATIN THERAPY: Initiate atorvastatin 80mg PO daily, LDL Pending, titrate statin to LDL goal less than 70  -Obtain Lipid Profile  -Recommend obtaining MR Head No Cont -- discussed w/ ICU team, patient, and wife at bedside. Would prefer MR Head to be done inpatient if possible however if in alignment with patient and family wishes for [facility], could arrange for outpatient MR Head No Cont.   -Dysphagia screen: Not performed   -Physical therapy/OT/Speech eval/treatment  -TTE as noted above   -ILR interrogated by EP, no evidence of afib  -Cardiac monitoring w/ telemetry         -Patient should have all age and risk appropriate malignancy screenings with PCP or sooner if clinically suspected   -DVT ppx: No neurological contraindication to pharmacological DVT ppx   -Maintain adequate hydration    -Monitor for si/sx of infection   -Obtain HgA1c 6.1  -Stroke Education     OTHER:   Condition and plan of care discussed with patient, family at bedside, primary team, questions and concerns addressed.   On discharge, patient should be referred to follow up with Stroke Neurologist Dr. Elizondo     DISPOSITION: Per primary team      CORE MEASURES:       Admission NIHSS: 3     Tenecteplase : [] YES [x] NO     LDL/A1C: PENDING/6.1     Depression Screen- if depression hx and/or present     Statin Therapy: As above     Dysphagia Screen: [] PASS [] FAIL [x] NOT PERFORMED     Smoking in the past 12 months [] YES [x] NO     Afib [] YES [x] NO     Diabetes [] YES [x] NO     Stroke Education [] YES [] NO [x] PENDING  
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 Pt was found to have SBP>200 mmHg and Cardene drip was initiated. Head CT and perfusion negative for acute stroke. Cardiology called for BP management. Pt follows with Dr. Juice Quintero from [facility]. Echocardiogram in 10/2023 showed EF 55-60%, grade 1 DD and no significant valvular abnormalities.

## 2025-03-18 NOTE — CONSULT NOTE ADULT - CRITICAL CARE ATTENDING COMMENT
85M with multiple vascular risk factors, prior pontine stroke, unclear med adherence here with dysarthria with concern for hypertensive emergency, placed in CTICU on cardene gtt   CTH unrevealing for new infarct - though has slight R hemiparesis o/e - unclear if acute vs chronic, pt denies any deficits  Was on Plavix for old stroke, unclear why not on Aspirin, pt does not know  Will tx as minor stroke per chance, would avoid precipitously dropping BP  agree with plan above  d/w CTICU

## 2025-03-18 NOTE — DISCHARGE NOTE PROVIDER - HOSPITAL COURSE
84yo male with pmhx HTN, HLD, DM2, pAF (not on AC), prior TIA, Guillan-[place] Syndrome, presented to [facility] ED c/o word finding difficulties, dizziness, and gait changes. Pt was found to be significantly hypertensive with SBP >200s in the ED. Code stroke was called. CT head and perfusion were negative for acute stroke. Pt was admitted to CTICU for closer monitoring and cardene gtt was started. SBP improved to 160s with resolution of symptoms. Cardiology was consulted for medication recommendations. Home enalapril and coreg were restarted with further reduction of SBP to 120-140s. Remainder of vital signs stable. Pt remains hemodynamically stable and is deemed safe for discharge as per Dr. Rosenthal.

## 2025-03-18 NOTE — DIETITIAN INITIAL EVALUATION ADULT - NS FNS DIET ORDER
Diet, DASH/TLC:   Sodium & Cholesterol Restricted  Consistent Carbohydrate {Evening Snack} (CSTCHOSN) (03-17-25 @ 00:07)

## 2025-03-18 NOTE — DISCHARGE NOTE PROVIDER - PROVIDER TOKENS
PROVIDER:[TOKEN:[[id number]],FOLLOWUP:[2 weeks]] PROVIDER:[TOKEN:[[id number]],FOLLOWUP:[2 weeks]],PROVIDER:[TOKEN:[[id number]]]

## 2025-03-18 NOTE — PROGRESS NOTE ADULT - ASSESSMENT
84 y/o male with hx of HTN, HLD, pAfib (not on AC), s/p ILR (MDT), CVA, TIA, Guillan-[place] syndrome 2017, DM2, BPH who presents with word-finding difficulty, dizziness and gait changes. Pt was found to have SBP>200 mmHg and Cardene drip was initiated. Head CT and perfusion negative for acute stroke. Cardiology called for BP management. Pt follows with Dr. Juice Quintero from [facility]. Echocardiogram in 10/2023 showed EF 55-60%, grade 1 DD and no significant valvular abnormalities.
84yo male with pmhx HTN, HLD, TIA presented to [facility] ED c/o word finding difficulties, dizziness, and gait changes. Found to be significantly hypertensive with SBP in 200s, cardene gtt ordered. CT brain and perfusion in ED negative for acute stroke. BP improved to 170s prior to starting Cardene. Pt transferred to CTICU for further monitoring.

## 2025-03-18 NOTE — PHARMACOTHERAPY INTERVENTION NOTE - COMMENTS
Medication reconciliation completed using DrFirst. Updated patient's OMR.  Please note, patient takes the following:    HOME MEDICATIONS:  alfuzosin 10 mg oral tablet, extended release: 1 tab(s) orally once a day (17 Mar 2025 11:23)  aspirin 81 mg oral tablet: 1 tab(s) orally once a day (17 Mar 2025 11:23)  baclofen 10 mg oral tablet: 1 tab(s) orally 3 times a day as needed for  muscle spasm (17 Mar 2025 11:23)  carvedilol 3.125 mg oral tablet: 1 tab(s) orally 2 times a day (17 Mar 2025 11:23)  DULoxetine 20 mg oral delayed release capsule: 1 cap(s) orally once a day (17 Mar 2025 11:23)  enalapril 10 mg oral tablet: 1 tab(s) orally once a day (17 Mar 2025 11:26)  ezetimibe 10 mg oral tablet: 1 tab(s) orally once a day (at bedtime) (17 Mar 2025 11:23)  Plavix 75 mg oral tablet: 1 tab(s) orally once a day (17 Mar 2025 11:23)  Protonix 40 mg oral delayed release tablet: 1 tab(s) orally once a day (17 Mar 2025 11:23)   Medication reconciliation completed using DrFirst. Updated patient's OMR.  Please note, patient takes the following:     HOME MEDICATIONS:  alfuzosin 10 mg oral tablet, extended release: 1 tab(s) orally once a day (17 Mar 2025 11:23)  aspirin 81 mg oral tablet: 1 tab(s) orally once a day (17 Mar 2025 11:23)  baclofen 10 mg oral tablet: 1 tab(s) orally 3 times a day as needed for  muscle spasm (17 Mar 2025 11:23)  carvedilol 3.125 mg oral tablet: 1 tab(s) orally 2 times a day (17 Mar 2025 11:23)  DULoxetine 20 mg oral delayed release capsule: 1 cap(s) orally once a day (17 Mar 2025 11:23)  enalapril 10 mg oral tablet: 1 tab(s) orally once a day (17 Mar 2025 11:26)  ezetimibe 10 mg oral tablet: 1 tab(s) orally once a day (at bedtime) (17 Mar 2025 11:23)  Plavix 75 mg oral tablet: 1 tab(s) orally once a day (17 Mar 2025 11:23)  Protonix 40 mg oral delayed release tablet: 1 tab(s) orally once a day (17 Mar 2025 11:23)

## 2025-03-18 NOTE — PHARMACOTHERAPY INTERVENTION NOTE - INTERVENTION CATEGORIES
Per [facility] and [facility]:     Magnet ok to use for cautery. While magnet is in place, shock therapy is inhibited. Device beeps once per second, which indicates the magnet is in the correct spot. Magnet does not change pacing therapy, the pacemaker will remain in demand mode (it is not forced to pace at a given rate). Once magnet is removed, original function of the ICD resumes.
Med Reconciliation
Therapy

## 2025-03-18 NOTE — DISCHARGE NOTE PROVIDER - NSDCCPCAREPLAN_GEN_ALL_CORE_FT
PRINCIPAL DISCHARGE DIAGNOSIS  Diagnosis: Hypertensive emergency  Assessment and Plan of Treatment: You have hypertension, or high blood pressure. It is very important to continue your medication as ordered. High blood pressure increases your risk for heart attack, stroke and kidney disease. It does not usually cause symptoms but it can be serious.   1. Be sure to take all of your medication as prescribed.  2. You may find it helpful to get a home blood pressure meter and check your blood pressure periodically.  3. Lifestyle changes can improve your blood pressure and lessen the amount of medication you need, such as: losing weight, choosing a diet low in fat and rich in fruits, vegetables and low-fat dairy products, reducing the amount of salt you eat, cut down on alcohol (to less than two drinks per day) and do something active most days for 30 minutes or more.      SECONDARY DISCHARGE DIAGNOSES  Diagnosis: High cholesterol  Assessment and Plan of Treatment: Please continue your home medications as prescribed    Diagnosis: Diabetes  Assessment and Plan of Treatment: Please continue your home diabetic medications as prescribed

## 2025-03-18 NOTE — PROGRESS NOTE ADULT - PROBLEM SELECTOR PLAN 1
-Admission   -S/p cardene gtt  -Now normotensive with resumption of home medication regimen -Admission   -S/p cardene gtt  -Now normotensive with resumption of home medication regimen    Signing off  follow up with [facility] cardiologist

## 2025-03-18 NOTE — PROGRESS NOTE ADULT - PROBLEM SELECTOR PLAN 2
zetia
-hx of pAfib documented in outpatient cardiology notes but pt is unaware  -Remains in NSR  -ILR interrogation with no afib

## 2025-03-18 NOTE — DIETITIAN INITIAL EVALUATION ADULT - PERTINENT LABORATORY DATA
03-18    141  |  103  |  21.3[H]  ----------------------------<  119[H]  4.2   |  25.0  |  1.17    Ca    8.9      18 Mar 2025 03:16  Mg     1.7     03-18    TPro  6.7  /  Alb  4.2  /  TBili  0.5  /  DBili  x   /  AST  17  /  ALT  10  /  AlkPhos  62  03-16  POCT Blood Glucose.: 163 mg/dL (03-18-25 @ 08:48)  A1C with Estimated Average Glucose Result: 6.1 % (03-17-25 @ 02:30)

## 2025-03-18 NOTE — DIETITIAN INITIAL EVALUATION ADULT - NSICDXPASTMEDICALHX_GEN_ALL_CORE_FT
PAST MEDICAL HISTORY:  Diabetes     Guillain Barré syndrome diagnosed oct 2015 treated at [facility]; under care of neurologist.    High cholesterol     Hypertension     Hypertension

## 2025-03-18 NOTE — CONSULT NOTE ADULT - SUBJECTIVE AND OBJECTIVE BOX
Preliminary note, official note pending attending review/signature.  INCOMPLETE/PENDING                               [facility] Stroke Team  CC:  HPI:  84yo male with pmhx HTN, HLD, TIA presented to [facility] ED c/o word finding difficulties, dizziness, and gait changes. Found to be significantly hypertensive with SBP in 200s, cardene gtt ordered. CT brain and perfusion in ED negative for acute stroke. BP improved to 170s prior to starting Cardene. Pt transferred to CTICU for further monitoring.     Pt seen and examined at bedside. Reports he began to feel dizzy and nauseous this afternoon around 2:30PM. He subsequently took a nap, and after waking up around 4PM states his son noted some slurring of his speech and took him to the ED. He denies any falls or syncope. No recent sick contacts or illness. Pt now states his dizziness persists but has improved. He states he has been compliant with all of his antihypertensive medications. Denies any current fever, chills, headache, visual changes, chest pain, palpitations, SOB, cough, abdominal pain, N/V/D, urinary symptoms.  (16 Mar 2025 23:09)  The patient is a 85y Male who presented with    Stroke risk factors:    PAST MEDICAL & SURGICAL HISTORY:  Hypertension  Guillain Barré syndrome  diagnosed oct 2015 treated at [facility]; under care of neurologist.  Diabetes  High cholesterol  Hypertension  CVA (cerebrovascular accident)  H/O bilateral inguinal hernia repair  History of cholecystectomy  Lumbosacral spinal stenosis  h.o cyst resection, decompression 12/2015      MEDICATIONS:  MEDICATIONS  (STANDING):  carvedilol 3.125 milliGRAM(s) Oral every 12 hours  clopidogrel Tablet 75 milliGRAM(s) Oral daily  dextrose 5%. 1000 milliLiter(s) (100 mL/Hr) IV Continuous <Continuous>  dextrose 5%. 1000 milliLiter(s) (50 mL/Hr) IV Continuous <Continuous>  dextrose 50% Injectable 25 Gram(s) IV Push once  dextrose 50% Injectable 12.5 Gram(s) IV Push once  dextrose 50% Injectable 25 Gram(s) IV Push once  enalapril 10 milliGRAM(s) Oral daily  ezetimibe 10 milliGRAM(s) Oral daily  glucagon  Injectable 1 milliGRAM(s) IntraMuscular once  heparin   Injectable 5000 Unit(s) SubCutaneous every 8 hours  insulin lispro (ADMELOG) corrective regimen sliding scale   SubCutaneous three times a day before meals  magnesium sulfate  IVPB 2 Gram(s) IV Intermittent once  sodium chloride 0.9% lock flush 3 milliLiter(s) IV Push every 8 hours  tamsulosin 0.8 milliGRAM(s) Oral at bedtime    MEDICATIONS  (PRN):  dextrose Oral Gel 15 Gram(s) Oral once PRN Blood Glucose LESS THAN 70 milliGRAM(s)/deciliter      Allergies    No Known Allergies    Intolerances        SOCIAL HISTORY:  no tob,   no alcohol   no drugs    FAMILY HISTORY:  Family history of cancer          ROS: 14 point ROS negative other than what is present in HPI or below    Vital Signs Last 24 Hrs  T(C): 37.2 (18 Mar 2025 08:00), Max: 37.3 (18 Mar 2025 04:00)  T(F): 98.9 (18 Mar 2025 08:00), Max: 99.1 (18 Mar 2025 04:00)  HR: 70 (18 Mar 2025 10:00) (57 - 86)  BP: 112/73 (18 Mar 2025 09:00) (94/55 - 139/67)  BP(mean): 85 (18 Mar 2025 09:00) (60 - 101)  RR: 15 (18 Mar 2025 10:00) (9 - 26)  SpO2: 96% (18 Mar 2025 10:00) (96% - 100%)    Parameters below as of 18 Mar 2025 08:00  Patient On (Oxygen Delivery Method): room air          General: NAD    Detailed Neurologic Exam:    Mental status: The patient is awake and alert and has normal attention span.  The patient is fully oriented in 3 spheres. The patient is oriented to current events. The patient is able to name objects, follow commands, repeat sentences.    Cranial nerves: Pupils equal and react symmetrically to light. There is no visual field deficit to confrontation. Extraocular motion is full with no nystagmus. There is no ptosis. Facial sensation is intact. Facial musculature is symmetric. Palate elevates symmetrically. Tongue is midline.    Motor: There is normal bulk and tone.  There is no tremor.  Strength is 5/5 in the right arm and leg.   Strength is 5/5 in the left arm and leg.    Sensation: Intact to light touch and pin in 4 extremities    Reflexes: 1-2+ throughout and plantar responses are flexor.    Cerebellar: There is no dysmetria on finger to nose testing.    Gait : deferred    [facility] SS:  DATE:  TIME:  1A: Level of consciousness (0-3):   1B: Questions (0-2):   1C: Commands (0-2):   2: Gaze (0-2):   3: Visual fields (0-3):   4: Facial palsy (0-3):   MOTOR:  5A: Left arm motor drift (0-4):   5B: Right arm motor drift (0-4):   6A: Left leg motor drift (0-4):   6B: Right leg motor drift (0-4):   7: Limb ataxia (0-2):   SENSORY:  8: Sensation (0-2):   SPEECH:  9: Language (0-3):   10: Dysarthria (0-2):   EXTINCTION:  11: Extinction/inattention (0-2):     TOTAL SCORE:     prehospital mRS=      LABS:                         12.9   6.85  )-----------( 150      ( 18 Mar 2025 03:16 )             37.5       03-18    141  |  103  |  21.3[H]  ----------------------------<  119[H]  4.2   |  25.0  |  1.17    Ca    8.9      18 Mar 2025 03:16  Mg     1.7     03-18    TPro  6.7  /  Alb  4.2  /  TBili  0.5  /  DBili  x   /  AST  17  /  ALT  10  /  AlkPhos  62  03-16      PT/INR - ( 16 Mar 2025 17:50 )   PT: 11.1 sec;   INR: 0.98 ratio         PTT - ( 16 Mar 2025 17:50 )  PTT:27.3 sec    Lipid panel:    HgbA1c:      RADIOLOGY & ADDITIONAL STUDIES (independently reviewed unless otherwise noted):  CT head:  CTA head: no aneurysm, AVM, LVO or sig stenosis in COW  CTA neck: no sig carotid or vertebral stenosis  CT Perfusion head - CBF<30% volume 0ml, Tmax>6s volume =0ml Preliminary note, official note pending attending review/signature.  INCOMPLETE/PENDING                               [facility] Stroke Team    CC: AMS, Code Stroke  HPI:  86 y/o Male with PMHx HTN, HLD, chronic small pontine strokes, TIA, presented to [facility] ED on 3/17/25 complaining of altered mental status. Collateral information obtained from wife at bedside who witnessed episode. On day of presentation, at 14:30 patient said, "I'm dizzy and have a headache, I have to lie down." Patient woke up at 17:00 and "seemed off" per wife, he went to the bathroom to get a meclizine, which he is prescribed for vertigo, and "the water just spilled out of the side of his mouth." Per patient's wife, his speech was also slurred and "his legs were like Jello when he stood up." On arrival to ED, patient was a Code Stroke, NIHSS 3 (score of 2 for ataxia in 2 limbs, score of 1 for aphasia). CT brain and perfusion in ED negative for acute stroke. Patient was found to be significantly hypertensive with SBP in 200s, Cardene gtt initiated. Pt transferred to CTICU for further monitoring.     At the time of exam, patient has been titrated off of Cardene gtt and is hemodynamically stable. Per patient and his wife, he is adherent to his medications. He was previously seen by Neurology at [facility] in October 2023, MRI at the time showed no acute infarcts but revealed chronic small pontine strokes. Patient was dx with TIA and discharged home on 21-day course of DAPT; per patient and wife, he was transitioned off of DAPT and then placed on ASA monotherapy. Patient reports he was adherent to medication and had no issues on medication but he was switched to Plavix monotherapy by his provider, he is unsure why.     PAST MEDICAL & SURGICAL HISTORY:  Hypertension  Guillain Barré syndrome  diagnosed oct 2015 treated at [facility]; under care of neurologist.  Diabetes  High cholesterol  Hypertension  CVA (cerebrovascular accident)  H/O bilateral inguinal hernia repair  History of cholecystectomy  Lumbosacral spinal stenosis  h.o cyst resection, decompression 12/2015      MEDICATIONS:  MEDICATIONS  (STANDING):  carvedilol 3.125 milliGRAM(s) Oral every 12 hours  clopidogrel Tablet 75 milliGRAM(s) Oral daily  dextrose 5%. 1000 milliLiter(s) (100 mL/Hr) IV Continuous <Continuous>  dextrose 5%. 1000 milliLiter(s) (50 mL/Hr) IV Continuous <Continuous>  dextrose 50% Injectable 25 Gram(s) IV Push once  dextrose 50% Injectable 12.5 Gram(s) IV Push once  dextrose 50% Injectable 25 Gram(s) IV Push once  enalapril 10 milliGRAM(s) Oral daily  ezetimibe 10 milliGRAM(s) Oral daily  glucagon  Injectable 1 milliGRAM(s) IntraMuscular once  heparin   Injectable 5000 Unit(s) SubCutaneous every 8 hours  insulin lispro (ADMELOG) corrective regimen sliding scale   SubCutaneous three times a day before meals  magnesium sulfate  IVPB 2 Gram(s) IV Intermittent once  sodium chloride 0.9% lock flush 3 milliLiter(s) IV Push every 8 hours  tamsulosin 0.8 milliGRAM(s) Oral at bedtime    MEDICATIONS  (PRN):  dextrose Oral Gel 15 Gram(s) Oral once PRN Blood Glucose LESS THAN 70 milliGRAM(s)/deciliter    Allergies  No Known Allergies    Intolerances  N/A    SOCIAL HISTORY:  Denies tobacco, alcohol, drug use    FAMILY HISTORY:  Family history of cancer      ROS: 14 point ROS negative other than what is present in HPI or below    Vital Signs Last 24 Hrs  T(C): 37.2 (18 Mar 2025 08:00), Max: 37.3 (18 Mar 2025 04:00)  T(F): 98.9 (18 Mar 2025 08:00), Max: 99.1 (18 Mar 2025 04:00)  HR: 70 (18 Mar 2025 10:00) (57 - 86)  BP: 112/73 (18 Mar 2025 09:00) (94/55 - 139/67)  BP(mean): 85 (18 Mar 2025 09:00) (60 - 101)  RR: 15 (18 Mar 2025 10:00) (9 - 26)  SpO2: 96% (18 Mar 2025 10:00) (96% - 100%)    Parameters below as of 18 Mar 2025 08:00  Patient On (Oxygen Delivery Method): room air      PHYSICAL EXAM:  General: NAD    Detailed Neurologic Exam:    Mental status: The patient is awake and alert and has normal attention span.  The patient is fully oriented in 3 spheres. The patient is oriented to current events. The patient is able to name objects, follow commands, repeat sentences.    Cranial nerves: Pupils equal and react symmetrically to light. There is no visual field deficit to confrontation. Extraocular motion is full with no nystagmus. There is no ptosis. Facial sensation is intact. Mild Right facial palsy. Palate elevates symmetrically. Tongue is midline.    Motor: There is normal bulk and tone.  There is no tremor.  Strength is 5/5 in the right arm and leg. +RUE pronator drift   Strength is 5/5 in the left arm and leg.    Sensation: Intact to light touch and pin in 4 extremities    Reflexes: 1-2+ throughout and plantar responses are flexor.    Cerebellar: There is no dysmetria on finger to nose testing.    Gait : deferred    [facility] SS:  DATE: 3/18/25  TIME: 10:30  1A: Level of consciousness (0-3): 0  1B: Questions (0-2): 0  1C: Commands (0-2): 0  2: Gaze (0-2): 0  3: Visual fields (0-3): 0  4: Facial palsy (0-3): 1  MOTOR:  5A: Left arm motor drift (0-4): 0  5B: Right arm motor drift (0-4): 0  6A: Left leg motor drift (0-4): 0  6B: Right leg motor drift (0-4): 0  7: Limb ataxia (0-2): 0  SENSORY:  8: Sensation (0-2): 0  SPEECH:  9: Language (0-3): 0  10: Dysarthria (0-2): 0  EXTINCTION:  11: Extinction/inattention (0-2): 0    TOTAL SCORE: 0  prehospital mRS=      LABS:                         12.9   6.85  )-----------( 150      ( 18 Mar 2025 03:16 )             37.5       03-18    141  |  103  |  21.3[H]  ----------------------------<  119[H]  4.2   |  25.0  |  1.17    Ca    8.9      18 Mar 2025 03:16  Mg     1.7     03-18    TPro  6.7  /  Alb  4.2  /  TBili  0.5  /  DBili  x   /  AST  17  /  ALT  10  /  AlkPhos  62  03-16      PT/INR - ( 16 Mar 2025 17:50 )   PT: 11.1 sec;   INR: 0.98 ratio         PTT - ( 16 Mar 2025 17:50 )  PTT:27.3 sec    Lipid panel:    HgbA1c:      RADIOLOGY & ADDITIONAL STUDIES (independently reviewed unless otherwise noted):  CT head:  CTA head: no aneurysm, AVM, LVO or sig stenosis in COW  CTA neck: no sig carotid or vertebral stenosis  CT Perfusion head - CBF<30% volume 0ml, Tmax>6s volume =0ml Preliminary note, official note pending attending review/signature.                                 [facility] Stroke Team    CC: AMS, Code Stroke  HPI:  84 y/o Male with PMHx HTN, HLD, chronic small pontine strokes, TIA, presented to [facility] ED on 3/17/25 complaining of altered mental status. Collateral information obtained from wife at bedside who witnessed episode. On day of presentation, at 14:30 patient said, "I'm dizzy and have a headache, I have to lie down." Patient woke up at 17:00 and "seemed off" per wife, he went to the bathroom to get a meclizine, which he is prescribed for vertigo, and "the water just spilled out of the side of his mouth." Per patient's wife, his speech was also slurred and "his legs were like Jello when he stood up." On arrival to ED, patient was a Code Stroke, NIHSS 3 (score of 2 for ataxia in 2 limbs, score of 1 for aphasia). CT brain and perfusion in ED negative for acute stroke. Patient was found to be significantly hypertensive with SBP in 200s, Cardene gtt initiated. Pt transferred to CTICU for further monitoring.     At the time of exam, patient has been titrated off of Cardene gtt and is hemodynamically stable. Per patient and his wife, he is adherent to his medications. He was previously seen by Neurology at [facility] in October 2023, MRI at the time showed no acute infarcts but revealed chronic small pontine strokes. Patient was dx with TIA and discharged home on 21-day course of DAPT; per patient and wife, he was transitioned off of DAPT and then placed on ASA monotherapy. Patient reports he was adherent to medication and had no issues on medication but he was switched to Plavix monotherapy by his provider, he is unsure why.     PAST MEDICAL & SURGICAL HISTORY:  Hypertension  Guillain Barré syndrome  diagnosed oct 2015 treated at [facility]; under care of neurologist.  Diabetes  High cholesterol  Hypertension  CVA (cerebrovascular accident)  H/O bilateral inguinal hernia repair  History of cholecystectomy  Lumbosacral spinal stenosis  h.o cyst resection, decompression 12/2015      MEDICATIONS:  MEDICATIONS  (STANDING):  carvedilol 3.125 milliGRAM(s) Oral every 12 hours  clopidogrel Tablet 75 milliGRAM(s) Oral daily  dextrose 5%. 1000 milliLiter(s) (100 mL/Hr) IV Continuous <Continuous>  dextrose 5%. 1000 milliLiter(s) (50 mL/Hr) IV Continuous <Continuous>  dextrose 50% Injectable 25 Gram(s) IV Push once  dextrose 50% Injectable 12.5 Gram(s) IV Push once  dextrose 50% Injectable 25 Gram(s) IV Push once  enalapril 10 milliGRAM(s) Oral daily  ezetimibe 10 milliGRAM(s) Oral daily  glucagon  Injectable 1 milliGRAM(s) IntraMuscular once  heparin   Injectable 5000 Unit(s) SubCutaneous every 8 hours  insulin lispro (ADMELOG) corrective regimen sliding scale   SubCutaneous three times a day before meals  magnesium sulfate  IVPB 2 Gram(s) IV Intermittent once  sodium chloride 0.9% lock flush 3 milliLiter(s) IV Push every 8 hours  tamsulosin 0.8 milliGRAM(s) Oral at bedtime    MEDICATIONS  (PRN):  dextrose Oral Gel 15 Gram(s) Oral once PRN Blood Glucose LESS THAN 70 milliGRAM(s)/deciliter    Allergies  No Known Allergies    Intolerances  N/A    SOCIAL HISTORY:  Denies tobacco, alcohol, drug use    FAMILY HISTORY:  Family history of cancer      ROS: 14 point ROS negative other than what is present in HPI or below    Vital Signs Last 24 Hrs  T(C): 37.2 (18 Mar 2025 08:00), Max: 37.3 (18 Mar 2025 04:00)  T(F): 98.9 (18 Mar 2025 08:00), Max: 99.1 (18 Mar 2025 04:00)  HR: 70 (18 Mar 2025 10:00) (57 - 86)  BP: 112/73 (18 Mar 2025 09:00) (94/55 - 139/67)  BP(mean): 85 (18 Mar 2025 09:00) (60 - 101)  RR: 15 (18 Mar 2025 10:00) (9 - 26)  SpO2: 96% (18 Mar 2025 10:00) (96% - 100%)    Parameters below as of 18 Mar 2025 08:00  Patient On (Oxygen Delivery Method): room air      PHYSICAL EXAM:  General: NAD    Detailed Neurologic Exam:    Mental status: The patient is awake and alert and has normal attention span.  The patient is fully oriented in 3 spheres. The patient is oriented to current events. The patient is able to name objects, follow commands, repeat sentences.    Cranial nerves: Pupils equal and react symmetrically to light. There is no visual field deficit to confrontation. Extraocular motion is full with no nystagmus. There is no ptosis. Facial sensation is intact. Mild Right facial palsy. Palate elevates symmetrically. Tongue is midline.    Motor: There is normal bulk and tone.  There is no tremor.  Strength is 5/5 in the right arm and leg. +RUE pronator drift   Strength is 5/5 in the left arm and leg.    Sensation: Intact to light touch and pin in 4 extremities    Reflexes: 1-2+ throughout and plantar responses are flexor.    Cerebellar: There is no dysmetria on finger to nose testing.    Gait: Deferred      [facility] SS:  DATE: 3/18/25  TIME: 10:30  1A: Level of consciousness (0-3): 0  1B: Questions (0-2): 0  1C: Commands (0-2): 0  2: Gaze (0-2): 0  3: Visual fields (0-3): 0  4: Facial palsy (0-3): 1  MOTOR:  5A: Left arm motor drift (0-4): 0  5B: Right arm motor drift (0-4): 0  6A: Left leg motor drift (0-4): 0  6B: Right leg motor drift (0-4): 0  7: Limb ataxia (0-2): 0  SENSORY:  8: Sensation (0-2): 0  SPEECH:  9: Language (0-3): 0  10: Dysarthria (0-2): 0  EXTINCTION:  11: Extinction/inattention (0-2): 0    TOTAL SCORE: 1  prehospital mRS= 0      LABS:                         12.9   6.85  )-----------( 150      ( 18 Mar 2025 03:16 )             37.5       03-18    141  |  103  |  21.3[H]  ----------------------------<  119[H]  4.2   |  25.0  |  1.17    Ca    8.9      18 Mar 2025 03:16  Mg     1.7     03-18    TPro  6.7  /  Alb  4.2  /  TBili  0.5  /  DBili  x   /  AST  17  /  ALT  10  /  AlkPhos  62  03-16      PT/INR - ( 16 Mar 2025 17:50 )   PT: 11.1 sec;   INR: 0.98 ratio         PTT - ( 16 Mar 2025 17:50 )  PTT:27.3 sec    LDL PENDING  A1C 6.1      RADIOLOGY & ADDITIONAL STUDIES:  TTE W or WO Ultrasound Enhancing Agent (03.17.25 @ 00:55)  CONCLUSIONS:   1. Left ventricular systolic function is hyperdynamic with an ejection fraction visually estimated at >75 %.   2. Mild left ventricular hypertrophy.    CT Angio Neck Stroke Protocol w/ IV Cont (03.16.25 @ 18:18)  CBF<30%/CORE INFARCTION: 0 mL  TMAX>6s/UNDERPERFUSED TISSUE: 0 mL  MISMATCH VOLUME/TISSUE AT RISK: 0 mL  MISMATCH RATIO: None.    IMPRESSION:  CT HEAD:  Gray/white matter differentiation is preserved. No acute intracranial   hemorrhage.    Findings were discussed with Dr. DORCAS BOSS [phone] 3/16/2025 6:17   PM by Dr. Behr Ventura with read back confirmation.    CT PERFUSION:  Technical limitations: Limited by mild to moderate patient motion in all   3 planes during image acquisition and nonoptimized arterial waveform   imaging.    Core infarction: 0 ml  Penumbra / tissue at risk for active ischemia: No active ischemia   determined using the conventional parameter of Tmax greater than 6s.   However, when using the more sensitive but less accurate Tmax >4s, there   is 18 mL tissue at risk for active ischemia involving the left posterior   temporal, posterior parietal, occipital brain parenchyma (along the   approximate course of the left posterior cerebral artery).    CTA NECK:  No evidence of significant stenosis or occlusion.    CTA HEAD:  1. No large vessel occlusion.  2. Deposition of calcified plaque in association with the cavernous and   supraclinoid portions of the bilateral internal carotid arteries with   mild to moderate stenosis bilaterally in these locations.  3. Hypoplastic but patent left A1 segment. Hypoplastic and patent left P1   segment with a left posterior communicating artery which also contributes   to flow within the left posterior cerebral artery.  4. No aneurysm identified. Tiny aneurysms can be beyond the resolution of   CTA technique.                                  [facility] Stroke Team    CC: AMS, Code Stroke  HPI:  84 y/o Male with PMHx HTN, HLD, chronic small pontine strokes, TIA, presented to [facility] ED on 3/17/25 complaining of altered mental status. Collateral information obtained from wife at bedside who witnessed episode. On day of presentation, at 14:30 patient said, "I'm dizzy and have a headache, I have to lie down." Patient woke up at 17:00 and "seemed off" per wife, he went to the bathroom to get a meclizine, which he is prescribed for vertigo, and "the water just spilled out of the side of his mouth." Per patient's wife, his speech was also slurred and "his legs were like Jello when he stood up." On arrival to ED, patient was a Code Stroke, NIHSS 3 (score of 2 for ataxia in 2 limbs, score of 1 for aphasia). CT brain and perfusion in ED negative for acute stroke. Patient was found to be significantly hypertensive with SBP in 200s, Cardene gtt initiated. Pt transferred to CTICU for further monitoring.     At the time of exam, patient has been titrated off of Cardene gtt and is hemodynamically stable. Per patient and his wife, he is adherent to his medications. He was previously seen by Neurology at [facility] in October 2023, MRI at the time showed no acute infarcts but revealed chronic small pontine strokes. Patient was dx with TIA and discharged home on 21-day course of DAPT; per patient and wife, he was transitioned off of DAPT and then placed on ASA monotherapy. Patient reports he was adherent to medication and had no issues on medication but he was switched to Plavix monotherapy by his provider, he is unsure why.     PAST MEDICAL & SURGICAL HISTORY:  Hypertension  Guillain Barré syndrome  diagnosed oct 2015 treated at [facility]; under care of neurologist.  Diabetes  High cholesterol  Hypertension  CVA (cerebrovascular accident)  H/O bilateral inguinal hernia repair  History of cholecystectomy  Lumbosacral spinal stenosis  h.o cyst resection, decompression 12/2015      MEDICATIONS:  MEDICATIONS  (STANDING):  carvedilol 3.125 milliGRAM(s) Oral every 12 hours  clopidogrel Tablet 75 milliGRAM(s) Oral daily  dextrose 5%. 1000 milliLiter(s) (100 mL/Hr) IV Continuous <Continuous>  dextrose 5%. 1000 milliLiter(s) (50 mL/Hr) IV Continuous <Continuous>  dextrose 50% Injectable 25 Gram(s) IV Push once  dextrose 50% Injectable 12.5 Gram(s) IV Push once  dextrose 50% Injectable 25 Gram(s) IV Push once  enalapril 10 milliGRAM(s) Oral daily  ezetimibe 10 milliGRAM(s) Oral daily  glucagon  Injectable 1 milliGRAM(s) IntraMuscular once  heparin   Injectable 5000 Unit(s) SubCutaneous every 8 hours  insulin lispro (ADMELOG) corrective regimen sliding scale   SubCutaneous three times a day before meals  magnesium sulfate  IVPB 2 Gram(s) IV Intermittent once  sodium chloride 0.9% lock flush 3 milliLiter(s) IV Push every 8 hours  tamsulosin 0.8 milliGRAM(s) Oral at bedtime    MEDICATIONS  (PRN):  dextrose Oral Gel 15 Gram(s) Oral once PRN Blood Glucose LESS THAN 70 milliGRAM(s)/deciliter    Allergies  No Known Allergies    Intolerances  N/A    SOCIAL HISTORY:  Denies tobacco, alcohol, drug use    FAMILY HISTORY:  Family history of cancer      ROS: 14 point ROS negative other than what is present in HPI or below    Vital Signs Last 24 Hrs  T(C): 37.2 (18 Mar 2025 08:00), Max: 37.3 (18 Mar 2025 04:00)  T(F): 98.9 (18 Mar 2025 08:00), Max: 99.1 (18 Mar 2025 04:00)  HR: 70 (18 Mar 2025 10:00) (57 - 86)  BP: 112/73 (18 Mar 2025 09:00) (94/55 - 139/67)  BP(mean): 85 (18 Mar 2025 09:00) (60 - 101)  RR: 15 (18 Mar 2025 10:00) (9 - 26)  SpO2: 96% (18 Mar 2025 10:00) (96% - 100%)    Parameters below as of 18 Mar 2025 08:00  Patient On (Oxygen Delivery Method): room air      PHYSICAL EXAM:  General: NAD    Detailed Neurologic Exam:    Mental status: The patient is awake and alert and has normal attention span.  The patient is fully oriented in 3 spheres. The patient is oriented to current events. The patient is able to name objects, follow commands, repeat sentences.    Cranial nerves: Pupils equal and react symmetrically to light. There is no visual field deficit to confrontation. Extraocular motion is full with no nystagmus. There is no ptosis. Facial sensation is intact. Mild Right facial palsy. Palate elevates symmetrically. Tongue is midline.    Motor: There is normal bulk and tone.  There is no tremor.  Strength is 5/5 in the right arm and leg. +RUE pronator drift   Strength is 5/5 in the left arm and leg.    Sensation: Intact to light touch and pin in 4 extremities    Reflexes: 1-2+ throughout and plantar responses are flexor.    Cerebellar: There is no dysmetria on finger to nose testing.    Gait: Deferred      [facility] SS:  DATE: 3/18/25  TIME: 10:30  1A: Level of consciousness (0-3): 0  1B: Questions (0-2): 0  1C: Commands (0-2): 0  2: Gaze (0-2): 0  3: Visual fields (0-3): 0  4: Facial palsy (0-3): 1  MOTOR:  5A: Left arm motor drift (0-4): 0  5B: Right arm motor drift (0-4): 0  6A: Left leg motor drift (0-4): 0  6B: Right leg motor drift (0-4): 0  7: Limb ataxia (0-2): 0  SENSORY:  8: Sensation (0-2): 0  SPEECH:  9: Language (0-3): 0  10: Dysarthria (0-2): 0  EXTINCTION:  11: Extinction/inattention (0-2): 0    TOTAL SCORE: 1  prehospital mRS= 0      LABS:                         12.9   6.85  )-----------( 150      ( 18 Mar 2025 03:16 )             37.5       03-18    141  |  103  |  21.3[H]  ----------------------------<  119[H]  4.2   |  25.0  |  1.17    Ca    8.9      18 Mar 2025 03:16  Mg     1.7     03-18    TPro  6.7  /  Alb  4.2  /  TBili  0.5  /  DBili  x   /  AST  17  /  ALT  10  /  AlkPhos  62  03-16      PT/INR - ( 16 Mar 2025 17:50 )   PT: 11.1 sec;   INR: 0.98 ratio         PTT - ( 16 Mar 2025 17:50 )  PTT:27.3 sec    LDL PENDING  A1C 6.1      RADIOLOGY & ADDITIONAL STUDIES:  TTE W or WO Ultrasound Enhancing Agent (03.17.25 @ 00:55)  CONCLUSIONS:   1. Left ventricular systolic function is hyperdynamic with an ejection fraction visually estimated at >75 %.   2. Mild left ventricular hypertrophy.    CT Angio Neck Stroke Protocol w/ IV Cont (03.16.25 @ 18:18)  CBF<30%/CORE INFARCTION: 0 mL  TMAX>6s/UNDERPERFUSED TISSUE: 0 mL  MISMATCH VOLUME/TISSUE AT RISK: 0 mL  MISMATCH RATIO: None.    IMPRESSION:  CT HEAD:  Gray/white matter differentiation is preserved. No acute intracranial   hemorrhage.    Findings were discussed with Dr. DORCAS BOSS [phone] 3/16/2025 6:17   PM by Dr. Behr Ventura with read back confirmation.    CT PERFUSION:  Technical limitations: Limited by mild to moderate patient motion in all   3 planes during image acquisition and nonoptimized arterial waveform   imaging.    Core infarction: 0 ml  Penumbra / tissue at risk for active ischemia: No active ischemia   determined using the conventional parameter of Tmax greater than 6s.   However, when using the more sensitive but less accurate Tmax >4s, there   is 18 mL tissue at risk for active ischemia involving the left posterior   temporal, posterior parietal, occipital brain parenchyma (along the   approximate course of the left posterior cerebral artery).    CTA NECK:  No evidence of significant stenosis or occlusion.    CTA HEAD:  1. No large vessel occlusion.  2. Deposition of calcified plaque in association with the cavernous and   supraclinoid portions of the bilateral internal carotid arteries with   mild to moderate stenosis bilaterally in these locations.  3. Hypoplastic but patent left A1 segment. Hypoplastic and patent left P1   segment with a left posterior communicating artery which also contributes   to flow within the left posterior cerebral artery.  4. No aneurysm identified. Tiny aneurysms can be beyond the resolution of   CTA technique.

## 2025-03-18 NOTE — DIETITIAN INITIAL EVALUATION ADULT - ORAL INTAKE PTA/DIET HISTORY
Spoke to pt and wife at bedside. Pt reports eating well PTA; follows a Diabetic diet. Pt reports being on oxempic x 2 years and has intentional weight loss of 35lbs with medication. Pt denies difficulty chewing or swallowing at this time. Verbally reviewed therapeutic diet guidelines, pt declined further education at this time.

## 2025-03-18 NOTE — PROGRESS NOTE ADULT - NS ATTEND AMEND GEN_ALL_CORE FT
Patient seen and examined at bedside with no chest pain or shortness of breath; blood pressure controlled and on home antihypertensive regimen   Patient notes that he was evaluated by Neurology and pending a brain MRI   TTE done shows Left ventricular systolic function is hyperdynamic with an ejection fraction visually estimated at >75 %. Mild left ventricular hypertrophy.    Continue with Carvedilol 3.125mg po q 12hrs and Enalapril 10mg po q daily, if blood pressure increases can increase the Coreg to 6.25 mg po q 12hrs   Paroxysmal Afib - in sinus rhythm ILR in place and no evidence of Afib recently   No further cardiovascular work up   Can follow up with private cardiologist at [facility]     will sign off     Joi Hogan D.O. [facility]  Cardiology/Vascular Cardiology -[facility] Cardiology   Telephone # [phone]

## 2025-03-18 NOTE — DISCHARGE NOTE NURSING/CASE MANAGEMENT/SOCIAL WORK - FINANCIAL ASSISTANCE
[facility] provides services at a reduced cost to those who are determined to be eligible through [facility]’s financial assistance program. Information regarding [facility]’s financial assistance program can be found by going to [email or web address] or by calling [phone].